# Patient Record
Sex: FEMALE | Race: WHITE | Employment: STUDENT | URBAN - METROPOLITAN AREA
[De-identification: names, ages, dates, MRNs, and addresses within clinical notes are randomized per-mention and may not be internally consistent; named-entity substitution may affect disease eponyms.]

---

## 2020-11-18 ENCOUNTER — TELEPHONE (OUTPATIENT)
Dept: PRIMARY CARE CLINIC | Age: 20
End: 2020-11-18

## 2020-11-18 NOTE — TELEPHONE ENCOUNTER
Patient called and stated she would like a Covid-19 referral sent to [address] [place] [address] her she can go to the flu clinic but she said someone specific wanted her to go to [facility].

## 2020-11-19 NOTE — TELEPHONE ENCOUNTER
Patient called back and stated she really would like to get a test done as she has multiple symptoms and she is still working as she does not have a test yet.     Fax to [phone]

## 2022-03-23 ENCOUNTER — TELEPHONE (OUTPATIENT)
Dept: PRIMARY CARE CLINIC | Age: 22
End: 2022-03-23

## 2022-03-23 NOTE — TELEPHONE ENCOUNTER
Pt. Was accidentally transferred to [facility] and writer contacted pt to communicate w/ pt. Pt. Stated she apologized she was trying to get in touch w/ her previous PCP Dr. Paul Gordon.  Writer located number for pt to reach out to follow up w/ lab order request.

## 2022-03-23 NOTE — TELEPHONE ENCOUNTER
----- Message from Simone Schmid sent at 3/23/2022 12:47 PM EDT -----  Subject: Message to Provider    QUESTIONS  Information for Provider? Was once a patient of the practice and has bot   be seen i awhile but needs titers doesn't for school to see what is has   immunity to and doesn't   ---------------------------------------------------------------------------  --------------  CALL BACK INFO  What is the best way for the office to contact you? OK to leave message on   voicemail  Preferred Call Back Phone Number? [phone]  ---------------------------------------------------------------------------  --------------  SCRIPT ANSWERS  Relationship to Patient?  Self

## 2022-03-23 NOTE — TELEPHONE ENCOUNTER
----- Message from Ross Lopez sent at 3/23/2022  1:31 PM EDT -----  Subject: Message to Provider    QUESTIONS  Information for Provider? 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   ---------------------------------------------------------------------------  --------------  [address]  What is the best way for the office to contact you? OK to leave message on   voicemail  Preferred Call Back Phone Number? [phone]  ---------------------------------------------------------------------------  --------------  SCRIPT ANSWERS  Relationship to Patient?  Self

## 2022-04-07 ENCOUNTER — OFFICE VISIT (OUTPATIENT)
Dept: FAMILY MEDICINE CLINIC | Age: 22
End: 2022-04-07
Payer: COMMERCIAL

## 2022-04-07 ENCOUNTER — HOSPITAL ENCOUNTER (OUTPATIENT)
Age: 22
Setting detail: SPECIMEN
Discharge: HOME | End: 2022-04-07

## 2022-04-07 VITALS
WEIGHT: 171.4 LBS | HEIGHT: 64 IN | DIASTOLIC BLOOD PRESSURE: 76 MMHG | SYSTOLIC BLOOD PRESSURE: 118 MMHG | BODY MASS INDEX: 29.26 KG/M2

## 2022-04-07 DIAGNOSIS — Z76.89 ENCOUNTER TO ESTABLISH CARE: ICD-10-CM

## 2022-04-07 DIAGNOSIS — F39 MOOD DISORDER (HCC): ICD-10-CM

## 2022-04-07 DIAGNOSIS — Z02.0 SCHOOL PHYSICAL EXAM: ICD-10-CM

## 2022-04-07 DIAGNOSIS — L40.0 PLAQUE PSORIASIS: ICD-10-CM

## 2022-04-07 DIAGNOSIS — N92.1 MENORRHAGIA WITH IRREGULAR CYCLE: ICD-10-CM

## 2022-04-07 DIAGNOSIS — F41.9 ANXIETY: ICD-10-CM

## 2022-04-07 DIAGNOSIS — Z02.0 SCHOOL PHYSICAL EXAM: Primary | ICD-10-CM

## 2022-04-07 DIAGNOSIS — F32.A DEPRESSION, UNSPECIFIED DEPRESSION TYPE: ICD-10-CM

## 2022-04-07 LAB — RUBV IGG SER QL: 127 IU/ML

## 2022-04-07 PROCEDURE — G0444 DEPRESSION SCREEN ANNUAL: HCPCS

## 2022-04-07 PROCEDURE — 99385 PREV VISIT NEW AGE 18-39: CPT

## 2022-04-07 PROCEDURE — 99214 OFFICE O/P EST MOD 30 MIN: CPT

## 2022-04-07 RX ORDER — GUSELKUMAB 100 MG/ML
INJECTION SUBCUTANEOUS
COMMUNITY
Start: 2022-01-24

## 2022-04-07 SDOH — HEALTH STABILITY: PHYSICAL HEALTH: ON AVERAGE, HOW MANY DAYS PER WEEK DO YOU ENGAGE IN MODERATE TO STRENUOUS EXERCISE (LIKE A BRISK WALK)?: 1 DAY

## 2022-04-07 SDOH — HEALTH STABILITY: PHYSICAL HEALTH: ON AVERAGE, HOW MANY MINUTES DO YOU ENGAGE IN EXERCISE AT THIS LEVEL?: 60 MIN

## 2022-04-07 ASSESSMENT — PATIENT HEALTH QUESTIONNAIRE - PHQ9
SUM OF ALL RESPONSES TO PHQ QUESTIONS 1-9: 18
7. TROUBLE CONCENTRATING ON THINGS, SUCH AS READING THE NEWSPAPER OR WATCHING TELEVISION: 1
9. THOUGHTS THAT YOU WOULD BE BETTER OFF DEAD, OR OF HURTING YOURSELF: 2
2. FEELING DOWN, DEPRESSED OR HOPELESS: 3
8. MOVING OR SPEAKING SO SLOWLY THAT OTHER PEOPLE COULD HAVE NOTICED. OR THE OPPOSITE, BEING SO FIGETY OR RESTLESS THAT YOU HAVE BEEN MOVING AROUND A LOT MORE THAN USUAL: 2
4. FEELING TIRED OR HAVING LITTLE ENERGY: 3
1. LITTLE INTEREST OR PLEASURE IN DOING THINGS: 2
10. IF YOU CHECKED OFF ANY PROBLEMS, HOW DIFFICULT HAVE THESE PROBLEMS MADE IT FOR YOU TO DO YOUR WORK, TAKE CARE OF THINGS AT HOME, OR GET ALONG WITH OTHER PEOPLE: 2
3. TROUBLE FALLING OR STAYING ASLEEP: 2
5. POOR APPETITE OR OVEREATING: 3
SUM OF ALL RESPONSES TO PHQ QUESTIONS 1-9: 20
SUM OF ALL RESPONSES TO PHQ9 QUESTIONS 1 & 2: 5
SUM OF ALL RESPONSES TO PHQ QUESTIONS 1-9: 20
SUM OF ALL RESPONSES TO PHQ QUESTIONS 1-9: 20
6. FEELING BAD ABOUT YOURSELF - OR THAT YOU ARE A FAILURE OR HAVE LET YOURSELF OR YOUR FAMILY DOWN: 2

## 2022-04-07 ASSESSMENT — ANXIETY QUESTIONNAIRES
2. NOT BEING ABLE TO STOP OR CONTROL WORRYING: 3
6. BECOMING EASILY ANNOYED OR IRRITABLE: 3
IF YOU CHECKED OFF ANY PROBLEMS ON THIS QUESTIONNAIRE, HOW DIFFICULT HAVE THESE PROBLEMS MADE IT FOR YOU TO DO YOUR WORK, TAKE CARE OF THINGS AT HOME, OR GET ALONG WITH OTHER PEOPLE: VERY DIFFICULT
5. BEING SO RESTLESS THAT IT IS HARD TO SIT STILL: 2
3. WORRYING TOO MUCH ABOUT DIFFERENT THINGS: 3
4. TROUBLE RELAXING: 2
1. FEELING NERVOUS, ANXIOUS, OR ON EDGE: 3
GAD7 TOTAL SCORE: 17
7. FEELING AFRAID AS IF SOMETHING AWFUL MIGHT HAPPEN: 1

## 2022-04-07 ASSESSMENT — COLUMBIA-SUICIDE SEVERITY RATING SCALE - C-SSRS
1. WITHIN THE PAST MONTH, HAVE YOU WISHED YOU WERE DEAD OR WISHED YOU COULD GO TO SLEEP AND NOT WAKE UP?: NO
2. HAVE YOU ACTUALLY HAD ANY THOUGHTS OF KILLING YOURSELF?: NO
6. HAVE YOU EVER DONE ANYTHING, STARTED TO DO ANYTHING, OR PREPARED TO DO ANYTHING TO END YOUR LIFE?: NO

## 2022-04-07 NOTE — PROGRESS NOTES
History and Physical      Flavio Baldwin  YOB: 2000    Date of Service:  4/7/2022    Chief Complaint:   Flavio Baldwin is a 25 y.o. Female who presents for complete physical examination. HPI: Patient here to establish care and complete physical and blood work for school. [facility] annually for her plaque psoriasis. She does Tremfya injections every. She did have routine labs drawn today from [facility]. Abnormal bleeding: Has previously been worked up for [employer] concern and had a trans vag US approx 2 years ago and [employer]. Had a Pap at that same time, which was normal.  She does have a long family history of endometreosis and her sister had a ovary removed due to a suspicious cyst.  Patient states she never had results of her US and she continues to bleed approx 75% of the month. Mental health concern: Upon further evaluation of screening tools today, patient states she has had significant mental health difficulties for several years. She has found this very difficult, as her family and parents \"do not believe in mental health issues\". Patient has found this extremely difficult throughout her life. Patient does also state that her parents have sporadically called her \"bipolar\". Richar Oakley feels that she will make poor life decisions based off of her unmanaged, undiagnosedv mental health concerns. She states one of the hardest problems, is going to see a therapist or psychiatrist due to insurance issues. Patient feels that she self medicates with alcohol, marijuana, and other unprescribed medications. When asking what medication she will take from others, she states \"I do not know what they are, because I do not care about the outcome. \"  Currently patient denies any thoughts of suicide or thoughts to harm herself. She does recognize that she needs help and would like to start medication.   I did discuss with her that I am happy to start her on medication, get her enrolled in therapy, and may also play send her to a psychiatrist for further diagnosis if needed. Vitals:    04/07/22 1425   BP: 118/76   Site: Right Upper Arm   Position: Sitting   Cuff Size: Large Adult   Weight: 171 lb 6.4 oz (77.7 kg)   Height: 5' 4\" (1.626 m)      Wt Readings from Last 3 Encounters:   04/07/22 171 lb 6.4 oz (77.7 kg)     BP Readings from Last 3 Encounters:   04/07/22 118/76       There is no problem list on file for this patient. Preventive Care:  Health Maintenance   Topic Date Due    Hepatitis C screen  Never done    Varicella vaccine (1 of 2 - 2-dose childhood series) Never done    COVID-19 Vaccine (1) Never done    HPV vaccine (1 - 2-dose series) Never done    HIV screen  Never done    Chlamydia screen  Never done    DTaP/Tdap/Td vaccine (1 - Tdap) Never done    Pap smear  Never done    Flu vaccine (Season Ended) 09/01/2022    Depression Monitoring  04/07/2023    Meningococcal (ACWY) vaccine  Completed    Hepatitis A vaccine  Aged Out    Hepatitis B vaccine  Aged Out    Hib vaccine  Aged Out    Pneumococcal 0-64 years Vaccine  Aged Out      Hx abnormal PAP: No abnormal pap  Sexual activity: single partner, contraception - Nexplanon   Self-breast exams: yes  Last eye exam: Contacts, abnormal - has another appointment coming up  Exercise:  Works out once or twice a week  Dental Exam: No insurance, told about [facility]  Lipid panel: No results found for: CHOL, TRIG, HDL, LDLCALC, LDLDIRECT       Immunization History   Administered Date(s) Administered    Influenza Virus Vaccine 12/03/2009, 01/09/2010, 01/08/2013, 01/16/2014, 12/22/2014, 09/30/2016    Meningococcal MCV4O (Menveo) 10/11/2016       Allergies   Allergen Reactions    Amoxicillin     Bactrim [Sulfamethoxazole-Trimethoprim]     Otezla [Apremilast]     Penicillins      Outpatient Medications Marked as Taking for the 4/7/22 encounter (Office Visit) with Carline Pelaez APRN - CNP   Medication Sig Dispense Refill    Phenylephrine-guaiFENesin (NEXPHEN PD PO) Take by mouth      TREMFYA 100 MG/ML SOSY injection       sertraline (ZOLOFT) 50 MG tablet Take 1 tablet by mouth daily 30 tablet 1       Past Medical History:   Diagnosis Date    Psoriasis      History reviewed. No pertinent surgical history. History reviewed. No pertinent family history. Review of Systems   Constitutional: Negative for activity change, fatigue, fever and unexpected weight change. HENT: Negative for dental problem and sore throat. Calcium growths on gum line- used to see dentist- no coverage at this time     Eyes: Negative for discharge and visual disturbance. Respiratory: Negative for cough and shortness of breath. Cardiovascular: Negative for chest pain, palpitations and leg swelling. Gastrointestinal: Negative for constipation, diarrhea, nausea and vomiting. Genitourinary: Negative for difficulty urinating and dysuria. Musculoskeletal: Negative for arthralgias and myalgias. Skin: Negative for rash and wound. Allergic/Immunologic: Negative for environmental allergies. Neurological: Negative for headaches. Hematological: Does not bruise/bleed easily. Psychiatric/Behavioral: Positive for agitation, behavioral problems and dysphoric mood. Negative for sleep disturbance. The patient is nervous/anxious. Physical Exam  Vitals reviewed. Constitutional:       General: She is not in acute distress. Appearance: Normal appearance. She is not ill-appearing or toxic-appearing. HENT:      Right Ear: Tympanic membrane, ear canal and external ear normal.      Left Ear: Tympanic membrane, ear canal and external ear normal.      Nose: Nose normal.      Mouth/Throat:      Mouth: Mucous membranes are moist.      Pharynx: Oropharynx is clear. Eyes:      Conjunctiva/sclera: Conjunctivae normal.   Cardiovascular:      Rate and Rhythm: Normal rate and regular rhythm. Pulses:           Radial pulses are 2+ on the right side and 2+ on the left side. Dorsalis pedis pulses are 2+ on the right side and 2+ on the left side. Heart sounds: Normal heart sounds. Pulmonary:      Effort: Pulmonary effort is normal.      Breath sounds: Normal breath sounds. Abdominal:      General: Abdomen is flat. Bowel sounds are normal.      Palpations: Abdomen is soft. Tenderness: There is no abdominal tenderness. Musculoskeletal:         General: No swelling or tenderness. Lymphadenopathy:      Cervical: No cervical adenopathy. Skin:     General: Skin is warm and dry. Capillary Refill: Capillary refill takes less than 2 seconds. Neurological:      Mental Status: She is alert and oriented to person, place, and time. Motor: No weakness. Psychiatric:         Mood and Affect: Mood is anxious. Affect is tearful. Speech: Speech normal.         Behavior: Behavior normal. Behavior is cooperative. Thought Content: Thought content normal. Thought content does not include homicidal or suicidal ideation. Judgment: Judgment is impulsive and inappropriate. No data recorded   SHARONDA 7 SCORE 4/7/2022   SHARONDA-7 Total Score 17     Interpretation of SHARONDA-7 score:   5-9 = mild anxiety  0-14 = moderate anxiety  15+ = severe anxiety. Recommend referral to behavioral health for scores 10 or greater. PHQ Scores 4/7/2022   PHQ2 Score 5   PHQ9 Score 20     Interpretation of Total Score Depression Severity: 1-4 = Minimal depression, 5-9 = Mild depression, 10-14 = Moderate depression, 15-19 = Moderately severe depression, 20-27 = Severe depression      Assessment/Plan:    1. School physical exam  -     Hepatitis B Surface Antibody; Future  -     Mumps Antibody, IgG; Future  -     Rubella antibody, IgG; Future  -     Rubeola Antibody, IgG; Future  -     Varicella Zoster Antibody, IgG; Future  2. Encounter to establish care  3.  Anxiety  -     sertraline (ZOLOFT) 50 MG tablet; Take 1 tablet by mouth daily, Disp-30 tablet, R-1Normal  -     Ambulatory referral to [address]  4. Depression, unspecified depression type  -     sertraline (ZOLOFT) 50 MG tablet; Take 1 tablet by mouth daily, Disp-30 tablet, R-1Normal  -     Ambulatory referral to [address]  5. Menorrhagia with irregular cycle  -     [facility] and Gynecology  6. Mood disorder (HCC)  -     sertraline (ZOLOFT) 50 MG tablet; Take 1 tablet by mouth daily, Disp-30 tablet, R-1Normal  7. Plaque psoriasis    Patient to start therapy. Start Zoloft  Increase physical activity  Complete titers and given immunization record. Patient advised not to use any non prescribed medications not written for her. Aware of risks. Patient denies current thoughts to harmself, and has used rescue phone lines in the past.   Patient to return in 4 weeks or sooner as needed. Continue routine care and management with dermatology.

## 2022-04-08 ENCOUNTER — TELEPHONE (OUTPATIENT)
Dept: FAMILY MEDICINE CLINIC | Age: 22
End: 2022-04-08

## 2022-04-08 DIAGNOSIS — F32.A DEPRESSION, UNSPECIFIED DEPRESSION TYPE: Primary | ICD-10-CM

## 2022-04-08 DIAGNOSIS — F41.9 ANXIETY: ICD-10-CM

## 2022-04-08 LAB
HBV SURFACE AB TITR SER: 10.08 MIU/ML
MEASLES ANTIBODY IGG: 2.26
MUV IGG SER QL: 1.77
VZV IGG SER QL IA: 1.8

## 2022-04-08 RX ORDER — VENLAFAXINE HYDROCHLORIDE 37.5 MG/1
37.5 CAPSULE, EXTENDED RELEASE ORAL DAILY
Qty: 64 CAPSULE | Refills: 3 | Status: SHIPPED
Start: 2022-04-08 | End: 2022-05-05

## 2022-04-08 NOTE — TELEPHONE ENCOUNTER
Uncontrollable vomiting and diarrhea. Please advise.    ----- Message from Tee Gerardo sent at 4/8/2022  2:52 PM EDT -----  Subject: Message to Provider    QUESTIONS  Information for Provider? Side effects to zofolt   ---------------------------------------------------------------------------  --------------  CALL BACK INFO  What is the best way for the office to contact you? OK to leave message on   voicemail  Preferred Call Back Phone Number? [phone]  ---------------------------------------------------------------------------  --------------  SCRIPT ANSWERS  Relationship to Patient?  Self

## 2022-04-08 NOTE — TELEPHONE ENCOUNTER
Please advise patient to stop Zoloft. We will try Effexor instead. I will start on the very lowest dose of this. Like her to start taking 1 By mouth daily for 4 days, then increase to 2 capsules daily thereafter. Please advise that she does take this medication with food.

## 2022-04-17 PROBLEM — N92.1 MENORRHAGIA WITH IRREGULAR CYCLE: Status: ACTIVE | Noted: 2022-04-17

## 2022-04-17 PROBLEM — L40.0 PLAQUE PSORIASIS: Status: ACTIVE | Noted: 2022-04-17

## 2022-04-17 PROBLEM — F32.A DEPRESSION: Status: ACTIVE | Noted: 2022-04-17

## 2022-04-17 PROBLEM — F41.9 ANXIETY: Status: ACTIVE | Noted: 2022-04-17

## 2022-04-17 ASSESSMENT — ENCOUNTER SYMPTOMS
EYE DISCHARGE: 0
SHORTNESS OF BREATH: 0
DIARRHEA: 0
SORE THROAT: 0
CONSTIPATION: 0
NAUSEA: 0
VOMITING: 0
COUGH: 0

## 2022-05-05 ENCOUNTER — OFFICE VISIT (OUTPATIENT)
Dept: FAMILY MEDICINE CLINIC | Age: 22
End: 2022-05-05
Payer: COMMERCIAL

## 2022-05-05 VITALS
SYSTOLIC BLOOD PRESSURE: 118 MMHG | DIASTOLIC BLOOD PRESSURE: 80 MMHG | BODY MASS INDEX: 29.02 KG/M2 | WEIGHT: 170 LBS | HEIGHT: 64 IN

## 2022-05-05 DIAGNOSIS — F32.A DEPRESSION, UNSPECIFIED DEPRESSION TYPE: ICD-10-CM

## 2022-05-05 DIAGNOSIS — F41.9 ANXIETY: ICD-10-CM

## 2022-05-05 DIAGNOSIS — F39 MOOD DISORDER (HCC): ICD-10-CM

## 2022-05-05 DIAGNOSIS — F33.9 CHRONIC MAJOR DEPRESSIVE DISORDER, RECURRENT EPISODE (HCC): Primary | ICD-10-CM

## 2022-05-05 PROCEDURE — 1036F TOBACCO NON-USER: CPT

## 2022-05-05 PROCEDURE — 99215 OFFICE O/P EST HI 40 MIN: CPT

## 2022-05-05 PROCEDURE — G8427 DOCREV CUR MEDS BY ELIG CLIN: HCPCS

## 2022-05-05 PROCEDURE — G8419 CALC BMI OUT NRM PARAM NOF/U: HCPCS

## 2022-05-05 ASSESSMENT — ENCOUNTER SYMPTOMS
EYE DISCHARGE: 0
COUGH: 0
VOMITING: 0
SHORTNESS OF BREATH: 0
SORE THROAT: 0
CONSTIPATION: 0
NAUSEA: 0
DIARRHEA: 0

## 2022-05-05 NOTE — Clinical Note
You are seeing this sweet girl next week. She is so kind, but really battling very dark depression. This was first ever really acknowledged about a month ago. I really think she fits clinical guidelines for bipolar, but would love any input. She also needs clearance to work in healthcare. I dont feel she deems any risk to others, but she has stated before she has thought about hurting other people. She states she knows she puts on a \"good front\".

## 2022-05-05 NOTE — PROGRESS NOTES
Ricyh Mancuso (:  2000) is a 25 y.o. female,Established patient, here for evaluation of the following chief complaint(s):  Depression and Anxiety         ASSESSMENT/PLAN:  1. Chronic major depressive disorder, recurrent episode (HCC)  -     cariprazine hcl (VRAYLAR) 1.5 MG capsule; Take 1 capsule by mouth daily for 14 days, Disp-14 capsule, R-0Sample  2. Mood disorder (HCC)  -     cariprazine hcl (VRAYLAR) 1.5 MG capsule; Take 1 capsule by mouth daily for 14 days, Disp-14 capsule, R-0Sample  3. Anxiety  -     cariprazine hcl (VRAYLAR) 1.5 MG capsule; Take 1 capsule by mouth daily for 14 days, Disp-14 capsule, R-0Sample  4. Depression, unspecified depression type  -     cariprazine hcl (VRAYLAR) 1.5 MG capsule; Take 1 capsule by mouth daily for 14 days, Disp-14 capsule, R-0Sample    At this time I do not feel that patient is a risk to self or others. She is very proactive, and seeks medical treatment. She is also starting therapy next week for further resources. Patient verbalizes that if she did have a plan to commit suicide, she would reach out to the hotline or seek medical treatment. Patient has had 2 medication failures for anxiety and depression, and at this time I do feel she could benefit from more of a mood stabilizer, such as Vraylar. Patient is open to starting this. She is also open to seeing psychiatry if we do not have good improvement. Patient to see therapist next week and 2 week follow up with me. Return in about 4 days (around 2022) for TDap  and COVID vaccine. All start TB 2step . Subjective   SUBJECTIVE/OBJECTIVE:    Patient presents today for routine follow-up after starting Zoloft. After her first dose of Zoloft had significant vomiting, and was quickly changed to Effexor. Patient states she noticed no difference, and actually has been battling more depressive episodes over the last month.   She does continue to acknowledge that she is having difficulty with depression and suicidal thoughts. She also recently did superficially cut herself. Continues to feel like she has significant bouts of depression for weeks at a time, and then will have a couple weeks where she is very elated and happy. She continues to feel very agitated and easily angered. Patient did previously score high on the mood disorder screening tool. 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 Patient does not feel she is a risk to her patient's, as she states she truly enjoys her job. Patient does verbalize that she would seek medical treatment in the event that she felt she would harm herself or harm others. Review of Systems   Constitutional: Negative for activity change, fatigue, fever and unexpected weight change. HENT: Negative for dental problem and sore throat. Eyes: Negative for discharge and visual disturbance. Respiratory: Negative for cough and shortness of breath. Cardiovascular: Negative for chest pain, palpitations and leg swelling. Gastrointestinal: Negative for constipation, diarrhea, nausea and vomiting. Genitourinary: Negative for difficulty urinating and dysuria. Musculoskeletal: Negative for arthralgias and myalgias. Skin: Negative for rash and wound. Allergic/Immunologic: Negative for environmental allergies. Neurological: Negative for headaches. Hematological: Does not bruise/bleed easily. Psychiatric/Behavioral: Positive for agitation, behavioral problems, dysphoric mood, self-injury and suicidal ideas. Negative for sleep disturbance. The patient is nervous/anxious. Objective   Physical Exam  Constitutional:       General: She is not in acute distress. Appearance: Normal appearance. She is not ill-appearing. Cardiovascular:      Rate and Rhythm: Normal rate and regular rhythm. Heart sounds: Normal heart sounds. No murmur heard.       Pulmonary:      Effort: Pulmonary effort is normal.      Breath sounds: Normal breath sounds. Skin:     General: Skin is warm and dry. Comments: Scant superficial cut marks on left wrist, healing well. No redness or drainage. Neurological:      Mental Status: She is alert and oriented to person, place, and time. Motor: No weakness. Gait: Gait normal.   Psychiatric:         Attention and Perception: Attention normal.         Mood and Affect: Mood is depressed. Affect is tearful. Speech: Speech normal.         Behavior: Behavior normal.         Thought Content: Thought content does not include homicidal or suicidal ( thoughts) ideation. Thought content does not include homicidal or suicidal plan. Judgment: Judgment normal.      Comments: Intermittently tearful. Feels that the last 2 weeks have been really hard and she is having deep, dark, thouhts. She denies any plan to hurt self, and states her most recent self inflicted cuts were to feel the pain. She feels more anxious because of how bad her depression currently is. She notes that she has these severe lows and increased highs that seem to wax and wane. She has previously has thoughts to hurt other people, but only when she is arguing with them. She has a difficulty time with anger. On this date 5/5/2022 I have spent 40 minutes reviewing previous notes, test results and face to face with the patient discussing the diagnosis and importance of compliance with the treatment plan as well as documenting on the day of the visit. An electronic signature was used to authenticate this note.     --BRIANA Cowart - CNP

## 2022-05-09 ENCOUNTER — OFFICE VISIT (OUTPATIENT)
Dept: BEHAVIORAL/MENTAL HEALTH CLINIC | Age: 22
End: 2022-05-09
Payer: COMMERCIAL

## 2022-05-09 ENCOUNTER — NURSE ONLY (OUTPATIENT)
Dept: FAMILY MEDICINE CLINIC | Age: 22
End: 2022-05-09
Payer: COMMERCIAL

## 2022-05-09 VITALS — BODY MASS INDEX: 29.02 KG/M2 | HEIGHT: 64 IN | WEIGHT: 170 LBS

## 2022-05-09 DIAGNOSIS — F43.10 PTSD (POST-TRAUMATIC STRESS DISORDER): Primary | ICD-10-CM

## 2022-05-09 DIAGNOSIS — Z23 NEED FOR TUBERCULOSIS VACCINATION: Primary | ICD-10-CM

## 2022-05-09 PROCEDURE — 90715 TDAP VACCINE 7 YRS/> IM: CPT | Performed by: NURSE PRACTITIONER

## 2022-05-09 PROCEDURE — 90471 IMMUNIZATION ADMIN: CPT | Performed by: NURSE PRACTITIONER

## 2022-05-09 PROCEDURE — 1036F TOBACCO NON-USER: CPT | Performed by: SOCIAL WORKER

## 2022-05-09 PROCEDURE — 90791 PSYCH DIAGNOSTIC EVALUATION: CPT | Performed by: SOCIAL WORKER

## 2022-05-09 NOTE — PROGRESS NOTES
Patient tolerated TB injection well in the right forearm palm up. Also tolerated the tdap injection well in the left deltoid. No concerns or irritation at site. Patient was advised to wait 10 min after immunization incase of a reaction. Office does no have COVID vaccine right now, it is . I told patient that it was not clear if office is going to continue the vaccine. She stated she will go to a local pharmacy, [facility] today. Writer advised patient to make it known she got these vaccines today.
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
## 2022-05-11 ENCOUNTER — NURSE ONLY (OUTPATIENT)
Dept: FAMILY MEDICINE CLINIC | Age: 22
End: 2022-05-11

## 2022-05-11 VITALS — BODY MASS INDEX: 29.02 KG/M2 | WEIGHT: 170 LBS | HEIGHT: 64 IN | TEMPERATURE: 97.7 F

## 2022-05-11 DIAGNOSIS — Z11.1 ENCOUNTER FOR PPD SKIN TEST READING: Primary | ICD-10-CM

## 2022-05-11 NOTE — PROGRESS NOTES
PPD Reading Note  PPD read and results entered in [facility]. Result: 0.0 mm induration.   Interpretation: Negative  If test not read within 48-72 hours of initial placement, patient advised to repeat in other arm 1-3 weeks after this test.  Allergic reaction: no

## 2022-05-18 ENCOUNTER — NURSE ONLY (OUTPATIENT)
Dept: FAMILY MEDICINE CLINIC | Age: 22
End: 2022-05-18

## 2022-05-18 DIAGNOSIS — Z23 NEED FOR TUBERCULOSIS VACCINATION: Primary | ICD-10-CM

## 2022-05-18 NOTE — PROGRESS NOTES
Patient tolerated TB injection very well in the left arm palm up. No concerns or irritation at site. Patient scheduled for her next and final read.

## 2022-05-19 ENCOUNTER — OFFICE VISIT (OUTPATIENT)
Dept: FAMILY MEDICINE CLINIC | Age: 22
End: 2022-05-19
Payer: COMMERCIAL

## 2022-05-19 VITALS — DIASTOLIC BLOOD PRESSURE: 80 MMHG | HEIGHT: 64 IN | BODY MASS INDEX: 29.18 KG/M2 | SYSTOLIC BLOOD PRESSURE: 126 MMHG

## 2022-05-19 DIAGNOSIS — F39 MOOD DISORDER (HCC): ICD-10-CM

## 2022-05-19 DIAGNOSIS — F41.9 ANXIETY: ICD-10-CM

## 2022-05-19 DIAGNOSIS — F33.9 CHRONIC MAJOR DEPRESSIVE DISORDER, RECURRENT EPISODE (HCC): Primary | ICD-10-CM

## 2022-05-19 DIAGNOSIS — F32.A DEPRESSION, UNSPECIFIED DEPRESSION TYPE: ICD-10-CM

## 2022-05-19 PROCEDURE — 99214 OFFICE O/P EST MOD 30 MIN: CPT

## 2022-05-19 PROCEDURE — G8419 CALC BMI OUT NRM PARAM NOF/U: HCPCS

## 2022-05-19 PROCEDURE — 1036F TOBACCO NON-USER: CPT

## 2022-05-19 PROCEDURE — G8427 DOCREV CUR MEDS BY ELIG CLIN: HCPCS

## 2022-05-19 SDOH — ECONOMIC STABILITY: FOOD INSECURITY: WITHIN THE PAST 12 MONTHS, YOU WORRIED THAT YOUR FOOD WOULD RUN OUT BEFORE YOU GOT MONEY TO BUY MORE.: NEVER TRUE

## 2022-05-19 SDOH — ECONOMIC STABILITY: FOOD INSECURITY: WITHIN THE PAST 12 MONTHS, THE FOOD YOU BOUGHT JUST DIDN'T LAST AND YOU DIDN'T HAVE MONEY TO GET MORE.: NEVER TRUE

## 2022-05-19 ASSESSMENT — SOCIAL DETERMINANTS OF HEALTH (SDOH): HOW HARD IS IT FOR YOU TO PAY FOR THE VERY BASICS LIKE FOOD, HOUSING, MEDICAL CARE, AND HEATING?: NOT HARD AT ALL

## 2022-05-19 NOTE — PROGRESS NOTES
Stef Agosto (:  2000) is a 25 y.o. female,Established patient, here for evaluation of the following chief complaint(s): Anxiety and Depression         ASSESSMENT/PLAN:  1. Chronic major depressive disorder, recurrent episode (HCC)  -     cariprazine hcl (VRAYLAR) 1.5 MG capsule; Take 1 capsule by mouth daily, Disp-30 capsule, R-5Normal  2. Mood disorder (HCC)  -     cariprazine hcl (VRAYLAR) 1.5 MG capsule; Take 1 capsule by mouth daily, Disp-30 capsule, R-5Normal  3. Anxiety  -     cariprazine hcl (VRAYLAR) 1.5 MG capsule; Take 1 capsule by mouth daily, Disp-30 capsule, R-5Normal  4. Depression, unspecified depression type  -     cariprazine hcl (VRAYLAR) 1.5 MG capsule; Take 1 capsule by mouth daily, Disp-30 capsule, R-5Normal    And continues therapy with Carmen in office. Continue to encourage cardiovascular activity and exercise. Continue Vraylar at 1.5 mg. Coupon card given in office. Return to office in 3 months or sooner as needed. Return in about 3 months (around 2022). Vraylar Samples- 2,7 packs given   LOT [id number]  Ex:     Subjective   SUBJECTIVE/OBJECTIVE:  Patient presents today after starting Vraylar samples 1.5 mg. She has also established with therapy, in regards to her anxiety, depression, and PTSD. Patient was having significant difficulty with severe highs and lows, and also previous thoughts of suicide. Patient mentions today, that this is the best she has felt in a really long time, and almost makes her feel uncomfortable, because she is not used to having such mental clarity, rationalize her thoughts, and not be so easily angered. Patient also states she has not had excessive highs or excessive lows since starting the Vraylar samples. Patient is hopeful that she can stay on this long-term, as she notices significant differences in her behavior and her dark thoughts. Today patient denies any thoughts to harm herself or thoughts to harm others.   She is eager to continue her therapy. 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 She just recently moved, and has no other concerns at today's visit. Review of Systems   Constitutional: Negative for activity change, fatigue and fever. HENT: Negative for dental problem and sore throat. Eyes: Negative for discharge and visual disturbance. Respiratory: Negative for cough and shortness of breath. Cardiovascular: Negative for chest pain, palpitations and leg swelling. Gastrointestinal: Negative for constipation, diarrhea, nausea and vomiting. Genitourinary: Negative for difficulty urinating and dysuria. Musculoskeletal: Negative for arthralgias and myalgias. Skin: Negative for rash and wound. Allergic/Immunologic: Negative for environmental allergies. Neurological: Negative for headaches. Hematological: Does not bruise/bleed easily. Psychiatric/Behavioral: Negative for agitation and sleep disturbance. Significant improvement in overall mood, depression and anxiety. Objective   Physical Exam  Constitutional:       General: She is not in acute distress. Appearance: Normal appearance. She is normal weight. She is not ill-appearing, toxic-appearing or diaphoretic. Cardiovascular:      Rate and Rhythm: Normal rate and regular rhythm. Heart sounds: Normal heart sounds. No murmur heard. Pulmonary:      Effort: Pulmonary effort is normal.      Breath sounds: Normal breath sounds. Skin:     General: Skin is warm and dry. Neurological:      Mental Status: She is alert and oriented to person, place, and time. Psychiatric:         Attention and Perception: Attention normal.         Mood and Affect: Mood and affect normal.         Speech: Speech normal. She is communicative. Speech is not rapid and pressured. Behavior: Behavior normal. Behavior is cooperative. Thought Content:  Thought content normal. Thought content does not include homicidal or suicidal ideation. Thought content does not include homicidal or suicidal plan. Judgment: Judgment normal.            On this date 5/19/2022 I have spent 35 minutes reviewing previous notes, test results and face to face with the patient discussing the diagnosis and importance of compliance with the treatment plan as well as documenting on the day of the visit. An electronic signature was used to authenticate this note.     --BRIANA Ortiz - CNP

## 2022-05-20 ENCOUNTER — NURSE ONLY (OUTPATIENT)
Dept: FAMILY MEDICINE CLINIC | Age: 22
End: 2022-05-20

## 2022-05-20 VITALS — HEART RATE: 76 BPM | BODY MASS INDEX: 29.18 KG/M2 | WEIGHT: 170 LBS | TEMPERATURE: 97.9 F

## 2022-05-20 DIAGNOSIS — Z11.1 ENCOUNTER FOR PPD SKIN TEST READING: Primary | ICD-10-CM

## 2022-05-20 NOTE — PROGRESS NOTES
PPD Reading Note  PPD read and results entered in [facility]. Result: 0 mm induration.   Interpretation: Negative  If test not read within 48-72 hours of initial placement, patient advised to repeat in other arm 1-3 weeks after this test.  Allergic reaction: no

## 2022-05-23 ENCOUNTER — TELEMEDICINE (OUTPATIENT)
Dept: BEHAVIORAL/MENTAL HEALTH CLINIC | Age: 22
End: 2022-05-23
Payer: COMMERCIAL

## 2022-05-23 DIAGNOSIS — F43.10 PTSD (POST-TRAUMATIC STRESS DISORDER): Primary | ICD-10-CM

## 2022-05-23 PROCEDURE — 90832 PSYTX W PT 30 MINUTES: CPT | Performed by: SOCIAL WORKER

## 2022-05-23 PROCEDURE — 1036F TOBACCO NON-USER: CPT | Performed by: SOCIAL WORKER

## 2022-05-23 NOTE — PROGRESS NOTES
ADULT BEHAVIORAL HEALTH FOLLOW UP  PRO Adams  Licensed Independent        Visit Date: 5/23/2022   Time of appointment:  228-258p   Time spent with Patient: 30 minutes. This is patient's second appointment. Reason for Consult:  Anxiety and Agitation     Referring Provider/PCP:    No ref. provider found  BRIANA Velasquez - CNP      Pt provided informed consent for the behavioral health program. Discussed with patient model of service to include the limits of confidentiality (i.e. abuse reporting, suicide intervention, etc.) and short-term intervention focused approach. Pt indicated understanding. Telehealth session conducted via [employer]. me, pt in privacy of home, in [place], clinician located in [place], [place]. Kwabena Vazquez is a 25 y.o. female who presents for follow up of anxiety and agitation. We discussed difficulties with anger, feels like she gets angry a lot and over minor things. We processed this further, finding she tends to stifle her emotions and invalidate her perspective, leading to more outbursts occurring. We processed where anger comes from, ways to listen and question it, in order to understand her triggers and emotions. She reports trying to walk away more in the moment, we reviewed need to add ways to release her anger in order to avoid a build up, as well as coming back to the problem that occurred in order to resolve instead of ignoring it. Previous Recommendations: Emilie Lyle is agreeable to engage in therapy to work on trauma responses. 1. She will track mood twice daily. 2. She will take medications as prescribed. 3. She will follow up with Heaven Almendarez in 2 weeks. MENTAL STATUS EXAM  Mood was anxious with anxious affect. Suicidal ideation was denied. Homicidal ideation was denied. Hygiene was good . Dress was appropriate. Behavior was Within Normal Limits with No observation or self-report of difficulties ambulating.    Attitude was Cooperative. Eye-contact was good. Speech: rate - WNL, rhythm - WNL, volume - WNL. Verbalizations were coherent. Thought processes were intact and goal-oriented without evidence of delusions, hallucinations, obsessions, or segundo; with moderate cognitive distortions. Associations were characterized by intact cognitive processes. Pt was oriented to person, place, time, and general circumstances;  recent:  good and remote:  good. Insight and judgment were estimated to be fair, AEB, a fair  understanding of cyclical maladaptive patterns, and the ability to use insight to inform behavior change. ASSESSMENT  Nanette Harley presented to the appointment today for evaluation and treatment of symptoms of anxiety and agitation. She is currently deemed no risk to herself or others and meets criteria for PTSD. She was in agreement with recommendations. PHQ Scores 4/7/2022   PHQ2 Score 5   PHQ9 Score 20     Interpretation of Total Score Depression Severity: 1-4 = Minimal depression, 5-9 = Mild depression, 10-14 = Moderate depression, 15-19 = Moderately severe depression, 20-27 = Severe depression    How often pt has had thoughts of death or hurting self (if PHQ positive for depression):       SHARONDA 7 SCORE 4/7/2022   SHARONDA-7 Total Score 17     Interpretation of SHARONDA-7 score: 5-9 = mild anxiety, 10-14 = moderate anxiety, 15+ = severe anxiety. Recommend referral to behavioral health for scores 10 or greater. DIAGNOSIS  Lilliam Leroy was seen today for anxiety and agitation.     Diagnoses and all orders for this visit:    PTSD (post-traumatic stress disorder)          INTERVENTION  Trained in strategies for increasing balanced thinking, Discussed self-care (sleep, nutrition, rewarding activities, social support, exercise), Discussed potential barriers to change, Established rapport, Supportive techniques and Provided Psychoeducation re: anger coping and release      Keeley Phillips will find ways to safely release her anger in order to avoid a build up. She will follow up with Asa Elizabeth in 2 weeks. INTERACTIVE COMPLEXITY  Is interactive complexity present?   No  Reason:  N/A  Additional Supporting Information:  N/A       Electronically signed by PRO José on 5/23/22 at 2:29 PM EDT

## 2022-05-25 PROBLEM — F33.9 CHRONIC MAJOR DEPRESSIVE DISORDER, RECURRENT EPISODE (HCC): Status: ACTIVE | Noted: 2022-05-25

## 2022-05-25 PROBLEM — F39 MOOD DISORDER (HCC): Status: ACTIVE | Noted: 2022-05-25

## 2022-05-25 ASSESSMENT — ENCOUNTER SYMPTOMS
COUGH: 0
EYE DISCHARGE: 0
NAUSEA: 0
SHORTNESS OF BREATH: 0
DIARRHEA: 0
CONSTIPATION: 0
SORE THROAT: 0
VOMITING: 0

## 2022-06-01 DIAGNOSIS — F33.9 CHRONIC MAJOR DEPRESSIVE DISORDER, RECURRENT EPISODE (HCC): ICD-10-CM

## 2022-06-01 DIAGNOSIS — F41.9 ANXIETY: ICD-10-CM

## 2022-06-01 DIAGNOSIS — F39 MOOD DISORDER (HCC): ICD-10-CM

## 2022-06-01 DIAGNOSIS — F32.A DEPRESSION, UNSPECIFIED DEPRESSION TYPE: ICD-10-CM

## 2022-06-09 ENCOUNTER — PATIENT MESSAGE (OUTPATIENT)
Dept: FAMILY MEDICINE CLINIC | Age: 22
End: 2022-06-09

## 2022-06-09 ENCOUNTER — TELEMEDICINE (OUTPATIENT)
Dept: BEHAVIORAL/MENTAL HEALTH CLINIC | Age: 22
End: 2022-06-09
Payer: COMMERCIAL

## 2022-06-09 DIAGNOSIS — F43.10 PTSD (POST-TRAUMATIC STRESS DISORDER): Primary | ICD-10-CM

## 2022-06-09 PROCEDURE — 1036F TOBACCO NON-USER: CPT | Performed by: SOCIAL WORKER

## 2022-06-09 PROCEDURE — 90834 PSYTX W PT 45 MINUTES: CPT | Performed by: SOCIAL WORKER

## 2022-06-09 NOTE — PROGRESS NOTES
ADULT BEHAVIORAL HEALTH FOLLOW UP  PRO Stewart  Licensed Independent        Visit Date: 6/9/2022   Time of appointment:  1030-1110a   Time spent with Patient: 40 minutes. This is patient's third appointment. Reason for Consult:  Agitation and Stress     Referring Provider/PCP:    No ref. provider found  BRIANA Goyal CNP      Pt provided informed consent for the behavioral health program. Discussed with patient model of service to include the limits of confidentiality (i.e. abuse reporting, suicide intervention, etc.) and short-term intervention focused approach. Pt indicated understanding. Telehealth session conducted via [employer]. me, pt in privacy of home, in [place], clinician located in [facility]. Anastasio Burkitt is a 25 y.o. female who presents for follow up of stress. We processed current life stressors, with focus on relational concerns. We reviewed communication options and boundaries, ways to reinforce behaviors she wants and not enforce those she does not. She was encouraged to consider what she needs to change and what she is willing to live with moving forward. Previous Recommendations: Bela Navarro will find ways to safely release her anger in order to avoid a build up. She will follow up with Andra Vazquez in 2 weeks. MENTAL STATUS EXAM  Mood was within normal limits with calm affect. Suicidal ideation was denied. Homicidal ideation was denied. Hygiene was good . Dress was appropriate. Behavior was Within Normal Limits with No observation or self-report of difficulties ambulating. Attitude was Cooperative. Eye-contact was good. Speech: rate - WNL, rhythm - WNL, volume - WNL. Verbalizations were coherent. Thought processes were intact and goal-oriented without evidence of delusions, hallucinations, obsessions, or segundo; with moderate cognitive distortions. Associations were characterized by intact cognitive processes.   Pt was oriented to person, place, time, and general circumstances;  recent:  good and remote:  good. Insight and judgment were estimated to be fair, AEB, a fair  understanding of cyclical maladaptive patterns, and the ability to use insight to inform behavior change. ASSESSMENT  Yenny Wetzel presented to the appointment today for evaluation and treatment of symptoms of stress. She is currently deemed no risk to herself or others and meets criteria for PTSD. She was in agreement with recommendations. PHQ Scores 4/7/2022   PHQ2 Score 5   PHQ9 Score 20     Interpretation of Total Score Depression Severity: 1-4 = Minimal depression, 5-9 = Mild depression, 10-14 = Moderate depression, 15-19 = Moderately severe depression, 20-27 = Severe depression    How often pt has had thoughts of death or hurting self (if PHQ positive for depression):       SHARONDA 7 SCORE 4/7/2022   SHARONDA-7 Total Score 17     Interpretation of SHARONDA-7 score: 5-9 = mild anxiety, 10-14 = moderate anxiety, 15+ = severe anxiety. Recommend referral to behavioral health for scores 10 or greater. DIAGNOSIS  Saul Howard was seen today for agitation and stress. Diagnoses and all orders for this visit:    PTSD (post-traumatic stress disorder)          INTERVENTION  Trained in strategies for increasing balanced thinking, Trained in improving communication skills, Discussed self-care (sleep, nutrition, rewarding activities, social support, exercise), Discussed potential barriers to change, Established rapport, Supportive techniques and Provided Psychoeducation re: boundaries      PLAN  Saul Howard will increase healthy communication and boundaries. She will follow up with [facility] in 2 weeks. INTERACTIVE COMPLEXITY  Is interactive complexity present?   No  Reason:  N/A  Additional Supporting Information:  N/A       Electronically signed by PRO Hewitt on 6/9/22 at 10:33 AM EDT

## 2022-06-10 NOTE — TELEPHONE ENCOUNTER
From: Maurilio Ambriz  To: Laila Victoria  Sent: 6/9/2022 1:17 PM EDT  Subject: Physical paperwork    Mahogany Montes! Am I able to bring in my physical sheet for completion? I dont think we finished filling it out just yet but my school needs it by the 12th.

## 2022-06-10 NOTE — TELEPHONE ENCOUNTER
OK to complete?  Physical done April 2022 with [facility]

## 2022-06-23 ENCOUNTER — TELEMEDICINE (OUTPATIENT)
Dept: BEHAVIORAL/MENTAL HEALTH CLINIC | Age: 22
End: 2022-06-23
Payer: COMMERCIAL

## 2022-06-23 DIAGNOSIS — F43.10 PTSD (POST-TRAUMATIC STRESS DISORDER): Primary | ICD-10-CM

## 2022-06-23 PROCEDURE — 90832 PSYTX W PT 30 MINUTES: CPT | Performed by: SOCIAL WORKER

## 2022-06-23 PROCEDURE — 1036F TOBACCO NON-USER: CPT | Performed by: SOCIAL WORKER

## 2022-06-23 NOTE — PROGRESS NOTES
ADULT BEHAVIORAL HEALTH FOLLOW UP  PRO Hernandez  Licensed Independent        Visit Date: 6/23/2022   Time of appointment:  930-1000a   Time spent with Patient: 30 minutes. This is patient's fourth appointment. Reason for Consult:  Stress     Referring Provider/PCP:    No ref. provider found  BRIANA Blanc - CNP      Pt provided informed consent for the behavioral health program. Discussed with patient model of service to include the limits of confidentiality (i.e. abuse reporting, suicide intervention, etc.) and short-term intervention focused approach. Pt indicated understanding. Telehealth session conducted via [employer]. me, pt in privacy of home, in [place], clinician located in [place], [place]. Rahul Mckeon is a 25 y.o. female who presents for follow up of stress. She reviewed recent stress with work and relationship, feeling times of sadness. She states she is trying to sit with this and allow it, we processed wave skill to continue this and also see this as a sign of need for more self care. We processed her recent changes in communication with boyfriend, feels this is helpful but she is frustrated with his lack of change. We processed behavior change skills, ways to reinforce behaviors we want in others and give no reinforcement for those we dont in order to hopefully see a decrease in the future. She was encouraged to continue to not engage in unnecessary arguments for her own well being and to not reinforce this. Previous Recommendations: Enmapaolamanish Anu will increase healthy communication and boundaries. She will follow up with Aracelis Francisco in 2 weeks. MENTAL STATUS EXAM  Mood was within normal limits with calm affect. Suicidal ideation was denied. Homicidal ideation was denied. Hygiene was good . Dress was appropriate. Behavior was Within Normal Limits with No observation or self-report of difficulties ambulating. Attitude was Cooperative.   Eye-contact was good.  Speech: rate - WNL, rhythm - WNL, volume - WNL. Verbalizations were coherent. Thought processes were intact and goal-oriented without evidence of delusions, hallucinations, obsessions, or segundo; with moderate cognitive distortions. Associations were characterized by intact cognitive processes. Pt was oriented to person, place, time, and general circumstances;  recent:  good and remote:  good. Insight and judgment were estimated to be fair, AEB, a fair  understanding of cyclical maladaptive patterns, and the ability to use insight to inform behavior change. ASSESSMENT  Santos Talavera presented to the appointment today for evaluation and treatment of symptoms of stress. She is currently deemed no risk to herself or others and meets criteria for PTSD. She was in agreement with recommendations. PHQ Scores 4/7/2022   PHQ2 Score 5   PHQ9 Score 20     Interpretation of Total Score Depression Severity: 1-4 = Minimal depression, 5-9 = Mild depression, 10-14 = Moderate depression, 15-19 = Moderately severe depression, 20-27 = Severe depression    How often pt has had thoughts of death or hurting self (if PHQ positive for depression):       SHARONDA 7 SCORE 4/7/2022   SHARONDA-7 Total Score 17     Interpretation of SHARONDA-7 score: 5-9 = mild anxiety, 10-14 = moderate anxiety, 15+ = severe anxiety. Recommend referral to behavioral health for scores 10 or greater. DIAGNOSIS  Emilie Lyle was seen today for stress. Diagnoses and all orders for this visit:    PTSD (post-traumatic stress disorder)          INTERVENTION  Trained in strategies for increasing balanced thinking, Trained in improving communication skills, Discussed self-care (sleep, nutrition, rewarding activities, social support, exercise), Discussed potential barriers to change, Supportive techniques and Provided Psychoeducation re: daniel Díaz will allow and listen to her emotions.  She will follow up with Heaven Almendarez in 3 weeks.      INTERACTIVE COMPLEXITY  Is interactive complexity present?   No  Reason:  N/A  Additional Supporting Information:  N/A       Electronically signed by PRO Steinberg on 6/23/22 at 9:32 AM EDT

## 2022-07-07 ENCOUNTER — TELEPHONE (OUTPATIENT)
Dept: BEHAVIORAL/MENTAL HEALTH CLINIC | Age: 22
End: 2022-07-07

## 2022-07-15 ENCOUNTER — TELEMEDICINE (OUTPATIENT)
Dept: BEHAVIORAL/MENTAL HEALTH CLINIC | Age: 22
End: 2022-07-15
Payer: COMMERCIAL

## 2022-07-15 DIAGNOSIS — F43.10 PTSD (POST-TRAUMATIC STRESS DISORDER): Primary | ICD-10-CM

## 2022-07-15 PROCEDURE — 90832 PSYTX W PT 30 MINUTES: CPT | Performed by: SOCIAL WORKER

## 2022-07-15 NOTE — PROGRESS NOTES
ADULT BEHAVIORAL HEALTH FOLLOW UP  PRO Fleming  Licensed Independent        Visit Date: 7/15/2022   Time of appointment:  [id number]   Time spent with Patient: 30 minutes. This is patient's fifth appointment. Reason for Consult:  Depression and Stress     Referring Provider/PCP:    No ref. provider found  BRIANA Cyr - CNP      Pt provided informed consent for the behavioral health program. Discussed with patient model of service to include the limits of confidentiality (i.e. abuse reporting, suicide intervention, etc.) and short-term intervention focused approach. Pt indicated understanding. Telehealth session conducted via [employer]. me, pt in privacy of car, in [place], clinician located in [place], [place]. Brown Hernandez is a 25 y.o. female who presents for follow up of depression. She reports recent breakup with boyfriend, we processed what happened and her emotions associated. She reports decreased appetite, increased crying, normalized this and encouraged her to allow the emotions as they arise, with reminder they will improve with time. We reviewed habits of codependancy and ways to slowly decrease this, with encouragement to take time for herself and improve the relationship with herself. We also reviewed skill of opposite action of love to assist her thru this. Previous Recommendations: Elmer Galarza will allow and listen to her emotions. She will follow up with Bhavya Wright in 3 weeks. MENTAL STATUS EXAM  Mood was depressed with sad  and tearful affect. Suicidal ideation was denied. Homicidal ideation was denied. Hygiene was good . Dress was appropriate. Behavior was Within Normal Limits with No self-report of difficulties ambulating. Attitude was Cooperative. Eye-contact was good. Speech: rate - WNL, rhythm - WNL, volume - WNL. Verbalizations were coherent.   Thought processes were intact and goal-oriented without evidence of delusions, hallucinations, obsessions, or segundo; with moderate cognitive distortions. Associations were characterized by intact cognitive processes. Pt was oriented to person, place, time, and general circumstances;  recent:  good and remote:  good. Insight and judgment were estimated to be fair, AEB, a fair  understanding of cyclical maladaptive patterns, and the ability to use insight to inform behavior change. ASSESSMENT  Keeley Marshall presented to the appointment today for evaluation and treatment of symptoms of depression. She is currently deemed no risk to herself or others and meets criteria for PTSD. She was in agreement with recommendations. PHQ Scores 4/7/2022   PHQ2 Score 5   PHQ9 Score 20     Interpretation of Total Score Depression Severity: 1-4 = Minimal depression, 5-9 = Mild depression, 10-14 = Moderate depression, 15-19 = Moderately severe depression, 20-27 = Severe depression    How often pt has had thoughts of death or hurting self (if PHQ positive for depression):       SHARONDA 7 SCORE 4/7/2022   SHARONDA-7 Total Score 17     Interpretation of SHARONDA-7 score: 5-9 = mild anxiety, 10-14 = moderate anxiety, 15+ = severe anxiety. Recommend referral to behavioral health for scores 10 or greater. DIAGNOSIS  Laura Richmond was seen today for depression and stress. Diagnoses and all orders for this visit:    PTSD (post-traumatic stress disorder)        INTERVENTION  Trained in strategies for increasing balanced thinking, Discussed self-care (sleep, nutrition, rewarding activities, social support, exercise), Discussed potential barriers to change, Supportive techniques, and Provided Psychoeducation re: opposite action      Anita Messina will utilize healthy coping for emotions and stress. She will follow up with Carmen Miller in 2 weeks. INTERACTIVE COMPLEXITY  Is interactive complexity present?   No  Reason:  N/A  Additional Supporting Information:  N/A       Electronically signed by PRO Whitehead on 7/15/22 at 12:03 PM EDT

## 2022-07-28 ENCOUNTER — TELEPHONE (OUTPATIENT)
Dept: BEHAVIORAL/MENTAL HEALTH CLINIC | Age: 22
End: 2022-07-28

## 2022-07-28 NOTE — TELEPHONE ENCOUNTER
.Contacted patient today in regards to missed appointment. Left voicemail. Was call completed? If not, reason: Call was completed    Does patient want to continue services? If no, and the patient is a new patient, please close the referral. Also, if no, please document reason and route message to provider. yes    Anything the provider should be aware of? If yes, please route call.  No    Reason for Missed Appointment: Unknown

## 2022-08-16 ENCOUNTER — TELEMEDICINE (OUTPATIENT)
Dept: BEHAVIORAL/MENTAL HEALTH CLINIC | Age: 22
End: 2022-08-16
Payer: COMMERCIAL

## 2022-08-16 DIAGNOSIS — F90.0 ADHD (ATTENTION DEFICIT HYPERACTIVITY DISORDER), INATTENTIVE TYPE: Primary | ICD-10-CM

## 2022-08-16 PROCEDURE — 90834 PSYTX W PT 45 MINUTES: CPT | Performed by: SOCIAL WORKER

## 2022-08-16 PROCEDURE — 1036F TOBACCO NON-USER: CPT | Performed by: SOCIAL WORKER

## 2022-08-16 NOTE — PROGRESS NOTES
follow up with PCP for medication options more related to ADHD and depression/anxiety treatment due to current and history of symptoms. Previous Recommendations: Artie Finnegan will utilize healthy coping for emotions and stress. She will follow up with Eve Medley in 2 weeks. MENTAL STATUS EXAM  Mood was sad with sad  affect. Suicidal ideation was denied. Homicidal ideation was denied. Hygiene was good . Dress was appropriate. Behavior was Within Normal Limits with No observation or self-report of difficulties ambulating. Attitude was Cooperative. Eye-contact was good. Speech: rate - WNL, rhythm - WNL, volume - WNL. Verbalizations were coherent. Thought processes were intact and goal-oriented without evidence of delusions, hallucinations, obsessions, or segundo; with moderate cognitive distortions. Associations were characterized by intact cognitive processes. Pt was oriented to person, place, time, and general circumstances;  recent:  good and remote:  good. Insight and judgment were estimated to be fair, AEB, a fair  understanding of cyclical maladaptive patterns, and the ability to use insight to inform behavior change. ASSESSMENT  Flavio Mancera presented to the appointment today for evaluation and treatment of symptoms of stress. She is currently deemed no risk to herself or others and meets criteria for ADHD, inattentive type and PTSD. Artie Finnegan was in agreement with recommendations. PHQ Scores 4/7/2022   PHQ2 Score 5   PHQ9 Score 20     Interpretation of Total Score Depression Severity: 1-4 = Minimal depression, 5-9 = Mild depression, 10-14 = Moderate depression, 15-19 = Moderately severe depression, 20-27 = Severe depression    How often pt has had thoughts of death or hurting self (if PHQ positive for depression):       SHARONDA 7 SCORE 4/7/2022   SHARONDA-7 Total Score 17     Interpretation of SHARONDA-7 score: 5-9 = mild anxiety, 10-14 = moderate anxiety, 15+ = severe anxiety.  Recommend

## 2022-08-17 ENCOUNTER — OFFICE VISIT (OUTPATIENT)
Dept: FAMILY MEDICINE CLINIC | Age: 22
End: 2022-08-17
Payer: COMMERCIAL

## 2022-08-17 VITALS
BODY MASS INDEX: 28.17 KG/M2 | HEIGHT: 64 IN | SYSTOLIC BLOOD PRESSURE: 122 MMHG | HEART RATE: 97 BPM | OXYGEN SATURATION: 93 % | WEIGHT: 165 LBS | DIASTOLIC BLOOD PRESSURE: 78 MMHG

## 2022-08-17 DIAGNOSIS — F32.A DEPRESSION, UNSPECIFIED DEPRESSION TYPE: ICD-10-CM

## 2022-08-17 DIAGNOSIS — F41.9 ANXIETY: ICD-10-CM

## 2022-08-17 DIAGNOSIS — F90.2 ATTENTION DEFICIT HYPERACTIVITY DISORDER (ADHD), COMBINED TYPE: Primary | ICD-10-CM

## 2022-08-17 PROCEDURE — 1036F TOBACCO NON-USER: CPT

## 2022-08-17 PROCEDURE — G8427 DOCREV CUR MEDS BY ELIG CLIN: HCPCS

## 2022-08-17 PROCEDURE — G8419 CALC BMI OUT NRM PARAM NOF/U: HCPCS

## 2022-08-17 PROCEDURE — 99214 OFFICE O/P EST MOD 30 MIN: CPT

## 2022-08-17 RX ORDER — BUPROPION HYDROCHLORIDE 150 MG/1
150 TABLET ORAL EVERY MORNING
Qty: 90 TABLET | Refills: 0 | Status: SHIPPED | OUTPATIENT
Start: 2022-08-17 | End: 2022-11-15

## 2022-08-17 NOTE — PROGRESS NOTES
denies any current thoughts to harm herself or thoughts of suicide. She has recently gone through a break-up, and is finding this difficult, but feels that she has good support system. Review of Systems   Constitutional:  Negative for activity change, fatigue and fever. HENT:  Negative for dental problem and sore throat. Eyes:  Negative for discharge and visual disturbance. Respiratory:  Negative for cough and shortness of breath. Cardiovascular:  Negative for chest pain, palpitations and leg swelling. Gastrointestinal:  Negative for constipation, diarrhea, nausea and vomiting. Genitourinary:  Negative for difficulty urinating and dysuria. Musculoskeletal:  Negative for arthralgias and myalgias. Skin:  Negative for rash and wound. Allergic/Immunologic: Negative for environmental allergies. Neurological:  Negative for headaches. Hematological:  Does not bruise/bleed easily. Psychiatric/Behavioral:  Positive for decreased concentration and dysphoric mood. Negative for agitation, self-injury, sleep disturbance and suicidal ideas. The patient is nervous/anxious and is hyperactive (at times). Significant improvement in overall mood. But lack to take medication daily. Finding therapy helpful, but also distracted easily. Objective   Physical Exam  Vitals reviewed. Constitutional:       Appearance: Normal appearance. Cardiovascular:      Rate and Rhythm: Normal rate and regular rhythm. Heart sounds: Normal heart sounds. No murmur heard. Musculoskeletal:      Cervical back: No rigidity or tenderness. Skin:     General: Skin is warm and dry. Neurological:      Mental Status: She is alert and oriented to person, place, and time. Motor: No weakness. Psychiatric:         Attention and Perception: Attention normal.         Mood and Affect: Mood is anxious. Speech: Speech normal.         Behavior: Behavior normal. Behavior is cooperative. (9/9):   Total points:15

## 2022-08-29 PROBLEM — F32.A DEPRESSION: Status: ACTIVE | Noted: 2022-08-29

## 2022-08-29 PROBLEM — F90.2 ATTENTION DEFICIT HYPERACTIVITY DISORDER (ADHD), COMBINED TYPE: Status: ACTIVE | Noted: 2022-08-29

## 2022-08-29 ASSESSMENT — ENCOUNTER SYMPTOMS
EYE DISCHARGE: 0
SORE THROAT: 0
COUGH: 0
NAUSEA: 0
SHORTNESS OF BREATH: 0
VOMITING: 0
CONSTIPATION: 0
DIARRHEA: 0

## 2022-09-08 ENCOUNTER — TELEMEDICINE (OUTPATIENT)
Dept: BEHAVIORAL/MENTAL HEALTH CLINIC | Age: 22
End: 2022-09-08
Payer: COMMERCIAL

## 2022-09-08 DIAGNOSIS — F43.10 PTSD (POST-TRAUMATIC STRESS DISORDER): Primary | ICD-10-CM

## 2022-09-08 PROCEDURE — 1036F TOBACCO NON-USER: CPT | Performed by: SOCIAL WORKER

## 2022-09-08 PROCEDURE — 90832 PSYTX W PT 30 MINUTES: CPT | Performed by: SOCIAL WORKER

## 2022-09-08 NOTE — PROGRESS NOTES
ADULT BEHAVIORAL HEALTH FOLLOW UP  Andee Sandhoff, LISW-S  Licensed Independent        Visit Date: 9/8/2022   Time of appointment:  [id number]   Time spent with Patient: 30 minutes. This is patient's seventh appointment. Reason for Consult:  Anxiety and Depression     Referring Provider/PCP:    No ref. provider found  BRIANA Fernandez - CNP      Pt provided informed consent for the behavioral health program. Discussed with patient model of service to include the limits of confidentiality (i.e. abuse reporting, suicide intervention, etc.) and short-term intervention focused approach. Pt indicated understanding. Telehealth session conducted via [employer]. me, pt in privacy of home, in [address] [place] [address] jeanna Johnson located in [place], [place]. Sofi Jones is a 25 y.o. female who presents for follow up of anxiety and depression. She reports increased anxiety with school and work, feeling behind due to vacation and trying to balance both. She also reports some depressed mood, processed ways to cope with this, with encouragement to find time for her own self care among all the tasks and stressors. She acknowledges lack of priority with this, reviewed how this can affect mood slowly over time and ways to make adjustments, including ADLs, water intake, and sleep, in order to decrease vulnerability to more intense emotions. Previous Recommendations: Krystle Foy will work to find ways to [address] herself when upset and notice warning signs and triggers for depressed mood. She will follow up with Daisy Silva in 3 weeks. MENTAL STATUS EXAM  Mood was sad with sad  affect. Suicidal ideation was denied. Homicidal ideation was denied. Hygiene was good . Dress was appropriate. Behavior was Within Normal Limits with No observation or self-report of difficulties ambulating. Attitude was Cooperative. Eye-contact was good. Speech: rate - WNL, rhythm - WNL, volume - WNL.   Verbalizations were coherent. Thought processes were intact and goal-oriented without evidence of delusions, hallucinations, obsessions, or segundo; with moderate cognitive distortions. Associations were characterized by intact cognitive processes. Pt was oriented to person, place, time, and general circumstances;  recent:  good and remote:  good. Insight and judgment were estimated to be fair, AEB, a fair  understanding of cyclical maladaptive patterns, and the ability to use insight to inform behavior change. ASSESSMENT  Michoacano Henderson presented to the appointment today for evaluation and treatment of symptoms of depression and anxiety. She is currently deemed no risk to herself or others and meets criteria for PTSD. Jaky Titus was in agreement with recommendations. PHQ Scores 4/7/2022   PHQ2 Score 5   PHQ9 Score 20     Interpretation of Total Score Depression Severity: 1-4 = Minimal depression, 5-9 = Mild depression, 10-14 = Moderate depression, 15-19 = Moderately severe depression, 20-27 = Severe depression    How often pt has had thoughts of death or hurting self (if PHQ positive for depression):       SHARONDA 7 SCORE 4/7/2022   SHARONDA-7 Total Score 17     Interpretation of SHARONDA-7 score: 5-9 = mild anxiety, 10-14 = moderate anxiety, 15+ = severe anxiety. Recommend referral to behavioral health for scores 10 or greater. DIAGNOSIS  Jaky Titus was seen today for anxiety and depression. Diagnoses and all orders for this visit:    PTSD (post-traumatic stress disorder)        INTERVENTION  Trained in strategies for increasing balanced thinking, Discussed self-care (sleep, nutrition, rewarding activities, social support, exercise), Motivational Interviewing to determine importance and readiness for change, Discussed potential barriers to change, and Supportive techniques      PLAN  Jaky Titus will increase daily self care activities. She will follow up with Temitope Cuevas in 2 weeks.       INTERACTIVE COMPLEXITY  Is interactive complexity present?   No  Reason:  N/A  Additional Supporting Information:  N/A       Electronically signed by PRO Chandler on 9/8/22 at 9:30 AM EDT

## 2022-09-23 ENCOUNTER — TELEPHONE (OUTPATIENT)
Dept: BEHAVIORAL/MENTAL HEALTH CLINIC | Age: 22
End: 2022-09-23

## 2022-10-27 ENCOUNTER — TELEMEDICINE (OUTPATIENT)
Dept: BEHAVIORAL/MENTAL HEALTH CLINIC | Age: 22
End: 2022-10-27
Payer: COMMERCIAL

## 2022-10-27 DIAGNOSIS — F43.10 PTSD (POST-TRAUMATIC STRESS DISORDER): Primary | ICD-10-CM

## 2022-10-27 PROCEDURE — 1036F TOBACCO NON-USER: CPT | Performed by: SOCIAL WORKER

## 2022-10-27 PROCEDURE — 90832 PSYTX W PT 30 MINUTES: CPT | Performed by: SOCIAL WORKER

## 2022-10-27 NOTE — PROGRESS NOTES
ADULT BEHAVIORAL HEALTH FOLLOW UP  MICK Wilcox-S  Licensed Independent        Visit Date: 10/27/2022   Time of appointment:  [id number]   Time spent with Patient: 30 minutes. This is patient's eighth appointment. Reason for Consult:  Depression     Referring Provider/PCP:    No ref. provider found  Lasha Murdock, APRN - CNP      Pt provided informed consent for the behavioral health program. Discussed with patient model of service to include the limits of confidentiality (i.e. abuse reporting, suicide intervention, etc.) and short-term intervention focused approach. Pt indicated understanding. Telehealth session conducted via [employer]. me, pt in privacy of home, in [place], clinician located in [place], [place]. Roberto Jo is a 25 y.o. female who presents for follow up of depression. She reports increased stress due to increased work hours and school, resulting in increased isolation and depression mood. She also reports difficulty moving forward from breakup, processed barriers to this and ways to be more gentle with herself. We reviewed ways to increase self care given her busy schedule, including increasing sunlight exposure, light therapy, and utilizing support people. Previous Recommendations: Nicolas Hadley will increase daily self care activities. She will follow up with Fred Neal in 2 weeks. MENTAL STATUS EXAM  Mood was sad with sad  affect. Suicidal ideation was denied. Homicidal ideation was denied. Hygiene was fair . Dress was appropriate. Behavior was Within Normal Limits with No observation or self-report of difficulties ambulating. Attitude was Cooperative. Eye-contact was good. Speech: rate - WNL, rhythm - WNL, volume - WNL. Verbalizations were coherent. Thought processes were intact and goal-oriented without evidence of delusions, hallucinations, obsessions, or segundo; with moderate cognitive distortions.    Associations were characterized by intact cognitive processes. Pt was oriented to person, place, time, and general circumstances;  recent:  good and remote:  good. Insight and judgment were estimated to be fair, AEB, a fair  understanding of cyclical maladaptive patterns, and the ability to use insight to inform behavior change. ASSESSMENT  Ruben Armstrong presented to the appointment today for evaluation and treatment of symptoms of depression. She is currently deemed no risk to herself or others and meets criteria for PTSD. Ella Mike was in agreement with recommendations. PHQ Scores 4/7/2022   PHQ2 Score 5   PHQ9 Score 20     Interpretation of Total Score Depression Severity: 1-4 = Minimal depression, 5-9 = Mild depression, 10-14 = Moderate depression, 15-19 = Moderately severe depression, 20-27 = Severe depression    How often pt has had thoughts of death or hurting self (if PHQ positive for depression):       SHARONDA 7 SCORE 4/7/2022   SHARONDA-7 Total Score 17     Interpretation of SHARONDA-7 score: 5-9 = mild anxiety, 10-14 = moderate anxiety, 15+ = severe anxiety. Recommend referral to behavioral health for scores 10 or greater. DIAGNOSIS  Ella Mike was seen today for depression. Diagnoses and all orders for this visit:    PTSD (post-traumatic stress disorder)        INTERVENTION  Trained in strategies for increasing balanced thinking, Trained in improving communication skills, Discussed self-care (sleep, nutrition, rewarding activities, social support, exercise), Discussed potential barriers to change, Supportive techniques, and Provided Psychoeducation re: emotion expression      Darshan Denis will try to increase healthy self care and emotion expression. She will follow up with Matt Forte in 2 weeks. INTERACTIVE COMPLEXITY  Is interactive complexity present?   No  Reason:  N/A  Additional Supporting Information:  N/A       Electronically signed by PRO Rick on 10/27/22 at 9:31 AM EDT

## 2022-11-11 ENCOUNTER — TELEMEDICINE (OUTPATIENT)
Dept: BEHAVIORAL/MENTAL HEALTH CLINIC | Age: 22
End: 2022-11-11
Payer: COMMERCIAL

## 2022-11-11 DIAGNOSIS — F43.10 PTSD (POST-TRAUMATIC STRESS DISORDER): Primary | ICD-10-CM

## 2022-11-11 PROCEDURE — 90832 PSYTX W PT 30 MINUTES: CPT | Performed by: SOCIAL WORKER

## 2022-11-11 PROCEDURE — 1036F TOBACCO NON-USER: CPT | Performed by: SOCIAL WORKER

## 2022-11-11 NOTE — PROGRESS NOTES
ADULT BEHAVIORAL HEALTH FOLLOW UP  Cla[place] RPO Lugo  Licensed Independent        Visit Date: 11/11/2022   Time of appointment:  [id number]   Time spent with Patient: 36 minutes. This is patient's ninth appointment. Reason for Consult:  Depression and Anxiety     Referring Provider/PCP:    No ref. provider found  BRIANA Bernabe CNP      Pt provided informed consent for the behavioral health program. Discussed with patient model of service to include the limits of confidentiality (i.e. abuse reporting, suicide intervention, etc.) and short-term intervention focused approach. Pt indicated understanding. Telehealth session conducted via [employer]. me, pt in privacy of home, in [place], clinician located in [facility]. Linh Mueller is a 25 y.o. female who presents for follow up of depression and anxiety. She reports questions about Wellbutrin, stopped after 1 month, feels it was not helpful. We reviewed other options, encouraged her to ask PCP for genesight testing to get a better idea of how to proceed. She reports a recent intrusive thought to take a medication she knows she has bad reactions to in order to get a break and be hospitalized. She denies suicidal thoughts, we talked about how to listen to this thought as a warning something is wrong, she needs a break. As we spoke her boss texted asking her to come in on her day off, she became very anxious at this. We processed her options, including high difficulty saying no, ways to start doing so and listen to her body of what she needs. We will continue to work on people pleasing and saying no. Previous Recommendations: Emma Rizo will try to increase healthy self care and emotion expression. She will follow up with Orlando Kenney in 2 weeks. MENTAL STATUS EXAM  Mood was anxious with anxious affect. Suicidal ideation was denied. Homicidal ideation was denied. Hygiene was good . Dress was appropriate.    Behavior was Within Normal Limits with No observation or self-report of difficulties ambulating. Attitude was Cooperative. Eye-contact was good. Speech: rate - WNL, rhythm - WNL, volume - WNL. Verbalizations were coherent. Thought processes were intact and goal-oriented without evidence of delusions, hallucinations, obsessions, or segundo; with moderate cognitive distortions. Associations were characterized by intact cognitive processes. Pt was oriented to person, place, time, and general circumstances;  recent:  good and remote:  good. Insight and judgment were estimated to be fair, AEB, a fair  understanding of cyclical maladaptive patterns, and the ability to use insight to inform behavior change. ASSESSMENT  Brad Dixon presented to the appointment today for evaluation and treatment of symptoms of anxiety and depression. She is currently deemed no risk to herself or others and meets criteria for PTSD. Eulalio Maxwell was in agreement with recommendations. PHQ Scores 4/7/2022   PHQ2 Score 5   PHQ9 Score 20     Interpretation of Total Score Depression Severity: 1-4 = Minimal depression, 5-9 = Mild depression, 10-14 = Moderate depression, 15-19 = Moderately severe depression, 20-27 = Severe depression    How often pt has had thoughts of death or hurting self (if PHQ positive for depression):       SHARONDA 7 SCORE 4/7/2022   SHARONDA-7 Total Score 17     Interpretation of SHARONDA-7 score: 5-9 = mild anxiety, 10-14 = moderate anxiety, 15+ = severe anxiety. Recommend referral to behavioral health for scores 10 or greater. DIAGNOSIS  Eulalio Maxwell was seen today for depression and anxiety.     Diagnoses and all orders for this visit:    PTSD (post-traumatic stress disorder)        INTERVENTION  Trained in strategies for increasing balanced thinking, Trained in improving communication skills, Discussed self-care (sleep, nutrition, rewarding activities, social support, exercise), Discussed potential barriers to change, and Supportive techniques      PLAN    will follow up with PCP for medication options, will follow up with Mino Melendez in 3 weeks. INTERACTIVE COMPLEXITY  Is interactive complexity present?   No  Reason:  N/A  Additional Supporting Information:  N/A       Electronically signed by PRO Tello on 11/11/22 at 10:12 AM EST

## 2022-11-17 ENCOUNTER — TELEMEDICINE (OUTPATIENT)
Dept: FAMILY MEDICINE CLINIC | Age: 22
End: 2022-11-17
Payer: COMMERCIAL

## 2022-11-17 DIAGNOSIS — F41.9 ANXIETY: ICD-10-CM

## 2022-11-17 DIAGNOSIS — F33.9 CHRONIC MAJOR DEPRESSIVE DISORDER, RECURRENT EPISODE (HCC): Primary | ICD-10-CM

## 2022-11-17 DIAGNOSIS — F43.10 PTSD (POST-TRAUMATIC STRESS DISORDER): ICD-10-CM

## 2022-11-17 PROCEDURE — 99214 OFFICE O/P EST MOD 30 MIN: CPT

## 2022-11-17 PROCEDURE — G8419 CALC BMI OUT NRM PARAM NOF/U: HCPCS

## 2022-11-17 PROCEDURE — 1036F TOBACCO NON-USER: CPT

## 2022-11-17 PROCEDURE — G8484 FLU IMMUNIZE NO ADMIN: HCPCS

## 2022-11-17 PROCEDURE — G8427 DOCREV CUR MEDS BY ELIG CLIN: HCPCS

## 2022-11-17 RX ORDER — FLUOXETINE 10 MG/1
10 CAPSULE ORAL DAILY
Qty: 60 CAPSULE | Refills: 0 | Status: SHIPPED | OUTPATIENT
Start: 2022-11-17 | End: 2022-12-17

## 2022-11-17 ASSESSMENT — ENCOUNTER SYMPTOMS
NAUSEA: 0
DIARRHEA: 0
SHORTNESS OF BREATH: 0
SORE THROAT: 0
VOMITING: 0
COUGH: 0
EYE DISCHARGE: 0
CONSTIPATION: 0

## 2022-11-17 NOTE — PROGRESS NOTES
Davidson Albarado (:  2000) is a Established patient, here for evaluation of the following:    Assessment & Plan   Below is the assessment and plan developed based on review of pertinent history, physical exam, labs, studies, and medications. 1. Chronic major depressive disorder, recurrent episode ([facility] [address]  -     External Referral To Psychiatry  -     FLUoxetine (PROZAC) 10 MG capsule; Take 1 capsule by mouth daily For 7 days, then increase to 2 capsules there after., Disp-60 capsule, R-0Normal  2. PTSD (post-traumatic stress disorder)  -     External Referral To Psychiatry  -     FLUoxetine (PROZAC) 10 MG capsule; Take 1 capsule by mouth daily For 7 days, then increase to 2 capsules there after., Disp-60 capsule, R-0Normal  3. Anxiety  -     External Referral To Psychiatry  -     FLUoxetine (PROZAC) 10 MG capsule; Take 1 capsule by mouth daily For 7 days, then increase to 2 capsules there after., Disp-60 capsule, R-0Normal    Continue therapy   Call to establish with psych  May come in for nurse visit to complete genesight test  Possible introduction of Olanzapine or Rexulti  if slight improvement noted with Prozac. Return in about 4 weeks (around 12/15/2022) for VV. Subjective   Virtual visit for her ongoing anxiety, depression, PTSD, and ADHD. Patient was started on Wellbutrin 150 mg, and completed for 90 days. She states she noted absolutely no improvement during that time. She does continue therapy with Carmen. Patient has had failed medications that include Zoloft   (Significant vomiting), venlafaxine, without any noted improvement, and now the Wellbutrin. Patient does continue to have significant depression, some panic, and difficulty focusing. At this time I would like to focus more on her depression, as she states she will have dark thoughts and thoughts of suicide, however denies any plan.   She states that if she did feel she was going to harm herself in any way she would seek emergency treatment. We did discuss potentially doing a GeneSight test, and she will reach out to her insurance to see what her available coverage is. Patient is not a candidate for stimulant therapy for ADHD, as she does have a drug abuse history. We also considered starting Strattera, however at this time I feel that trying to find some improvement in her depression and anxiety is priority. Patient is agreeable. We also discussed referral to psychiatry for further work-up and evaluation for diagnostic purposes. We will place this referral today. Patient is okay with trying Prozac, we will start this very low dose and increase to 20 mg. We will also consider potentially introducing olanzapine or Rexulti. Review of Systems   Constitutional:  Negative for activity change, fatigue and fever. HENT:  Negative for dental problem and sore throat. Eyes:  Negative for discharge and visual disturbance. Respiratory:  Negative for cough and shortness of breath. Cardiovascular:  Negative for chest pain, palpitations and leg swelling. Gastrointestinal:  Negative for constipation, diarrhea, nausea and vomiting. Genitourinary:  Negative for difficulty urinating and dysuria. Musculoskeletal:  Negative for arthralgias and myalgias. Skin:  Negative for rash and wound. Allergic/Immunologic: Negative for environmental allergies. Neurological:  Negative for headaches. Hematological:  Does not bruise/bleed easily. Psychiatric/Behavioral:  Positive for decreased concentration, dysphoric mood and suicidal ideas (no plan- dark thoughts- states she would seek medical emergency treatment if she felt she was going to execute anything). Negative for agitation and sleep disturbance. The patient is nervous/anxious.          Objective   Patient-Reported Vitals  Patient-Reported Weight: 160 lbs  Patient-Reported Height: 5'4\"       Physical Exam  [INSTRUCTIONS:  \"[x]\" Indicates a positive item  \"[]\" Indicates a negative item  -- DELETE ALL ITEMS NOT EXAMINED]    Constitutional: [x] Appears well-developed and well-nourished [x] No apparent distress      [] Abnormal -     Mental status: [x] Alert and awake  [x] Oriented to person/place/time [x] Able to follow commands    [] Abnormal -     Eyes:   EOM    [x]  Normal    [] Abnormal -   Sclera  [x]  Normal    [] Abnormal -          Discharge [x]  None visible   [] Abnormal -     HENT: [x] Normocephalic, atraumatic  [] Abnormal -   [x] Mouth/Throat: Mucous membranes are moist    External Ears [x] Normal  [] Abnormal -    Neck: [x] No visualized mass [] Abnormal -     Pulmonary/Chest: [x] Respiratory effort normal   [x] No visualized signs of difficulty breathing or respiratory distress        [] Abnormal -      Musculoskeletal:   [x] Normal gait with no signs of ataxia         [x] Normal range of motion of neck        [] Abnormal -     Neurological:        [x] No Facial Asymmetry (Cranial nerve 7 motor function) (limited exam due to video visit)          [x] No gaze palsy        [] Abnormal -          Skin:        [x] No significant exanthematous lesions or discoloration noted on facial skin         [] Abnormal -            Psychiatric:       [x] Normal Affect [] Abnormal -        [x] No Hallucinations    Other pertinent observable physical exam findings:-  Patient appropriate and interactive throughout VV. She is at clinical sight in [place]. On this date 11/17/2022 I have spent 31 minutes reviewing previous notes, test results and face to face (virtual) with the patient discussing the diagnosis and importance of compliance with the treatment plan as well as documenting on the day of the visit. Javon Hensley, was evaluated through a synchronous (real-time) audio-video encounter. The patient (or guardian if applicable) is aware that this is a billable service, which includes applicable co-pays.  This Virtual Visit was conducted with patient's (and/or legal guardian's) consent. The visit was conducted pursuant to the emergency declaration under the [address], [facility] waiver authority and the [employer] Act. Patient identification was verified, and a caregiver was present when appropriate. The patient was located at Other: Hospital in [facility] as she is at her clinicals . Provider was located at [facility] ([address] [place] [address]): [address] [facility],  [address] [facility].         --Cindy Whitfield, BRIANA - CNP

## 2023-01-19 ENCOUNTER — OFFICE VISIT (OUTPATIENT)
Dept: FAMILY MEDICINE CLINIC | Age: 23
End: 2023-01-19
Payer: COMMERCIAL

## 2023-01-19 VITALS
SYSTOLIC BLOOD PRESSURE: 120 MMHG | HEART RATE: 102 BPM | OXYGEN SATURATION: 98 % | WEIGHT: 166.8 LBS | HEIGHT: 64 IN | BODY MASS INDEX: 28.48 KG/M2 | DIASTOLIC BLOOD PRESSURE: 88 MMHG

## 2023-01-19 DIAGNOSIS — B96.89 BACTERIAL CONJUNCTIVITIS OF BOTH EYES: Primary | ICD-10-CM

## 2023-01-19 DIAGNOSIS — H10.9 BACTERIAL CONJUNCTIVITIS OF BOTH EYES: Primary | ICD-10-CM

## 2023-01-19 PROCEDURE — G8419 CALC BMI OUT NRM PARAM NOF/U: HCPCS

## 2023-01-19 PROCEDURE — 99213 OFFICE O/P EST LOW 20 MIN: CPT

## 2023-01-19 PROCEDURE — G8427 DOCREV CUR MEDS BY ELIG CLIN: HCPCS

## 2023-01-19 PROCEDURE — G8482 FLU IMMUNIZE ORDER/ADMIN: HCPCS

## 2023-01-19 PROCEDURE — 1036F TOBACCO NON-USER: CPT

## 2023-01-19 RX ORDER — OFLOXACIN 3 MG/ML
2 SOLUTION/ DROPS OPHTHALMIC 4 TIMES DAILY
Qty: 10 ML | Refills: 0 | Status: SHIPPED | OUTPATIENT
Start: 2023-01-19 | End: 2023-01-29

## 2023-01-19 NOTE — PROGRESS NOTES
Anthony Dougherty (:  2000) is a 25 y.o. female,Established patient, here for evaluation of the following chief complaint(s):  Eye Pain (Started in the left eye, left eye has improved pain wise -  but is now red. /Right eye is in pain. This all started about 2 weeks ago. Both eyes feel like they have sand in them. )         ASSESSMENT/PLAN:  1. Bacterial conjunctivitis of both eyes  -     ofloxacin (OCUFLOX) 0.3 % solution; Place 2 drops into both eyes 4 times daily for 10 days, Disp-10 mL, R-0Normal    Return if symptoms worsen or fail to improve. Subjective   SUBJECTIVE/OBJECTIVE:  Denies any chemical contacts. Has been wearing glasses. Worsening symptoms with increasing pain and redness in both eyes. Conjunctivitis   The current episode started more than 1 week ago. The onset was gradual. The problem occurs continuously. The problem has been gradually worsening. The problem is severe. Nothing relieves the symptoms. Nothing aggravates the symptoms. Associated symptoms include eye itching (feels like \"sand paper\"), eye discharge and eye redness. Pertinent negatives include no fever, no decreased vision, no abdominal pain, no constipation, no diarrhea, no nausea, no ear discharge, no ear pain, no mouth sores, no cough and no wheezing. The eye pain is moderate. Both eyes are affected. The eyelid exhibits redness. She has been Behaving normally. She has been Eating and drinking normally. There were no sick contacts. Review of Systems   Constitutional:  Negative for fever. HENT:  Negative for ear discharge, ear pain and mouth sores. Eyes:  Positive for discharge, redness and itching (feels like \"sand paper\"). Respiratory:  Negative for cough and wheezing. Gastrointestinal:  Negative for abdominal pain, constipation, diarrhea and nausea. All other systems reviewed and are negative. Objective   Physical Exam  Vitals reviewed.    Constitutional:       General: She is not in acute distress. Appearance: She is not toxic-appearing. Eyes:      General:         Right eye: No hordeolum. Left eye: No hordeolum. Conjunctiva/sclera:      Right eye: Right conjunctiva is injected. Left eye: Left conjunctiva is injected. Comments: Erythematous Sclerae and Conjunctiva Bilaterally    Cardiovascular:      Rate and Rhythm: Normal rate and regular rhythm. Heart sounds: Normal heart sounds. No murmur heard. Pulmonary:      Effort: Pulmonary effort is normal. No respiratory distress. Breath sounds: Normal breath sounds. No wheezing. Skin:     General: Skin is warm and dry. Neurological:      Mental Status: She is alert and oriented to person, place, and time. Mental status is at baseline. Psychiatric:         Mood and Affect: Mood normal.         Behavior: Behavior normal.         Thought Content: Thought content normal.          On this date 1/19/2023 I have spent 10 minutes reviewing previous notes, test results and face to face with the patient discussing the diagnosis and importance of compliance with the treatment plan as well as documenting on the day of the visit. An electronic signature was used to authenticate this note.     --BRIANA Radford - CNP

## 2023-02-13 PROBLEM — H10.9 BACTERIAL CONJUNCTIVITIS OF BOTH EYES: Status: ACTIVE | Noted: 2023-02-13

## 2023-02-13 PROBLEM — B96.89 BACTERIAL CONJUNCTIVITIS OF BOTH EYES: Status: ACTIVE | Noted: 2023-02-13

## 2023-02-13 ASSESSMENT — ENCOUNTER SYMPTOMS
NAUSEA: 0
EYE ITCHING: 1
EYE REDNESS: 1
ABDOMINAL PAIN: 0
CONSTIPATION: 0
COUGH: 0
DIARRHEA: 0
EYE DISCHARGE: 1
WHEEZING: 0

## 2023-02-22 ENCOUNTER — OFFICE VISIT (OUTPATIENT)
Dept: PRIMARY CARE CLINIC | Age: 23
End: 2023-02-22

## 2023-02-22 ENCOUNTER — HOSPITAL ENCOUNTER (OUTPATIENT)
Age: 23
Setting detail: SPECIMEN
Discharge: HOME | End: 2023-02-22

## 2023-02-22 VITALS
OXYGEN SATURATION: 98 % | SYSTOLIC BLOOD PRESSURE: 128 MMHG | HEIGHT: 64 IN | WEIGHT: 168.2 LBS | BODY MASS INDEX: 28.71 KG/M2 | TEMPERATURE: 98.2 F | HEART RATE: 91 BPM | DIASTOLIC BLOOD PRESSURE: 82 MMHG

## 2023-02-22 DIAGNOSIS — R31.9 URINARY TRACT INFECTION WITH HEMATURIA, SITE UNSPECIFIED: ICD-10-CM

## 2023-02-22 DIAGNOSIS — R31.9 URINARY TRACT INFECTION WITH HEMATURIA, SITE UNSPECIFIED: Primary | ICD-10-CM

## 2023-02-22 DIAGNOSIS — N39.0 URINARY TRACT INFECTION WITH HEMATURIA, SITE UNSPECIFIED: Primary | ICD-10-CM

## 2023-02-22 DIAGNOSIS — N39.0 URINARY TRACT INFECTION WITH HEMATURIA, SITE UNSPECIFIED: ICD-10-CM

## 2023-02-22 LAB
BILIRUBIN, POC: NORMAL
BLOOD URINE, POC: 10
CLARITY, POC: NORMAL
COLOR, POC: NORMAL
GLUCOSE URINE, POC: NORMAL
KETONES, POC: NORMAL
LEUKOCYTE EST, POC: 70
NITRITE, POC: NORMAL
PH, POC: 6.5
PROTEIN, POC: NORMAL
SPECIFIC GRAVITY, POC: 1.02
UROBILINOGEN, POC: 0.2

## 2023-02-22 RX ORDER — NITROFURANTOIN 25; 75 MG/1; MG/1
100 CAPSULE ORAL 2 TIMES DAILY
Qty: 10 CAPSULE | Refills: 0 | Status: SHIPPED | OUTPATIENT
Start: 2023-02-22 | End: 2023-02-27

## 2023-02-22 ASSESSMENT — ENCOUNTER SYMPTOMS
NAUSEA: 0
DIARRHEA: 1
VOMITING: 0
ABDOMINAL PAIN: 0

## 2023-02-22 NOTE — PROGRESS NOTES
[address] WALK-IN  [address] [facility],  [address]  [address] [place] [address]  Dept: [phone]    Monalisa Javier is a 25 y.o. female Established patient, who presents to the walk-in clinic today with conditions/complaints as noted below:    Chief Complaint   Patient presents with    Urinary Pain     Burning when urinating, urinary frequency, pain in lower back. HPI:     Patient presents to walk in clinic with concerns about UTI. Has a history of UTIs. Reports frequency, urgency, and burning. Took old prescription of nitrofurantoin 1 tab today only because she was feeling desperate. Thinks medicine . Also has been using OTC Azo since . Urinary Tract Infection  This is a new problem. The current episode started in the past 7 days. Pertinent negatives include no hematuria. Risk factors include UTIs as a child. Past Medical History:   Diagnosis Date    Psoriasis        Current Outpatient Medications   Medication Sig Dispense Refill    nitrofurantoin, macrocrystal-monohydrate, (MACROBID) 100 MG capsule Take 1 capsule by mouth 2 times daily for 5 days 10 capsule 0    FLUoxetine (PROZAC) 10 MG capsule Take 1 capsule by mouth daily For 7 days, then increase to 2 capsules there after. (Patient not taking: No sig reported) 60 capsule 0    TREMFYA 100 MG/ML SOSY injection        No current facility-administered medications for this visit. Allergies   Allergen Reactions    Amoxicillin     Bactrim [Sulfamethoxazole-Trimethoprim]     Otezla [Apremilast]     Penicillins        :     Review of Systems   Constitutional:  Negative for activity change, chills, diaphoresis and fever. Gastrointestinal:  Positive for diarrhea. Negative for abdominal pain, nausea and vomiting. Genitourinary:  Positive for decreased urine volume, difficulty urinating, dysuria, flank pain, frequency and urgency.  Negative for hematuria, vaginal discharge and vaginal pain.   Skin:  Negative for rash.     :     /82   Pulse 91   Temp 98.2 °F (36.8 °C)   Ht 5' 4\" (1.626 m)   Wt 168 lb 3.2 oz (76.3 kg)   SpO2 98%   BMI 28.87 kg/m²     Physical Exam  Vitals and nursing note reviewed.   Constitutional:       Appearance: Normal appearance. She is not ill-appearing.   Cardiovascular:      Rate and Rhythm: Normal rate and regular rhythm.   Pulmonary:      Effort: Pulmonary effort is normal. No respiratory distress.      Breath sounds: Normal breath sounds. No wheezing, rhonchi or rales.   Abdominal:      General: There is no distension.      Palpations: Abdomen is soft.      Tenderness: There is abdominal tenderness in the suprapubic area. There is right CVA tenderness. There is no left CVA tenderness.   Skin:     General: Skin is warm and dry.      Findings: No rash.   Neurological:      Mental Status: She is alert and oriented to person, place, and time.   Psychiatric:         Mood and Affect: Mood normal.         Behavior: Behavior normal.         :          1. Urinary tract infection with hematuria, site unspecified  -     POCT Urinalysis No Micro (Auto)  -     nitrofurantoin, macrocrystal-monohydrate, (MACROBID) 100 MG capsule; Take 1 capsule by mouth 2 times daily for 5 days, Disp-10 capsule, R-0Normal  -     Culture, Urine; Future       Results for orders placed or performed in visit on 02/22/23   POCT Urinalysis No Micro (Auto)   Result Value Ref Range    Color, UA      Clarity, UA      Glucose, UA POC -     Bilirubin, UA -     Ketones, UA -     Spec Grav, UA 1.020     Blood, UA POC 10     pH, UA 6.5     Protein, UA POC -     Urobilinogen, UA 0.2     Leukocytes, UA 70     Nitrite, UA +        UA POCT completed and results discussed with patient.   Will send for urine culture.   Antibiotic as prescribed.   Call office with concerns.   If symptoms persist, follow up with PCP.   :      Return if symptoms worsen or fail to improve.    Orders Placed  This Encounter   Medications    nitrofurantoin, macrocrystal-monohydrate, (MACROBID) 100 MG capsule     Sig: Take 1 capsule by mouth 2 times daily for 5 days     Dispense:  10 capsule     Refill:  0             Patient and/or parent given educational materials - see patient instructions. Discussed use, benefit, and side effects of prescribed medications. All patient questions answered. Patient and/or parent voiced understanding.       Electronically signed by BRIANA Lee 2/22/2023 at 2:59 PM

## 2023-02-23 LAB
MICROORGANISM SPEC CULT: NORMAL
SPECIMEN DESCRIPTION: NORMAL

## 2023-07-19 ENCOUNTER — HOSPITAL ENCOUNTER (OUTPATIENT)
Dept: MRI IMAGING | Age: 23
Discharge: HOME | End: 2023-07-21
Payer: COMMERCIAL

## 2023-07-19 DIAGNOSIS — R52 PAIN: ICD-10-CM

## 2023-07-19 PROCEDURE — 74181 MRI ABDOMEN W/O CONTRAST: CPT

## 2023-08-07 ENCOUNTER — HOSPITAL ENCOUNTER (OUTPATIENT)
Dept: MRI IMAGING | Age: 23
Discharge: HOME | End: 2023-08-09
Payer: COMMERCIAL

## 2023-08-07 DIAGNOSIS — R52 PAIN: ICD-10-CM

## 2023-08-07 PROCEDURE — 72141 MRI NECK SPINE W/O DYE: CPT

## 2023-08-07 PROCEDURE — 70551 MRI BRAIN STEM W/O DYE: CPT

## 2023-08-09 ENCOUNTER — HOSPITAL ENCOUNTER (OUTPATIENT)
Dept: MRI IMAGING | Age: 23
Discharge: HOME | End: 2023-08-11
Payer: COMMERCIAL

## 2023-08-09 DIAGNOSIS — R52 PAIN: ICD-10-CM

## 2023-08-09 PROCEDURE — 73721 MRI JNT OF LWR EXTRE W/O DYE: CPT

## 2023-10-14 ENCOUNTER — APPOINTMENT (OUTPATIENT)
Dept: GENERAL RADIOLOGY | Age: 23
End: 2023-10-14
Payer: COMMERCIAL

## 2023-10-14 ENCOUNTER — HOSPITAL ENCOUNTER (EMERGENCY)
Age: 23
Discharge: HOME | End: 2023-10-14
Attending: EMERGENCY MEDICINE
Payer: COMMERCIAL

## 2023-10-14 ENCOUNTER — APPOINTMENT (OUTPATIENT)
Dept: CT IMAGING | Age: 23
End: 2023-10-14
Payer: COMMERCIAL

## 2023-10-14 VITALS
TEMPERATURE: 97.8 F | OXYGEN SATURATION: 99 % | HEIGHT: 64 IN | HEART RATE: 90 BPM | WEIGHT: 164 LBS | DIASTOLIC BLOOD PRESSURE: 85 MMHG | SYSTOLIC BLOOD PRESSURE: 150 MMHG | RESPIRATION RATE: 18 BRPM | BODY MASS INDEX: 28 KG/M2

## 2023-10-14 DIAGNOSIS — S00.83XA CONTUSION OF FACE, INITIAL ENCOUNTER: ICD-10-CM

## 2023-10-14 DIAGNOSIS — S90.121A CONTUSION OF RIGHT FOOT INCLUDING TOES, INITIAL ENCOUNTER: ICD-10-CM

## 2023-10-14 DIAGNOSIS — M62.838 CERVICAL PARASPINAL MUSCLE SPASM: ICD-10-CM

## 2023-10-14 DIAGNOSIS — S01.81XA FACIAL LACERATION, INITIAL ENCOUNTER: ICD-10-CM

## 2023-10-14 DIAGNOSIS — S80.212A ABRASION OF LEFT KNEE, INITIAL ENCOUNTER: ICD-10-CM

## 2023-10-14 DIAGNOSIS — S80.01XA CONTUSION OF RIGHT KNEE, INITIAL ENCOUNTER: ICD-10-CM

## 2023-10-14 DIAGNOSIS — S09.90XA INJURY OF HEAD, INITIAL ENCOUNTER: Primary | ICD-10-CM

## 2023-10-14 DIAGNOSIS — S90.31XA CONTUSION OF RIGHT FOOT INCLUDING TOES, INITIAL ENCOUNTER: ICD-10-CM
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
## 2023-10-14 PROCEDURE — 70450 CT HEAD/BRAIN W/O DYE: CPT

## 2023-10-14 PROCEDURE — 12011 RPR F/E/E/N/L/M 2.5 CM/<: CPT

## 2023-10-14 PROCEDURE — 72125 CT NECK SPINE W/O DYE: CPT

## 2023-10-14 PROCEDURE — 73630 X-RAY EXAM OF FOOT: CPT

## 2023-10-14 PROCEDURE — 99284 EMERGENCY DEPT VISIT MOD MDM: CPT

## 2023-10-14 PROCEDURE — 73562 X-RAY EXAM OF KNEE 3: CPT

## 2023-10-14 PROCEDURE — 70486 CT MAXILLOFACIAL W/O DYE: CPT

## 2023-10-14 PROCEDURE — 6370000000 HC RX 637 (ALT 250 FOR IP): Performed by: EMERGENCY MEDICINE

## 2023-10-14 RX ORDER — ACETAMINOPHEN 500 MG
1000 TABLET ORAL ONCE
Status: COMPLETED | OUTPATIENT
Start: 2023-10-14 | End: 2023-10-14

## 2023-10-14 RX ADMIN — ACETAMINOPHEN 1000 MG: 500 TABLET ORAL at 01:51

## 2023-10-14 NOTE — ED PROVIDER NOTES
[facility]  Phone: [phone]      Pt Name: Shayy Tejada  YYV:[id number]  Birthdate 2000  Date of evaluation: 10/14/2023      CHIEF COMPLAINT       Chief Complaint   Patient presents with    Laceration     Left temple laceration after states fell down wooden stairs. HISTORY OF PRESENT ILLNESS   Shayy Tejada is a 21 y.o. female who presents for evaluation of a fall down the stairs. The patient reports that around 1 AM tonight she accidentally tripped and fell down approximately 7-8 wooden steps. She does admit to having a few alcoholic beverages tonight. She struck the left side of her face, her right knee, and her right foot, mostly on the right great toe. The patient is unsure if she lost consciousness. She was able to get up on her own. She developed a laceration to the left side of her face during the incident. Her tetanus shot is up-to-date. She does not take any anticoagulants. Since the incident the patient has had a constant, dull, achy, progressive, left-sided headache, neck pain, right knee pain, and pain to her right great toe. She denies any history of concussion or any previous injury or surgery to the right knee or right foot. She also is a small abrasion to her left knee. The patient denies any chronic medical problems or surgeries. She denies fever, chills, vision changes, chest pain, shortness of breath, abdominal pain, nausea, vomiting, photophobia, urinary/bowel symptoms, focal weakness, numbness, tingling, or recent illness. REVIEW OF SYSTEMS     Positive: Headache, left facial laceration, neck pain, right knee pain, right toe pain  Ten point review of systems was reviewed and is negative unless otherwise noted in the HPI    [address] [facility] [place]    has a past medical history of Psoriasis. SURGICAL HISTORY      has no past surgical history on file.   Patient denies    CURRENT MEDICATIONS XR KNEE RIGHT (3 VIEWS)    Result Date: 10/14/2023  EXAMINATION: THREE XRAY VIEWS OF THE RIGHT KNEE 10/14/2023 2:37 am COMPARISON: None. HISTORY: ORDERING SYSTEM PROVIDED HISTORY: fall, right knee pain TECHNOLOGIST PROVIDED HISTORY: fall, right knee pain Reason for Exam: Fall right knee pain FINDINGS: There is no acute fracture or suspect osseous lesion. Joint spaces and alignment are maintained. No soft tissue swelling or joint effusion is evident. No acute osseous abnormality of the right knee. XR FOOT RIGHT (MIN 3 VIEWS)    Result Date: 10/14/2023  EXAMINATION: THREE XRAY VIEWS OF THE RIGHT FOOT 10/14/2023 2:37 am COMPARISON: None. HISTORY: ORDERING SYSTEM PROVIDED HISTORY: injured right great toe when fell on stairs TECHNOLOGIST PROVIDED HISTORY: injured right great toe when fell on stairs Reason for Exam: injured right great toe when fell on stairs FINDINGS: There is no acute fracture or suspect osseous lesion. Joint spaces and alignment are maintained. No soft tissue swelling is evident. No acute osseous abnormality of the right foot. CT Head W/O Contrast    Result Date: 10/14/2023  EXAMINATION: CT OF THE HEAD WITHOUT CONTRAST  10/14/2023 2:11 am TECHNIQUE: CT of the head was performed without the administration of intravenous contrast. Automated exposure control, iterative reconstruction, and/or weight based adjustment of the mA/kV was utilized to reduce the radiation dose to as low as reasonably achievable. COMPARISON: None. HISTORY: ORDERING SYSTEM PROVIDED HISTORY: fall down wooden stairs, struck head, ? LOC TECHNOLOGIST PROVIDED HISTORY: fall down wooden stairs, struck head, ? LOC Decision Support Exception - unselect if not a suspected or confirmed emergency medical condition->Emergency Medical Condition (MA) Reason for Exam: fall down wooden stairs, struck head, ? LOC FINDINGS: BRAIN/VENTRICLES: There is no acute intracranial hemorrhage, mass effect or midline shift.   No abnormal

## 2023-10-14 NOTE — DISCHARGE INSTRUCTIONS
Call today or tomorrow to follow up with BRIANA Fox CNP  in 2-3 days. Use ibuprofen or Tylenol (unless prescribed medications that have Tylenol in it) for pain. You can take over the counter Ibuprofen (advil) tablets (4 tablets every 8 hours or 3 tablets every 6 hours or 2 tablets every 4 hours). You can shower with the laceration, would avoid baths or swimming in lakes / rivers. DO NOT apply bacitracin / triple antibiotic ointment / Neosporin / Vaseline to the wound until the glue falls off. The glue will fall off in 5 - 7 days. After that you can apply sunscreen to the healing wound when outside to help with scarring. Return to the emergency department for worsening of pain, fever > 101.5, redness around the wound or redness streaking up the body part, white drainage from wound. PLEASE RETURN TO THE EMERGENCY DEPARTMENT IMMEDIATELY for worsening symptoms, persistent headache, change in vision / hearing / taste, ringing in your ears, sleeping more than normal, or if you develop any concerning symptoms such as: high fever not relieved by acetaminophen (Tylenol) and/or ibuprofen (Motrin / Advil), chills, shortness of breath, chest pain, feeling of your heart fluttering or racing, persistent nausea and/or vomiting, vomiting up blood, blood in your stool, loss of consciousness, numbness, weakness or tingling in the arms or legs or change in color of the extremities, changes in mental status, blurry vision, loss of bladder / bowel control, unable to follow up with your physician, or other any other care or concern.

## 2023-11-21 ENCOUNTER — HOSPITAL ENCOUNTER (OUTPATIENT)
Age: 23
Discharge: HOME | End: 2023-11-21
Payer: COMMERCIAL

## 2023-11-21 LAB
ALBUMIN SERPL-MCNC: 4.4 G/DL (ref 3.5–5.2)
ALBUMIN/GLOB SERPL: 1.4 {RATIO} (ref 1–2.5)
ALP SERPL-CCNC: 80 U/L (ref 35–104)
ALT SERPL-CCNC: 10 U/L (ref 5–33)
ANION GAP SERPL CALCULATED.3IONS-SCNC: 13 MMOL/L (ref 9–17)
AST SERPL-CCNC: 12 U/L
BASOPHILS # BLD: 0.05 K/UL (ref 0–0.2)
BASOPHILS NFR BLD: 0 % (ref 0–2)
BILIRUB SERPL-MCNC: 0.5 MG/DL (ref 0.3–1.2)
BUN SERPL-MCNC: 5 MG/DL (ref 6–20)
CALCIUM SERPL-MCNC: 9.4 MG/DL (ref 8.6–10.4)
CHLORIDE SERPL-SCNC: 100 MMOL/L (ref 98–107)
CO2 SERPL-SCNC: 25 MMOL/L (ref 20–31)
CREAT SERPL-MCNC: 0.6 MG/DL (ref 0.5–0.9)
EOSINOPHIL # BLD: 0.09 K/UL (ref 0–0.44)
EOSINOPHILS RELATIVE PERCENT: 1 % (ref 1–4)
ERYTHROCYTE [DISTWIDTH] IN BLOOD BY AUTOMATED COUNT: 12.4 % (ref 11.8–14.4)
GFR SERPL CREATININE-BSD FRML MDRD: >60 ML/MIN/1.73M2
GLUCOSE SERPL-MCNC: 85 MG/DL (ref 70–99)
HCT VFR BLD AUTO: 44.6 % (ref 36.3–47.1)
HGB BLD-MCNC: 14.5 G/DL (ref 11.9–15.1)
IMM GRANULOCYTES # BLD AUTO: 0.06 K/UL (ref 0–0.3)
IMM GRANULOCYTES NFR BLD: 1 %
LYMPHOCYTES NFR BLD: 2.01 K/UL (ref 1.1–3.7)
LYMPHOCYTES RELATIVE PERCENT: 18 % (ref 24–43)
MCH RBC QN AUTO: 27.4 PG (ref 25.2–33.5)
MCHC RBC AUTO-ENTMCNC: 32.5 G/DL (ref 28.4–34.8)
MCV RBC AUTO: 84.2 FL (ref 82.6–102.9)
MONOCYTES NFR BLD: 0.62 K/UL (ref 0.1–1.2)
MONOCYTES NFR BLD: 6 % (ref 3–12)
NEUTROPHILS NFR BLD: 74 % (ref 36–65)
NEUTS SEG NFR BLD: 8.47 K/UL (ref 1.5–8.1)
NRBC BLD-RTO: 0 PER 100 WBC
PLATELET # BLD AUTO: 317 K/UL (ref 138–453)
PMV BLD AUTO: 9.6 FL (ref 8.1–13.5)
POTASSIUM SERPL-SCNC: 4 MMOL/L (ref 3.7–5.3)
PROT SERPL-MCNC: 7.6 G/DL (ref 6.4–8.3)
RBC # BLD AUTO: 5.3 M/UL (ref 3.95–5.11)
SODIUM SERPL-SCNC: 138 MMOL/L (ref 135–144)
WBC OTHER # BLD: 11.3 K/UL (ref 3.5–11.3)

## 2023-11-21 PROCEDURE — 36415 COLL VENOUS BLD VENIPUNCTURE: CPT

## 2023-11-21 PROCEDURE — 85025 COMPLETE CBC W/AUTO DIFF WBC: CPT

## 2023-11-21 PROCEDURE — 86480 TB TEST CELL IMMUN MEASURE: CPT

## 2023-11-21 PROCEDURE — 80053 COMPREHEN METABOLIC PANEL: CPT

## 2023-11-24 LAB
QUANTI TB GOLD PLUS: NEGATIVE
QUANTI TB1 MINUS NIL: 0 IU/ML (ref 0–0.34)
QUANTI TB2 MINUS NIL: 0.01 IU/ML (ref 0–0.34)
QUANTIFERON MITOGEN: >10 IU/ML
QUANTIFERON NIL: 0.02 IU/ML

## 2023-12-25 ENCOUNTER — HOSPITAL ENCOUNTER (EMERGENCY)
Age: 23
Discharge: HOME | End: 2023-12-25
Attending: STUDENT IN AN ORGANIZED HEALTH CARE EDUCATION/TRAINING PROGRAM
Payer: COMMERCIAL

## 2023-12-25 ENCOUNTER — APPOINTMENT (OUTPATIENT)
Dept: ULTRASOUND IMAGING | Age: 23
End: 2023-12-25
Payer: COMMERCIAL

## 2023-12-25 VITALS
RESPIRATION RATE: 17 BRPM | SYSTOLIC BLOOD PRESSURE: 143 MMHG | HEART RATE: 104 BPM | DIASTOLIC BLOOD PRESSURE: 101 MMHG | TEMPERATURE: 98.1 F | OXYGEN SATURATION: 99 %

## 2023-12-25 DIAGNOSIS — O46.90 VAGINAL BLEEDING DURING PREGNANCY: Primary | ICD-10-CM

## 2023-12-25 LAB
ABO + RH BLD: NORMAL
AMORPH SED URNS QL MICRO: ABNORMAL
ANION GAP SERPL CALCULATED.3IONS-SCNC: 12 MMOL/L (ref 9–17)
B-HCG SERPL EIA 3RD IS-ACNC: ABNORMAL MIU/ML
BASOPHILS # BLD: 0.1 K/UL (ref 0–0.2)
BASOPHILS NFR BLD: 1 % (ref 0–2)
BILIRUB UR QL STRIP: NEGATIVE
BUN SERPL-MCNC: 9 MG/DL (ref 6–20)
BUN/CREAT SERPL: 15 (ref 9–20)
CALCIUM SERPL-MCNC: 9.4 MG/DL (ref 8.6–10.4)
CHLORIDE SERPL-SCNC: 100 MMOL/L (ref 98–107)
CLARITY UR: CLEAR
CO2 SERPL-SCNC: 24 MMOL/L (ref 20–31)
COLOR UR: YELLOW
CREAT SERPL-MCNC: 0.6 MG/DL (ref 0.5–0.9)
EOSINOPHIL # BLD: 0.18 K/UL (ref 0–0.44)
EOSINOPHILS RELATIVE PERCENT: 1 % (ref 1–4)
EPI CELLS #/AREA URNS HPF: ABNORMAL /HPF (ref 0–5)
ERYTHROCYTE [DISTWIDTH] IN BLOOD BY AUTOMATED COUNT: 13.1 % (ref 11.8–14.4)
GFR SERPL CREATININE-BSD FRML MDRD: >60 ML/MIN/1.73M2
GLUCOSE SERPL-MCNC: 96 MG/DL (ref 70–99)
GLUCOSE UR STRIP-MCNC: NEGATIVE MG/DL
HCG UR QL: POSITIVE
HCT VFR BLD AUTO: 43.4 % (ref 36.3–47.1)
HGB BLD-MCNC: 13.9 G/DL (ref 11.9–15.1)
HGB UR QL STRIP.AUTO: NEGATIVE
IMM GRANULOCYTES # BLD AUTO: 0.13 K/UL (ref 0–0.3)
IMM GRANULOCYTES NFR BLD: 1 %
KETONES UR STRIP-MCNC: NEGATIVE MG/DL
LEUKOCYTE ESTERASE UR QL STRIP: NEGATIVE
LYMPHOCYTES NFR BLD: 3.16 K/UL (ref 1.1–3.7)
LYMPHOCYTES RELATIVE PERCENT: 22 % (ref 24–43)
MCH RBC QN AUTO: 27.3 PG (ref 25.2–33.5)
MCHC RBC AUTO-ENTMCNC: 32 G/DL (ref 28.4–34.8)
MCV RBC AUTO: 85.3 FL (ref 82.6–102.9)
MONOCYTES NFR BLD: 0.75 K/UL (ref 0.1–1.2)
MONOCYTES NFR BLD: 5 % (ref 3–12)
MUCOUS THREADS URNS QL MICRO: ABNORMAL
NEUTROPHILS NFR BLD: 70 % (ref 36–65)
NEUTS SEG NFR BLD: 9.83 K/UL (ref 1.5–8.1)
NITRITE UR QL STRIP: NEGATIVE
NRBC BLD-RTO: 0 PER 100 WBC
PH UR STRIP: 6 [PH] (ref 5–8)
PLATELET # BLD AUTO: 364 K/UL (ref 138–453)
PMV BLD AUTO: 9.5 FL (ref 8.1–13.5)
POTASSIUM SERPL-SCNC: 3.8 MMOL/L (ref 3.7–5.3)
PROT UR STRIP-MCNC: NEGATIVE MG/DL
RBC # BLD AUTO: 5.09 M/UL (ref 3.95–5.11)
RBC #/AREA URNS HPF: ABNORMAL /HPF (ref 0–2)
SODIUM SERPL-SCNC: 136 MMOL/L (ref 135–144)
SP GR UR STRIP: 1.02 (ref 1–1.03)
UROBILINOGEN UR STRIP-ACNC: NORMAL EU/DL (ref 0–1)
WBC #/AREA URNS HPF: ABNORMAL /HPF (ref 0–5)
WBC OTHER # BLD: 14.2 K/UL (ref 3.5–11.3)

## 2023-12-25 PROCEDURE — 86403 PARTICLE AGGLUT ANTBDY SCRN: CPT

## 2023-12-25 PROCEDURE — 84702 CHORIONIC GONADOTROPIN TEST: CPT

## 2023-12-25 PROCEDURE — 99284 EMERGENCY DEPT VISIT MOD MDM: CPT

## 2023-12-25 PROCEDURE — 87086 URINE CULTURE/COLONY COUNT: CPT

## 2023-12-25 PROCEDURE — 81001 URINALYSIS AUTO W/SCOPE: CPT

## 2023-12-25 PROCEDURE — 76801 OB US < 14 WKS SINGLE FETUS: CPT

## 2023-12-25 PROCEDURE — 81025 URINE PREGNANCY TEST: CPT

## 2023-12-25 PROCEDURE — 86900 BLOOD TYPING SEROLOGIC ABO: CPT

## 2023-12-25 PROCEDURE — 80048 BASIC METABOLIC PNL TOTAL CA: CPT

## 2023-12-25 PROCEDURE — 86901 BLOOD TYPING SEROLOGIC RH(D): CPT

## 2023-12-25 PROCEDURE — 76817 TRANSVAGINAL US OBSTETRIC: CPT

## 2023-12-25 PROCEDURE — 85025 COMPLETE CBC W/AUTO DIFF WBC: CPT

## 2023-12-25 NOTE — DISCHARGE INSTRUCTIONS
PLEASE RETURN TO THE EMERGENCY DEPARTMENT IMMEDIATELY for worsening symptoms, severe vaginal bleeding, using more than (4) pads per hour, feeling of weakness, light-headed / dizzy, or if you develop any concerning symptoms such as: high fever not relieved by acetaminophen (Tylenol) chills, shortness of breath, chest pain, feeling of your heart fluttering or racing, persistent nausea and/or vomiting, vomiting up blood, blood in your stool, loss of consciousness, numbness, weakness or tingling in the arms or legs or change in color of the extremities, changes in mental status, persistent headache, blurry vision loss of bladder / bowel control. Return within 8 - 12 hours if you have any of the following: worsening of pain in your abdomen, you continue to vomit, pain goes to your back, have pain in the abdomen when going over a bump in the car or when you jump up and down, unable to follow up with your physician, or other any other care or concern.

## 2023-12-27 LAB
MICROORGANISM SPEC CULT: ABNORMAL
MICROORGANISM SPEC CULT: ABNORMAL
SPECIMEN DESCRIPTION: ABNORMAL

## 2023-12-28 NOTE — PROGRESS NOTES
"Chief Complaint   Patient presents with    Back Pain     Mid back started 2/16    Chest Pain     In the middle of the sternum, started 2/17       Pt ambulated to triage. Pt A&Ox4, came in for above complaint.     Pt to lobby . Pt educated on alerting staff in changes to condition. Pt verbalized understanding. Protocol ordered. EKG completed prior to triage process.    BP (!) 167/96   Pulse 88   Temp 36.1 °C (97 °F) (Temporal)   Resp 18   Ht 1.727 m (5' 8\")   Wt 123 kg (270 lb 8.1 oz)   SpO2 97%   BMI 41.13 kg/m²     " Culture Result Review (12/28/23): Patient has positive urine culture for Group B strep (<10,000 cfu/ml) with positive pregnancy test.   Discharged from ER on no antibiotic therapy. Recommendation:  No antibiotic therapy needed at this time .    -Called patient's Ob/gyn office [facility]- Phone # [phone]) and spoke with MEIR Varghese. Confirmed office Fax # ([phone]) and faxed results to their office.     -Discussed with Dr. Benigno Jade.  No additional follow-up for urine culture result needed at this time.     -Sara Guzman, PharmD, BCPS

## 2024-01-01 NOTE — ED PROVIDER NOTES
[facility] EMERGENCY DEPARTMENT ENCOUNTER      Pt Name: Celia Dinero  MRN: [id number]  Birthdate 2000  Date of evaluation: 1/1/24    CHIEF COMPLAINT       Chief Complaint   Patient presents with    Abdominal Pain     Reports 'hard lump' on LLQ; reports + pregnancy test today       HISTORY OF PRESENT ILLNESS   Celia Dinero is a 23 y.o. female who presents with left lower quadrant cramping spotting.  Recently positive pregnancy test.  Has not had any OB visit yet.  G1, P0.    PASTMEDICAL HISTORY     Past Medical History:   Diagnosis Date    Psoriasis      Past Problem List  Patient Active Problem List   Diagnosis Code    Anxiety F41.9    Menorrhagia with irregular cycle N92.1    Plaque psoriasis L40.0    PTSD (post-traumatic stress disorder) F43.10    Chronic major depressive disorder, recurrent episode (HCC) F33.9    Attention deficit hyperactivity disorder (ADHD), combined type F90.2    Bacterial conjunctivitis of both eyes H10.9, B96.89       SURGICAL HISTORY     No past surgical history on file.    CURRENT MEDICATIONS       Discharge Medication List as of 12/25/2023  7:21 AM        CONTINUE these medications which have NOT CHANGED    Details   FLUoxetine (PROZAC) 10 MG capsule Take 1 capsule by mouth daily For 7 days, then increase to 2 capsules there after., Disp-60 capsule, R-0Normal      TREMFYA 100 MG/ML SOSY injection DAWHistorical Med             ALLERGIES     is allergic to amoxicillin, bactrim [sulfamethoxazole-trimethoprim], otezla [apremilast], and penicillins.    FAMILY HISTORY     has no family status information on file.        SOCIAL HISTORY       Social History     Tobacco Use    Smoking status: Former     Current packs/day: 0.00     Types: Cigarettes     Quit date: 5/1/2020     Years since quitting: 3.6    Smokeless tobacco: Never   Vaping Use    Vaping Use: Every day    Substances: Nicotine   Substance Use Topics    Drug use: Never       PHYSICAL EXAM     INITIAL VITALS: BP (!) 143/101

## 2024-05-29 ENCOUNTER — TELEPHONE (OUTPATIENT)
Dept: OBGYN CLINIC | Age: 24
End: 2024-05-29

## 2024-05-29 NOTE — TELEPHONE ENCOUNTER
Left Message per Jovita for patient to call back and schedule appt in office ASAP so we can order a US. Per [facility] they scanned this patient and she states it looks like the start of a Molar Pregnancy.Patient has NP appt 6/3/24 but Nithya would like her to come in ASAP.

## 2024-05-30 ENCOUNTER — HOSPITAL ENCOUNTER (OUTPATIENT)
Age: 24
Setting detail: SPECIMEN
Discharge: HOME | End: 2024-05-30

## 2024-05-30 ENCOUNTER — OFFICE VISIT (OUTPATIENT)
Dept: OBGYN CLINIC | Age: 24
End: 2024-05-30
Payer: COMMERCIAL

## 2024-05-30 ENCOUNTER — TELEPHONE (OUTPATIENT)
Dept: OBGYN CLINIC | Age: 24
End: 2024-05-30

## 2024-05-30 VITALS
WEIGHT: 131 LBS | DIASTOLIC BLOOD PRESSURE: 80 MMHG | HEIGHT: 64 IN | SYSTOLIC BLOOD PRESSURE: 128 MMHG | BODY MASS INDEX: 22.36 KG/M2

## 2024-05-30 DIAGNOSIS — N92.6 MISSED MENSES: ICD-10-CM

## 2024-05-30 DIAGNOSIS — N92.6 MISSED MENSES: Primary | ICD-10-CM

## 2024-05-30 DIAGNOSIS — Z34.90 EARLY STAGE OF PREGNANCY: Primary | ICD-10-CM

## 2024-05-30 LAB — B-HCG SERPL EIA 3RD IS-ACNC: 1105 MIU/ML (ref 0–7)

## 2024-05-30 PROCEDURE — G8427 DOCREV CUR MEDS BY ELIG CLIN: HCPCS | Performed by: NURSE PRACTITIONER

## 2024-05-30 PROCEDURE — 36415 COLL VENOUS BLD VENIPUNCTURE: CPT | Performed by: NURSE PRACTITIONER

## 2024-05-30 PROCEDURE — 1036F TOBACCO NON-USER: CPT | Performed by: NURSE PRACTITIONER

## 2024-05-30 PROCEDURE — G8420 CALC BMI NORM PARAMETERS: HCPCS | Performed by: NURSE PRACTITIONER

## 2024-05-30 PROCEDURE — 99203 OFFICE O/P NEW LOW 30 MIN: CPT | Performed by: NURSE PRACTITIONER

## 2024-05-30 RX ORDER — ASCORBIC ACID, CHOLECALCIFEROL, .ALPHA.-TOCOPHEROL ACETATE, DL-, PYRIDOXINE, FOLIC ACID, CYANOCOBALAMIN, CALCIUM, FERROUS FUMARATE, MAGNESIUM, DOCONEXENT 85; 200; 10; 25; 1; 12; 140; 27; 45; 300 [IU]/1; [IU]/1; [IU]/1; [IU]/1; MG/1; UG/1; MG/1; MG/1; MG/1; MG/1
1 CAPSULE, GELATIN COATED ORAL DAILY
Qty: 30 CAPSULE | Refills: 11 | Status: SHIPPED | OUTPATIENT
Start: 2024-05-30 | End: 2025-05-30

## 2024-05-30 ASSESSMENT — PATIENT HEALTH QUESTIONNAIRE - PHQ9
SUM OF ALL RESPONSES TO PHQ QUESTIONS 1-9: 2
SUM OF ALL RESPONSES TO PHQ9 QUESTIONS 1 & 2: 2
1. LITTLE INTEREST OR PLEASURE IN DOING THINGS: SEVERAL DAYS
2. FEELING DOWN, DEPRESSED OR HOPELESS: SEVERAL DAYS
SUM OF ALL RESPONSES TO PHQ QUESTIONS 1-9: 2

## 2024-05-30 NOTE — TELEPHONE ENCOUNTER
----- Message from BRIANA Mccartney - NP sent at 5/30/2024  4:30 PM EDT -----  + quant  Keep appt for u/a tomorrow  [facility] was concerned for molar pregnancy when seen in office yesterday   Repeat quant in 48 hours   PNVs sent

## 2024-05-30 NOTE — TELEPHONE ENCOUNTER
Per Jovita pt notified of results and recommendations. Order in EPIC for quant.     + quant  Keep appt for u/a tomorrow  [facility] me was concerned for molar pregnancy when seen in office yesterday   Repeat quant in 48 hours   PNVs sent

## 2024-05-31 ENCOUNTER — HOSPITAL ENCOUNTER (OUTPATIENT)
Dept: ULTRASOUND IMAGING | Age: 24
Discharge: HOME | End: 2024-05-31
Payer: COMMERCIAL

## 2024-05-31 ENCOUNTER — APPOINTMENT (OUTPATIENT)
Dept: ULTRASOUND IMAGING | Age: 24
End: 2024-05-31
Payer: COMMERCIAL

## 2024-05-31 DIAGNOSIS — N92.6 MISSED MENSES: ICD-10-CM

## 2024-05-31 PROCEDURE — 76801 OB US < 14 WKS SINGLE FETUS: CPT

## 2024-05-31 PROCEDURE — 76817 TRANSVAGINAL US OBSTETRIC: CPT

## 2024-06-01 ENCOUNTER — HOSPITAL ENCOUNTER (OUTPATIENT)
Age: 24
Discharge: HOME | End: 2024-06-01
Payer: COMMERCIAL

## 2024-06-01 DIAGNOSIS — Z34.90 EARLY STAGE OF PREGNANCY: ICD-10-CM

## 2024-06-01 LAB — B-HCG SERPL EIA 3RD IS-ACNC: 2043 MIU/ML (ref 0–7)

## 2024-06-01 PROCEDURE — 36415 COLL VENOUS BLD VENIPUNCTURE: CPT

## 2024-06-01 PROCEDURE — 84702 CHORIONIC GONADOTROPIN TEST: CPT

## 2024-06-03 ENCOUNTER — HOSPITAL ENCOUNTER (OUTPATIENT)
Age: 24
Discharge: HOME | End: 2024-06-03
Payer: COMMERCIAL

## 2024-06-03 ENCOUNTER — TELEPHONE (OUTPATIENT)
Dept: OBGYN CLINIC | Age: 24
End: 2024-06-03

## 2024-06-03 DIAGNOSIS — Z34.90 EARLY STAGE OF PREGNANCY: ICD-10-CM

## 2024-06-03 DIAGNOSIS — Z34.90 EARLY STAGE OF PREGNANCY: Primary | ICD-10-CM

## 2024-06-03 LAB — B-HCG SERPL EIA 3RD IS-ACNC: 3914 MIU/ML (ref 0–7)

## 2024-06-03 PROCEDURE — 84702 CHORIONIC GONADOTROPIN TEST: CPT

## 2024-06-03 SDOH — HEALTH STABILITY: PHYSICAL HEALTH: ON AVERAGE, HOW MANY MINUTES DO YOU ENGAGE IN EXERCISE AT THIS LEVEL?: 30 MIN

## 2024-06-03 SDOH — HEALTH STABILITY: PHYSICAL HEALTH: ON AVERAGE, HOW MANY DAYS PER WEEK DO YOU ENGAGE IN MODERATE TO STRENUOUS EXERCISE (LIKE A BRISK WALK)?: 1 DAY

## 2024-06-03 NOTE — TELEPHONE ENCOUNTER
----- Message from BRIANA Self CNP sent at 6/3/2024  8:11 AM EDT -----  Increasing quant  Please schedule for new OB intake/ ultrasound  Prenatal vitamin 1 PO QD with 12 refills.

## 2024-06-03 NOTE — TELEPHONE ENCOUNTER
Per Jovita pt notified of pelvic US results/quant results and pt to repeat quant in 48hr (to be completed today)and repeat pelvic US around 6/14/24.  Pt voiced understanding to all results and recommendations given. Orders in epic.

## 2024-06-06 ENCOUNTER — OFFICE VISIT (OUTPATIENT)
Dept: FAMILY MEDICINE CLINIC | Age: 24
End: 2024-06-06
Payer: COMMERCIAL

## 2024-06-06 VITALS
SYSTOLIC BLOOD PRESSURE: 118 MMHG | WEIGHT: 132 LBS | OXYGEN SATURATION: 98 % | HEART RATE: 71 BPM | HEIGHT: 64 IN | DIASTOLIC BLOOD PRESSURE: 70 MMHG | BODY MASS INDEX: 22.53 KG/M2

## 2024-06-06 DIAGNOSIS — F33.9 CHRONIC MAJOR DEPRESSIVE DISORDER, RECURRENT EPISODE (HCC): ICD-10-CM

## 2024-06-06 DIAGNOSIS — R63.4 WEIGHT LOSS: Primary | ICD-10-CM

## 2024-06-06 DIAGNOSIS — G47.00 INSOMNIA, UNSPECIFIED TYPE: ICD-10-CM

## 2024-06-06 DIAGNOSIS — Z11.8 SCREENING FOR CHLAMYDIAL DISEASE: ICD-10-CM

## 2024-06-06 DIAGNOSIS — F43.10 PTSD (POST-TRAUMATIC STRESS DISORDER): ICD-10-CM

## 2024-06-06 DIAGNOSIS — Z11.59 ENCOUNTER FOR SCREENING FOR OTHER VIRAL DISEASES: ICD-10-CM

## 2024-06-06 DIAGNOSIS — G57.61 MORTON'S NEUROMA OF RIGHT FOOT: ICD-10-CM

## 2024-06-06 DIAGNOSIS — F90.2 ATTENTION DEFICIT HYPERACTIVITY DISORDER (ADHD), COMBINED TYPE: ICD-10-CM

## 2024-06-06 DIAGNOSIS — L81.9 DISCOLORATION OF SKIN OF FOOT: ICD-10-CM

## 2024-06-06 PROBLEM — H10.9 BACTERIAL CONJUNCTIVITIS OF BOTH EYES: Status: RESOLVED | Noted: 2023-02-13 | Resolved: 2024-06-06

## 2024-06-06 PROBLEM — B96.89 BACTERIAL CONJUNCTIVITIS OF BOTH EYES: Status: RESOLVED | Noted: 2023-02-13 | Resolved: 2024-06-06

## 2024-06-06 PROCEDURE — G8427 DOCREV CUR MEDS BY ELIG CLIN: HCPCS | Performed by: FAMILY MEDICINE

## 2024-06-06 PROCEDURE — 1036F TOBACCO NON-USER: CPT | Performed by: FAMILY MEDICINE

## 2024-06-06 PROCEDURE — 99204 OFFICE O/P NEW MOD 45 MIN: CPT | Performed by: FAMILY MEDICINE

## 2024-06-06 PROCEDURE — G8420 CALC BMI NORM PARAMETERS: HCPCS | Performed by: FAMILY MEDICINE

## 2024-06-06 SDOH — ECONOMIC STABILITY: INCOME INSECURITY: HOW HARD IS IT FOR YOU TO PAY FOR THE VERY BASICS LIKE FOOD, HOUSING, MEDICAL CARE, AND HEATING?: NOT HARD AT ALL

## 2024-06-06 SDOH — ECONOMIC STABILITY: HOUSING INSECURITY
IN THE LAST 12 MONTHS, WAS THERE A TIME WHEN YOU DID NOT HAVE A STEADY PLACE TO SLEEP OR SLEPT IN A SHELTER (INCLUDING NOW)?: NO

## 2024-06-06 SDOH — ECONOMIC STABILITY: FOOD INSECURITY: WITHIN THE PAST 12 MONTHS, YOU WORRIED THAT YOUR FOOD WOULD RUN OUT BEFORE YOU GOT MONEY TO BUY MORE.: NEVER TRUE

## 2024-06-06 SDOH — ECONOMIC STABILITY: FOOD INSECURITY: WITHIN THE PAST 12 MONTHS, THE FOOD YOU BOUGHT JUST DIDN'T LAST AND YOU DIDN'T HAVE MONEY TO GET MORE.: NEVER TRUE

## 2024-06-06 ASSESSMENT — ENCOUNTER SYMPTOMS
TROUBLE SWALLOWING: 0
STRIDOR: 0
BACK PAIN: 0
RHINORRHEA: 0
CONSTIPATION: 0
COUGH: 0
ABDOMINAL DISTENTION: 0
RECTAL PAIN: 0
ABDOMINAL PAIN: 0
CHEST TIGHTNESS: 0
SINUS PRESSURE: 0
EYE REDNESS: 0
SORE THROAT: 0
VOMITING: 0
COLOR CHANGE: 1
NAUSEA: 0
BLOOD IN STOOL: 0
SHORTNESS OF BREATH: 0
DIARRHEA: 0
WHEEZING: 0

## 2024-06-06 ASSESSMENT — PATIENT HEALTH QUESTIONNAIRE - PHQ9
9. THOUGHTS THAT YOU WOULD BE BETTER OFF DEAD, OR OF HURTING YOURSELF: NOT AT ALL
4. FEELING TIRED OR HAVING LITTLE ENERGY: MORE THAN HALF THE DAYS
5. POOR APPETITE OR OVEREATING: MORE THAN HALF THE DAYS
SUM OF ALL RESPONSES TO PHQ9 QUESTIONS 1 & 2: 2
SUM OF ALL RESPONSES TO PHQ QUESTIONS 1-9: 16
6. FEELING BAD ABOUT YOURSELF - OR THAT YOU ARE A FAILURE OR HAVE LET YOURSELF OR YOUR FAMILY DOWN: SEVERAL DAYS
3. TROUBLE FALLING OR STAYING ASLEEP: NEARLY EVERY DAY
10. IF YOU CHECKED OFF ANY PROBLEMS, HOW DIFFICULT HAVE THESE PROBLEMS MADE IT FOR YOU TO DO YOUR WORK, TAKE CARE OF THINGS AT HOME, OR GET ALONG WITH OTHER PEOPLE: NOT DIFFICULT AT ALL
8. MOVING OR SPEAKING SO SLOWLY THAT OTHER PEOPLE COULD HAVE NOTICED. OR THE OPPOSITE, BEING SO FIGETY OR RESTLESS THAT YOU HAVE BEEN MOVING AROUND A LOT MORE THAN USUAL: NEARLY EVERY DAY
7. TROUBLE CONCENTRATING ON THINGS, SUCH AS READING THE NEWSPAPER OR WATCHING TELEVISION: NEARLY EVERY DAY
1. LITTLE INTEREST OR PLEASURE IN DOING THINGS: SEVERAL DAYS
SUM OF ALL RESPONSES TO PHQ QUESTIONS 1-9: 16
2. FEELING DOWN, DEPRESSED OR HOPELESS: SEVERAL DAYS
SUM OF ALL RESPONSES TO PHQ QUESTIONS 1-9: 16
SUM OF ALL RESPONSES TO PHQ QUESTIONS 1-9: 16

## 2024-06-06 NOTE — PROGRESS NOTES
Visit Information    Have you changed or started any medications since your last visit including any over-the-counter medicines, vitamins, or herbal medicines? no   Are you having any side effects from any of your medications? -  no  Have you stopped taking any of your medications? Is so, why? -  no    Have you seen any other physician or provider since your last visit? No  Have you had any other diagnostic tests since your last visit? No  Have you been seen in the emergency room and/or had an admission to a hospital since we last saw you? No  Have you had your routine dental cleaning in the past 6 months? no    Have you activated your [employer] account? If not, what are your barriers? Yes     Patient Care Team:  Agnes Willams MD as PCP - General (Family Medicine)  Mahogany Jones APRN - CNP as PCP - Empaneled Provider    Medical History Review  Past Medical, Family, and Social History reviewed and contribute to the patient presenting condition    Health Maintenance   Topic Date Due    Hepatitis B vaccine (1 of 3 - 3-dose series) Never done    Varicella vaccine (1 of 2 - 2-dose childhood series) Never done    HPV vaccine (1 - 2-dose series) Never done    HIV screen  Never done    Chlamydia/GC screen  Never done    Hepatitis C screen  Never done    Pap smear  Never done    COVID-19 Vaccine (3 - 2023-24 season) 09/01/2023    Depression Monitoring  05/30/2025    DTaP/Tdap/Td vaccine (2 - Td or Tdap) 05/09/2032    Respiratory Syncytial Virus (RSV) Pregnant or age 60 yrs+ (1 - 1-dose 60+ series) 04/11/2060    Meningococcal (ACWY) vaccine  Completed    Flu vaccine  Completed    Hepatitis A vaccine  Aged Out    Hib vaccine  Aged Out    Polio vaccine  Aged Out    Pneumococcal 0-64 years Vaccine  Aged Out    Depression Screen  Discontinued     
benefit, and side effects of prescribed medications. Barriers to medication compliance addressed.   Patient given educational materials - see patient instructions  Was a self-tracking handout given in paper form or via [employer]? Yes    Requested Prescriptions      No prescriptions requested or ordered in this encounter       All patient questions answered.  Patient voiced understanding.    Quality Measures    Body mass index is 22.65 kg/m². Normal. Weight control planned discussed Healthy diet and regular exercise.    BP: 118/70 Blood pressure is normal. Treatment plan consists of No treatment change needed.    No results found for: \"LDLDIRECT\" (goal LDL reduction with dx if diabetes is 50% LDL reduction)          6/6/2024    11:07 AM 5/30/2024    10:06 AM 4/7/2022     4:12 PM   PHQ Scores   PHQ2 Score 2 2 5   PHQ9 Score 16 2 20     Interpretation of Total Score Depression Severity: 1-4 = Minimal depression, 5-9 = Mild depression, 10-14 = Moderate depression, 15-19 = Moderately severe depression, 20-27 = Severe depression    The patient'spast medical, surgical, social, and family history as well as her   current medications and allergies were reviewed as documented in today's encounter.      Medications, labs, diagnostic studies, consultations andfollow-up as documented in this encounter.    Return in about 3 months (around 9/6/2024) for lab work.    Patient wasseen with total face to face time of 45 minutes. More than 50% of this visit was counseling and education.       Future Appointments   Date Time Provider Department Center   6/17/2024  8:00 AM NENA STEVENS OB/GYN [facility]   9/6/2024  9:30 [facility]     This note was completed by using the assistance of a speech-recognition program. However, inadvertent computerized transcription errors may be present. Althoughevery effort was made to ensure accuracy, no guarantees can be provided that every mistake has been identified

## 2024-06-06 NOTE — PATIENT INSTRUCTIONS
Dear valued patient,    We hope this message finds you in good health. At [ ], we are committed to providing you with the best possible healthcare experience. To further enhance your convenience and streamline your access to our services, we would like to introduce you to the benefits of utilizing direct scheduling through the [employer] Patient Portal.    Direct scheduling is a feature within the [employer] Patient Portal that allows you to schedule appointments with your healthcare provider directly, without the need to make a phone call or wait for a callback. We understand that your time is becky, and we want to ensure that you have quick and easy access to our services whenever you need them.  Here are some reasons why you should consider utilizing direct scheduling through the [employer] Patient Portal:    1. Convenience: With direct scheduling, you can book appointments at any time that suits you best, 24 hours a day, 7 days a week. No more waiting on hold or playing phone tag with our office staff. It puts you in control of managing your healthcare appointments effortlessly.    2. Accessibility: The [employer] Patient Portal is accessible through your computer, smartphone, or tablet, allowing you to schedule appointments from the comfort of your home, office, or on the go. You can access your medical records, review test results, and communicate with your healthcare provider all in one secure and user-friendly platform.    3. Timesaving: By utilizing direct scheduling, you can avoid unnecessary delays and save becky time. You can easily browse the available appointment slots and select the one that works best for you, eliminating the back-and-forth communication typically required when scheduling via phone.    4. Reminders and notifications: [employer] Patient Portal sends automatic reminders for upcoming appointments, ensuring that you never miss an important visit. You can also receive notifications about test

## 2024-06-17 ENCOUNTER — HOSPITAL ENCOUNTER (OUTPATIENT)
Age: 24
Discharge: HOME | End: 2024-06-17
Payer: COMMERCIAL

## 2024-06-17 DIAGNOSIS — Z11.59 ENCOUNTER FOR SCREENING FOR OTHER VIRAL DISEASES: ICD-10-CM

## 2024-06-17 DIAGNOSIS — Z11.8 SCREENING FOR CHLAMYDIAL DISEASE: ICD-10-CM

## 2024-06-17 DIAGNOSIS — R63.4 WEIGHT LOSS: ICD-10-CM

## 2024-06-17 LAB
25(OH)D3 SERPL-MCNC: 33.2 NG/ML (ref 30–100)
ALBUMIN SERPL-MCNC: 4.9 G/DL (ref 3.5–5.2)
ALBUMIN/GLOB SERPL: 2 {RATIO} (ref 1–2.5)
ALP SERPL-CCNC: 64 U/L (ref 35–104)
ALT SERPL-CCNC: 10 U/L (ref 10–35)
ANION GAP SERPL CALCULATED.3IONS-SCNC: 13 MMOL/L (ref 9–16)
AST SERPL-CCNC: 17 U/L (ref 10–35)
BASOPHILS # BLD: 0.04 K/UL (ref 0–0.2)
BASOPHILS NFR BLD: 0 % (ref 0–2)
BILIRUB SERPL-MCNC: 0.3 MG/DL (ref 0–1.2)
BUN SERPL-MCNC: 8 MG/DL (ref 6–20)
CALCIUM SERPL-MCNC: 9.6 MG/DL (ref 8.6–10.4)
CHLORIDE SERPL-SCNC: 103 MMOL/L (ref 98–107)
CO2 SERPL-SCNC: 23 MMOL/L (ref 20–31)
CREAT SERPL-MCNC: 0.6 MG/DL (ref 0.5–0.9)
EOSINOPHIL # BLD: 0.06 K/UL (ref 0–0.44)
EOSINOPHILS RELATIVE PERCENT: 1 % (ref 1–4)
ERYTHROCYTE [DISTWIDTH] IN BLOOD BY AUTOMATED COUNT: 13.2 % (ref 11.8–14.4)
FOLATE SERPL-MCNC: 10.8 NG/ML (ref 4.8–24.2)
GFR, ESTIMATED: >90 ML/MIN/1.73M2
GLUCOSE SERPL-MCNC: 92 MG/DL (ref 74–99)
HBV SURFACE AB SERPL IA-ACNC: 8.36 MIU/ML
HCT VFR BLD AUTO: 42.6 % (ref 36.3–47.1)
HCV AB SERPL QL IA: NONREACTIVE
HGB BLD-MCNC: 14.2 G/DL (ref 11.9–15.1)
HIV 1+2 AB+HIV1 P24 AG SERPL QL IA: NONREACTIVE
IMM GRANULOCYTES # BLD AUTO: 0.07 K/UL (ref 0–0.3)
IMM GRANULOCYTES NFR BLD: 1 %
LYMPHOCYTES NFR BLD: 1.69 K/UL (ref 1.1–3.7)
LYMPHOCYTES RELATIVE PERCENT: 17 % (ref 24–43)
MAGNESIUM SERPL-MCNC: 2 MG/DL (ref 1.6–2.6)
MCH RBC QN AUTO: 28 PG (ref 25.2–33.5)
MCHC RBC AUTO-ENTMCNC: 33.3 G/DL (ref 28.4–34.8)
MCV RBC AUTO: 83.9 FL (ref 82.6–102.9)
MONOCYTES NFR BLD: 0.41 K/UL (ref 0.1–1.2)
MONOCYTES NFR BLD: 4 % (ref 3–12)
NEUTROPHILS NFR BLD: 77 % (ref 36–65)
NEUTS SEG NFR BLD: 7.44 K/UL (ref 1.5–8.1)
NRBC BLD-RTO: 0 PER 100 WBC
PLATELET # BLD AUTO: 259 K/UL (ref 138–453)
PMV BLD AUTO: 9.9 FL (ref 8.1–13.5)
POTASSIUM SERPL-SCNC: 3.6 MMOL/L (ref 3.7–5.3)
PROT SERPL-MCNC: 7.4 G/DL (ref 6.6–8.7)
RBC # BLD AUTO: 5.08 M/UL (ref 3.95–5.11)
SODIUM SERPL-SCNC: 139 MMOL/L (ref 136–145)
TSH SERPL DL<=0.05 MIU/L-ACNC: 0.97 UIU/ML (ref 0.27–4.2)
URATE SERPL-MCNC: 3.2 MG/DL (ref 2.4–5.7)
VIT B12 SERPL-MCNC: 533 PG/ML (ref 232–1245)
WBC OTHER # BLD: 9.7 K/UL (ref 3.5–11.3)

## 2024-06-17 PROCEDURE — 85025 COMPLETE CBC W/AUTO DIFF WBC: CPT

## 2024-06-17 PROCEDURE — 87389 HIV-1 AG W/HIV-1&-2 AB AG IA: CPT

## 2024-06-17 PROCEDURE — 86803 HEPATITIS C AB TEST: CPT

## 2024-06-17 PROCEDURE — 82746 ASSAY OF FOLIC ACID SERUM: CPT

## 2024-06-17 PROCEDURE — 36415 COLL VENOUS BLD VENIPUNCTURE: CPT

## 2024-06-17 PROCEDURE — 86787 VARICELLA-ZOSTER ANTIBODY: CPT

## 2024-06-17 PROCEDURE — 82607 VITAMIN B-12: CPT

## 2024-06-17 PROCEDURE — 87491 CHLMYD TRACH DNA AMP PROBE: CPT

## 2024-06-17 PROCEDURE — 80053 COMPREHEN METABOLIC PANEL: CPT

## 2024-06-17 PROCEDURE — 84550 ASSAY OF BLOOD/URIC ACID: CPT

## 2024-06-17 PROCEDURE — 83735 ASSAY OF MAGNESIUM: CPT

## 2024-06-17 PROCEDURE — 87591 N.GONORRHOEAE DNA AMP PROB: CPT

## 2024-06-17 PROCEDURE — 86317 IMMUNOASSAY INFECTIOUS AGENT: CPT

## 2024-06-17 PROCEDURE — 82306 VITAMIN D 25 HYDROXY: CPT

## 2024-06-17 PROCEDURE — 84443 ASSAY THYROID STIM HORMONE: CPT

## 2024-06-18 LAB
CHLAMYDIA DNA UR QL NAA+PROBE: NEGATIVE
N GONORRHOEA DNA UR QL NAA+PROBE: NEGATIVE
SPECIMEN DESCRIPTION: NORMAL

## 2024-06-19 LAB — VZV IGG SER QL IA: 1.81

## 2024-06-19 NOTE — RESULT ENCOUNTER NOTE
Please notify patient that potassium was just a little low, recommend increasing potassium rich foods.    Other labs seem to be within normal limits.      Immune to varicella.    No immunity to hepatitis B, would benefit from immunization.

## 2024-07-01 ENCOUNTER — HOSPITAL ENCOUNTER (OUTPATIENT)
Age: 24
Setting detail: SPECIMEN
Discharge: HOME | End: 2024-07-01

## 2024-07-01 ENCOUNTER — INITIAL PRENATAL (OUTPATIENT)
Dept: OBGYN CLINIC | Age: 24
End: 2024-07-01
Payer: COMMERCIAL

## 2024-07-01 VITALS
SYSTOLIC BLOOD PRESSURE: 124 MMHG | WEIGHT: 136 LBS | BODY MASS INDEX: 23.33 KG/M2 | DIASTOLIC BLOOD PRESSURE: 80 MMHG | HEART RATE: 84 BPM

## 2024-07-01 DIAGNOSIS — Z34.90 EARLY STAGE OF PREGNANCY: Primary | ICD-10-CM

## 2024-07-01 DIAGNOSIS — O09.91 SUPERVISION OF HIGH RISK PREGNANCY IN FIRST TRIMESTER: Primary | ICD-10-CM

## 2024-07-01 DIAGNOSIS — Z34.90 EARLY STAGE OF PREGNANCY: ICD-10-CM

## 2024-07-01 LAB
ABO + RH BLD: NORMAL
BASOPHILS # BLD: 0.05 K/UL (ref 0–0.2)
BASOPHILS NFR BLD: 1 % (ref 0–2)
BLOOD GROUP ANTIBODIES SERPL: NEGATIVE
EOSINOPHIL # BLD: 0.08 K/UL (ref 0–0.44)
EOSINOPHILS RELATIVE PERCENT: 1 % (ref 1–4)
ERYTHROCYTE [DISTWIDTH] IN BLOOD BY AUTOMATED COUNT: 13.4 % (ref 11.8–14.4)
GLUCOSE SERPL-MCNC: 86 MG/DL (ref 74–99)
HBV SURFACE AG SERPL QL IA: NONREACTIVE
HCT VFR BLD AUTO: 40.6 % (ref 36.3–47.1)
HCV AB SERPL QL IA: NONREACTIVE
HGB BLD-MCNC: 13.5 G/DL (ref 11.9–15.1)
HIV 1+2 AB+HIV1 P24 AG SERPL QL IA: NONREACTIVE
IMM GRANULOCYTES # BLD AUTO: 0.06 K/UL (ref 0–0.3)
IMM GRANULOCYTES NFR BLD: 1 %
LYMPHOCYTES NFR BLD: 1.97 K/UL (ref 1.1–3.7)
LYMPHOCYTES RELATIVE PERCENT: 22 % (ref 24–43)
MCH RBC QN AUTO: 28.1 PG (ref 25.2–33.5)
MCHC RBC AUTO-ENTMCNC: 33.3 G/DL (ref 28.4–34.8)
MCV RBC AUTO: 84.6 FL (ref 82.6–102.9)
MONOCYTES NFR BLD: 0.48 K/UL (ref 0.1–1.2)
MONOCYTES NFR BLD: 5 % (ref 3–12)
NEUTROPHILS NFR BLD: 70 % (ref 36–65)
NEUTS SEG NFR BLD: 6.42 K/UL (ref 1.5–8.1)
NRBC BLD-RTO: 0 PER 100 WBC
PLATELET # BLD AUTO: 249 K/UL (ref 138–453)
PMV BLD AUTO: 10.2 FL (ref 8.1–13.5)
RBC # BLD AUTO: 4.8 M/UL (ref 3.95–5.11)
RUBV IGG SERPL QL IA: 167 IU/ML
T PALLIDUM AB SER QL IA: NONREACTIVE
TSH SERPL DL<=0.05 MIU/L-ACNC: 1.78 UIU/ML (ref 0.27–4.2)
WBC OTHER # BLD: 9.1 K/UL (ref 3.5–11.3)

## 2024-07-01 PROCEDURE — 36415 COLL VENOUS BLD VENIPUNCTURE: CPT | Performed by: NURSE PRACTITIONER

## 2024-07-01 PROCEDURE — NBSRV NON-BILLABLE SERVICE: Performed by: NURSE PRACTITIONER

## 2024-07-01 NOTE — PROGRESS NOTES
Patient presents to office for OB intake, nurse visit only. Intake completed following ultrasound in office to confirm pregnancy dating/viability. Based on ultrasound, patient is currently 8w6d  gestation, Estimated Date of Delivery: 2/4/25. Patient presents to intake FOB.  Patient currently taking has a current medication list which includes the following prescription(s): pnv-dha and tremfya.. Patient's medical, surgical, obstetrical and family history reviewed. See HER for details. Patient prenatal lab work given to patient at this visit as well as OB education material. New OB consent forms signed. Patient accepted first trimester screening. Cystic Fibrosis screening declined . Referral placed to [facility] for first trimester screen. Patient scheduled for follow up OB appointment with Reina Sheriff on 7/22. Patient instructed to complete lab work prior to follow up OB appointment.

## 2024-07-01 NOTE — PROGRESS NOTES
Patient was in the office today for a prenatal labs.    Draw per physician order using sterile technique.  Drawn from the right AC.

## 2024-07-22 ENCOUNTER — HOSPITAL ENCOUNTER (OUTPATIENT)
Age: 24
Setting detail: SPECIMEN
Discharge: HOME | End: 2024-07-22

## 2024-07-22 ENCOUNTER — ROUTINE PRENATAL (OUTPATIENT)
Dept: OBGYN CLINIC | Age: 24
End: 2024-07-22

## 2024-07-22 VITALS
BODY MASS INDEX: 23.5 KG/M2 | HEART RATE: 94 BPM | SYSTOLIC BLOOD PRESSURE: 118 MMHG | DIASTOLIC BLOOD PRESSURE: 74 MMHG | WEIGHT: 137 LBS

## 2024-07-22 DIAGNOSIS — L40.0 PLAQUE PSORIASIS: ICD-10-CM

## 2024-07-22 DIAGNOSIS — Z3A.11 11 WEEKS GESTATION OF PREGNANCY: ICD-10-CM

## 2024-07-22 DIAGNOSIS — Z01.419 WELL WOMAN EXAM WITH ROUTINE GYNECOLOGICAL EXAM: ICD-10-CM

## 2024-07-22 DIAGNOSIS — Z34.90 EARLY STAGE OF PREGNANCY: Primary | ICD-10-CM

## 2024-07-22 DIAGNOSIS — Z34.90 EARLY STAGE OF PREGNANCY: ICD-10-CM

## 2024-07-22 LAB
BACTERIA URNS QL MICRO: NORMAL
BILIRUB UR QL STRIP: NEGATIVE
CASTS #/AREA URNS LPF: NORMAL /LPF (ref 0–8)
CLARITY UR: ABNORMAL
COLOR UR: YELLOW
EPI CELLS #/AREA URNS HPF: NORMAL /HPF (ref 0–5)
GLUCOSE UR STRIP-MCNC: NEGATIVE MG/DL
HGB UR QL STRIP.AUTO: NEGATIVE
KETONES UR STRIP-MCNC: NEGATIVE MG/DL
LEUKOCYTE ESTERASE UR QL STRIP: NEGATIVE
NITRITE UR QL STRIP: NEGATIVE
PH UR STRIP: 7.5 [PH] (ref 5–8)
PROT UR STRIP-MCNC: NEGATIVE MG/DL
RBC #/AREA URNS HPF: NORMAL /HPF (ref 0–4)
SP GR UR STRIP: 1.01 (ref 1–1.03)
UROBILINOGEN UR STRIP-ACNC: NORMAL EU/DL (ref 0–1)
WBC #/AREA URNS HPF: NORMAL /HPF (ref 0–5)

## 2024-07-22 PROCEDURE — G8427 DOCREV CUR MEDS BY ELIG CLIN: HCPCS | Performed by: NURSE PRACTITIONER

## 2024-07-22 PROCEDURE — 1036F TOBACCO NON-USER: CPT | Performed by: NURSE PRACTITIONER

## 2024-07-22 PROCEDURE — G8420 CALC BMI NORM PARAMETERS: HCPCS | Performed by: NURSE PRACTITIONER

## 2024-07-22 PROCEDURE — 0501F PRENATAL FLOW SHEET: CPT | Performed by: NURSE PRACTITIONER

## 2024-07-22 NOTE — PROGRESS NOTES
Celia Dinero  2024    YOB: 2000   Patient's last menstrual period was 03/15/2024.  11w6d        Primary Care Physician: Agnes Willams MD        CC: Initial Prenatal Visit    Subjective:     Celia Dinero is a 24 y.o. female     is being seen today for her first obstetrical visit.  This is a planned pregnancy. She is at 11w6d  Her obstetrical history is significant for hx of miscarriage, hx of psoriasis .   Relationship with FOB: significant other, living together. Patient does intend to breast feed. Pregnancy history fully reviewed.  Mother's ethnicity:        Objective:   Blood pressure 118/74, pulse 94, weight 62.1 kg (137 lb), last menstrual period 03/15/2024.    OB History    Para Term  AB Living   2 0 0 0 1 0   SAB IAB Ectopic Molar Multiple Live Births   0 0 0 0 0 0      # Outcome Date GA Lbr Fabio/2nd Weight Sex Delivery Anes PTL Lv   2 Current            1 AB                Past Medical History:   Diagnosis Date    ADHD (attention deficit hyperactivity disorder)     Diagnosed as child. Didn’t pursue medication.    Anxiety 2022    Depression 2022    Headache     Psoriasis     Psoriasis     Psoriatic arthritis (HCC)      Past Surgical History:   Procedure Laterality Date    WISDOM TOOTH EXTRACTION        Social History     Socioeconomic History    Marital status: Single     Spouse name: Not on file    Number of children: Not on file    Years of education: Not on file    Highest education level: Not on file   Occupational History    Not on file   Tobacco Use    Smoking status: Former     Current packs/day: 0.00     Average packs/day: 0.1 packs/day for 3.3 years (0.2 ttl pk-yrs)     Types: Cigarettes     Start date: 2019     Quit date: 3/16/2021     Years since quitting: 3.3    Smokeless tobacco: Never    Tobacco comments:     I do not smoke or vape at all anymore.   Vaping Use    Vaping Use: Former    Start date:

## 2024-07-23 ENCOUNTER — TELEPHONE (OUTPATIENT)
Dept: OBGYN CLINIC | Age: 24
End: 2024-07-23

## 2024-07-23 LAB
C TRACH DNA SPEC QL PROBE+SIG AMP: NEGATIVE
CANDIDA SPECIES: POSITIVE
GARDNERELLA VAGINALIS: POSITIVE
N GONORRHOEA DNA SPEC QL PROBE+SIG AMP: NEGATIVE
SOURCE: ABNORMAL
SPECIMEN DESCRIPTION: NORMAL
TRICHOMONAS: NEGATIVE

## 2024-07-23 NOTE — TELEPHONE ENCOUNTER
----- Message from BRIANA Self - CNP sent at 7/23/2024  3:23 PM EDT -----  +BV and candida  Flagyl 500mg PO BID x7 days after 15 weeks gestation  OTC monistat if having symptoms.  Genital culture pending

## 2024-07-23 NOTE — TELEPHONE ENCOUNTER
Tried calling patient regarding culture results mailbox full.  I will try later.     +BV and candida  Flagyl 500mg PO BID x7 days after 15 weeks gestation  OTC monistat if having symptoms.  Genital culture pending

## 2024-07-24 NOTE — RESULT ENCOUNTER NOTE
Pt viewed results and recommendations via [employer].  Pt will start Flagyl 500mg after 15 wk gest and use OTC monistat if symptomatic.

## 2024-07-25 ENCOUNTER — HOSPITAL ENCOUNTER (OUTPATIENT)
Age: 24
Discharge: HOME | End: 2024-07-25
Payer: COMMERCIAL

## 2024-07-25 ENCOUNTER — ROUTINE PRENATAL (OUTPATIENT)
Dept: PERINATAL CARE | Age: 24
End: 2024-07-25
Payer: COMMERCIAL

## 2024-07-25 VITALS
HEIGHT: 64 IN | WEIGHT: 146.16 LBS | SYSTOLIC BLOOD PRESSURE: 125 MMHG | TEMPERATURE: 98.6 F | DIASTOLIC BLOOD PRESSURE: 76 MMHG | HEART RATE: 86 BPM | RESPIRATION RATE: 16 BRPM | BODY MASS INDEX: 24.95 KG/M2

## 2024-07-25 DIAGNOSIS — Z3A.12 12 WEEKS GESTATION OF PREGNANCY: ICD-10-CM

## 2024-07-25 DIAGNOSIS — Z36.9 FIRST TRIMESTER SCREENING: Primary | ICD-10-CM

## 2024-07-25 DIAGNOSIS — O36.80X0 ENCOUNTER TO DETERMINE FETAL VIABILITY OF PREGNANCY, SINGLE OR UNSPECIFIED FETUS: ICD-10-CM

## 2024-07-25 LAB
MICROORGANISM SPEC CULT: ABNORMAL
SERVICE CMNT-IMP: ABNORMAL
SPECIMEN DESCRIPTION: ABNORMAL

## 2024-07-25 PROCEDURE — 36415 COLL VENOUS BLD VENIPUNCTURE: CPT

## 2024-07-25 PROCEDURE — 81508 FTL CGEN ABNOR TWO PROTEINS: CPT

## 2024-07-25 PROCEDURE — 76813 OB US NUCHAL MEAS 1 GEST: CPT | Performed by: OBSTETRICS & GYNECOLOGY

## 2024-07-28 LAB
AFP INTERPRETATION: NORMAL
AFP SPECIMEN: NORMAL
CRL: 65.1 MM
CROWN RUMP LENGTH TWIN B: NORMAL MM
CROWN RUMP LENGTH: 65.1
DATE OF BIRTH: NORMAL
DONOR EGG?: NEGATIVE
GESTATIONAL AGE: NORMAL
HX OF HEREDITARY DISORDERS: NO
IN VITRO FERTILIZATION: NEGATIVE
MATERNAL AGE AT EDD: 24.8 YR
MATERNAL SCREEN, EER: NORMAL
MATERNAL WEIGHT: 146
MOM FOR NT, TWIN B: NORMAL
MOM FOR NT: 0.87
MOM FOR PAPP-A: 0.56
MONOCHORIONIC TWINS: NEGATIVE
MSHCG-MOM: 0.82
MSHCG: NORMAL IU/L
NUCHAL TRANSLUC (NT): 1.4
NUCHAL TRANSLUC (NT): 1.4 MM
NUCHAL TRANSLUCENCY TWIN B: NORMAL MM
NUMBER OF FETUSES: 1
NUMBER OF FETUSES: NORMAL
PAPP-A MOM: 567.7 NG/ML
PATIENT WEIGHT UNITS: NORMAL
PATIENT WEIGHT: NORMAL
PREV TRISOMY PREG: NEGATIVE
RACE (MATERNAL): NORMAL
RACE: NORMAL
READING MD CERT NUM: NORMAL
READING MD NAME: NORMAL
REPEAT SPECIMEN?: NEGATIVE
SMOKING: NEGATIVE
SMOKING: NO
SONOGRAPHER CERT NO: NORMAL
SONOGRAPHER CERT NO: NORMAL
SONOGRAPHER NAME: NORMAL
SONOGRAPHER NAME: NORMAL
ULTRASOUND DATE: NORMAL
ULTRASOUND DATE: NORMAL

## 2024-07-29 ENCOUNTER — TELEPHONE (OUTPATIENT)
Dept: OBGYN CLINIC | Age: 24
End: 2024-07-29

## 2024-07-29 NOTE — TELEPHONE ENCOUNTER
Per Reina pt notified of +BV and candida on culture.  Pt to get Flagyl 500mg PO BID x7 days after 15 weeks gestation/pt OB chart documented.  Pt instructed to use OTC monistat if having symptoms of yeast.  Pt voiced understanding to all results and recommendations given.

## 2024-07-29 NOTE — TELEPHONE ENCOUNTER
----- Message from BRIANA Self - CNP sent at 7/29/2024  7:53 AM EDT -----  +BV and candida  Flagyl 500mg PO BID x7 days after 15 weeks gestation  OTC monistat if having symptoms.

## 2024-07-31 LAB — CYTOLOGY REPORT: NORMAL

## 2024-08-21 ENCOUNTER — HOSPITAL ENCOUNTER (OUTPATIENT)
Age: 24
Setting detail: SPECIMEN
Discharge: HOME | End: 2024-08-21

## 2024-08-21 ENCOUNTER — ROUTINE PRENATAL (OUTPATIENT)
Dept: OBGYN CLINIC | Age: 24
End: 2024-08-21
Payer: COMMERCIAL

## 2024-08-21 VITALS
HEART RATE: 87 BPM | DIASTOLIC BLOOD PRESSURE: 82 MMHG | SYSTOLIC BLOOD PRESSURE: 112 MMHG | WEIGHT: 143 LBS | BODY MASS INDEX: 24.55 KG/M2

## 2024-08-21 DIAGNOSIS — O09.92 SUPERVISION OF HIGH RISK PREGNANCY IN SECOND TRIMESTER: Primary | ICD-10-CM

## 2024-08-21 DIAGNOSIS — Z3A.16 16 WEEKS GESTATION OF PREGNANCY: ICD-10-CM

## 2024-08-21 DIAGNOSIS — O09.90 HIGH RISK PREGNANCY, ANTEPARTUM: Primary | ICD-10-CM

## 2024-08-21 PROBLEM — N92.1 MENORRHAGIA WITH IRREGULAR CYCLE: Status: RESOLVED | Noted: 2022-04-17 | Resolved: 2024-08-21

## 2024-08-21 PROCEDURE — G8420 CALC BMI NORM PARAMETERS: HCPCS | Performed by: NURSE PRACTITIONER

## 2024-08-21 PROCEDURE — 1036F TOBACCO NON-USER: CPT | Performed by: NURSE PRACTITIONER

## 2024-08-21 PROCEDURE — 0502F SUBSEQUENT PRENATAL CARE: CPT | Performed by: NURSE PRACTITIONER

## 2024-08-21 PROCEDURE — G8427 DOCREV CUR MEDS BY ELIG CLIN: HCPCS | Performed by: NURSE PRACTITIONER

## 2024-08-21 PROCEDURE — 36415 COLL VENOUS BLD VENIPUNCTURE: CPT | Performed by: NURSE PRACTITIONER

## 2024-08-21 RX ORDER — METRONIDAZOLE 500 MG/1
500 TABLET ORAL 2 TIMES DAILY
Qty: 14 TABLET | Refills: 0 | Status: SHIPPED | OUTPATIENT
Start: 2024-08-21 | End: 2024-08-28

## 2024-08-21 NOTE — PROGRESS NOTES
Rx flagyl for BV on cultures with NOB.     Celia Dinero is a 24 y.o. female 16w1d        OB History    Para Term  AB Living   2       1     SAB IAB Ectopic Molar Multiple Live Births                    # Outcome Date GA Lbr Fabio/2nd Weight Sex Type Anes PTL Lv   2 Current            1 AB                      Vitals  BP: 112/82  Weight - Scale: 64.9 kg (143 lb)  Pulse: 87  Albumin: Negative  Glucose: Negative      The patient was seen and evaluated. There was Positive fetal movements. No contractions or leakage of fluid. Signs and symptoms of  labor as well as labor were reviewed.The Nuchal Translucency testing was reviewed and found to be normal. A single marker MSAFP was ordered for a 15-20 week gestational age window. TOP ST OH Reviewed. Dates were reviewed with the patient.  An 18-22 week anatomy ultrasound has been ordered.  The patient will return to the office for her next visit in 4 weeks. See antepartum flow sheet.     The following was discussed:  A. Counseling on the incidents of birth defects in the general population being 2-4% and that ultrasound does not diagnose every form of congenital abnormality and has limitations .   B. The only way to evaluate the chromosomal makeup to near 100% certainty is with invasive testing such as chorionic villous sampling or amniocentesis.   C. The options for testing listed in (B) with detail and all risks/benefits and alternatives will be discussed in the high risk setting.   D. NIPT testing options will be discussed with the patient, taking a maternal blood sample and screening it for fetal cells then performing a genetic karyotype.  If this were to be positive for                    a trisomy then a confirmatory amniocentesis or [facility] for confirmation would be needed.    E. TOPSTOH guidelines will be reviewed with the patient.      24 Pt will need Flagyl 500mg after 15 wk gest    MAGALY by TVUS: 25 by sono 24    High Risk

## 2024-08-21 NOTE — PROGRESS NOTES
Patient was in the office today for a AFP.    Draw per physician order using sterile technique.  Drawn from the right AC.

## 2024-08-24 LAB
AFP INTERPRETATION: NORMAL
AFP MOM: 0.68
AFP SPECIMEN: NORMAL
AFP: 26 NG/ML
DATE OF BIRTH: NORMAL
DATING METHOD: NORMAL
DETERMINED BY: NORMAL
DIABETIC: NORMAL
DONOR EGG?: NORMAL
DUE DATE: NORMAL
ESTIMATED DUE DATE: NORMAL
FAMILY HISTORY NTD: NEGATIVE
GESTATIONAL AGE: NORMAL
IN VITRO FERTILIZATION: NORMAL
INSULIN REQ DIABETES: NORMAL
LAST MENSTRUAL PERIOD: NORMAL
MATERNAL AGE AT EDD: 24.8 YR
MATERNAL WEIGHT: 143
MONOCHORIONIC TWINS: NEGATIVE
NUMBER OF FETUSES: NORMAL
PATIENT WEIGHT UNITS: NORMAL
PATIENT WEIGHT: NORMAL
RACE (MATERNAL): NORMAL
RACE: NORMAL
REPEAT SPECIMEN?: NORMAL
SMOKING: NO
SMOKING: NO
VALPROIC/CARBAMAZEP: NORMAL
ZZ NTE CLEAN UP: HISTORY: NO

## 2024-08-25 ENCOUNTER — HOSPITAL ENCOUNTER (EMERGENCY)
Age: 24
Discharge: HOME | End: 2024-08-25
Attending: EMERGENCY MEDICINE
Payer: COMMERCIAL

## 2024-08-25 VITALS
HEIGHT: 64 IN | WEIGHT: 144 LBS | TEMPERATURE: 98.2 F | OXYGEN SATURATION: 100 % | BODY MASS INDEX: 24.59 KG/M2 | DIASTOLIC BLOOD PRESSURE: 69 MMHG | RESPIRATION RATE: 18 BRPM | SYSTOLIC BLOOD PRESSURE: 127 MMHG | HEART RATE: 95 BPM

## 2024-08-25 DIAGNOSIS — N30.00 ACUTE CYSTITIS WITHOUT HEMATURIA: Primary | ICD-10-CM

## 2024-08-25 LAB
BACTERIA URNS QL MICRO: ABNORMAL
BILIRUB UR QL STRIP: NEGATIVE
CHARACTER UR: ABNORMAL
CLARITY UR: CLEAR
COLOR UR: YELLOW
CRYSTALS URNS MICRO: ABNORMAL /HPF
EPI CELLS #/AREA URNS HPF: ABNORMAL /HPF (ref 0–5)
GLUCOSE UR STRIP-MCNC: NEGATIVE MG/DL
HGB UR QL STRIP.AUTO: ABNORMAL
KETONES UR STRIP-MCNC: NEGATIVE MG/DL
LEUKOCYTE ESTERASE UR QL STRIP: NEGATIVE
NITRITE UR QL STRIP: NEGATIVE
PH UR STRIP: 6 [PH] (ref 5–6)
PROT UR STRIP-MCNC: NEGATIVE MG/DL
RBC #/AREA URNS HPF: ABNORMAL /HPF (ref 0–4)
SP GR UR STRIP: 1.03 (ref 1.01–1.02)
UROBILINOGEN UR STRIP-ACNC: NORMAL EU/DL (ref 0–1)
WBC #/AREA URNS HPF: ABNORMAL /HPF (ref 0–4)

## 2024-08-25 PROCEDURE — 99283 EMERGENCY DEPT VISIT LOW MDM: CPT

## 2024-08-25 PROCEDURE — 6370000000 HC RX 637 (ALT 250 FOR IP): Performed by: EMERGENCY MEDICINE

## 2024-08-25 PROCEDURE — 81001 URINALYSIS AUTO W/SCOPE: CPT

## 2024-08-25 RX ORDER — NITROFURANTOIN 25; 75 MG/1; MG/1
100 CAPSULE ORAL ONCE
Status: COMPLETED | OUTPATIENT
Start: 2024-08-25 | End: 2024-08-25

## 2024-08-25 RX ORDER — NITROFURANTOIN 25; 75 MG/1; MG/1
100 CAPSULE ORAL 2 TIMES DAILY
Qty: 10 CAPSULE | Refills: 0 | Status: SHIPPED | OUTPATIENT
Start: 2024-08-25 | End: 2024-08-30

## 2024-08-25 RX ADMIN — NITROFURANTOIN MONOHYDRATE/MACROCRYSTALS 100 MG: 75; 25 CAPSULE ORAL at 23:25

## 2024-08-25 ASSESSMENT — PAIN DESCRIPTION - DESCRIPTORS: DESCRIPTORS: PRESSURE

## 2024-08-25 ASSESSMENT — PAIN - FUNCTIONAL ASSESSMENT
PAIN_FUNCTIONAL_ASSESSMENT: 0-10
PAIN_FUNCTIONAL_ASSESSMENT: NONE - DENIES PAIN

## 2024-08-25 ASSESSMENT — PAIN DESCRIPTION - LOCATION: LOCATION: ABDOMEN

## 2024-08-25 ASSESSMENT — PAIN DESCRIPTION - ORIENTATION: ORIENTATION: LOWER

## 2024-08-25 ASSESSMENT — PAIN SCALES - GENERAL: PAINLEVEL_OUTOF10: 2

## 2024-08-26 ENCOUNTER — TELEPHONE (OUTPATIENT)
Dept: OBGYN CLINIC | Age: 24
End: 2024-08-26

## 2024-08-26 RX ORDER — MAGNESIUM OXIDE 400 MG/1
400 TABLET ORAL DAILY
Qty: 30 TABLET | Refills: 1 | Status: SHIPPED | OUTPATIENT
Start: 2024-08-26

## 2024-08-26 NOTE — TELEPHONE ENCOUNTER
Per Jovita NP, returned pt call re: headaches.  Pt denies s/sx of preeclampsia and will notify office of any.  RX for magnesium to be sent to pt pharmacy.  Pt to take 400mg Magox, 1000mg tylenol, and benadryl 50mg daily for HA.  Pt verbalized understanding.

## 2024-08-26 NOTE — TELEPHONE ENCOUNTER
Pt is 16 w / seen at [facility] ED last night due to some vag bleeding/ pt states cutures & UA performed and was + for BV & UTI ( pt given script for antibiotics )     Pt wanted to make provider aware.      In addition - pt states has had persistent headaches, and would like to discuss w/ nurse.     Pt states OK to wait for call back today - [phone]

## 2024-08-26 NOTE — ED PROVIDER NOTES
[facility]  EMERGENCY DEPARTMENT ENCOUNTER      Pt Name: Celia Dinero  MRN: [id number]  Birthdate 2000  Date of evaluation: 2024  Provider: Nick Billings MD    CHIEF COMPLAINT     Chief Complaint   Patient presents with    Vaginal Bleeding     Pt arrives c/o going to bathroom then bright red blood in toilet and on tissue after wiping, reports no pad in place does not currently feel like bleeding, reports pressure as quality of pain \"like constipation\" rated 2/10, denies headache/vision changes, NAD noted         HISTORY OF PRESENT ILLNESS   (Location/Symptom, Timing/Onset, Context/Setting,Quality, Duration, Modifying Factors, Severity)  Note limiting factors.   Celia Dinero is a 24 y.o. female who presents to the emergency department stating she has been experiencing some lower pelvic cramping and tonight when she got off the toilet bowl after voiding she showed blood in the toilet bowl.  She is  2 para 0 currently at nearly 17 weeks of gestation with an otherwise uneventful pregnancy so far, receiving regular OB care.  She has a history of recurrent urinary tract infections.    The history is provided by the patient and medical records.       Nursing Notes werereviewed.    REVIEW OF SYSTEMS    (2-9 systems for level 4, 10 or more for level 5)     Review of Systems   All other systems reviewed and are negative.      Except as noted above the remainder of the review of systems was reviewed and negative.       PAST MEDICAL HISTORY     Past Medical History:   Diagnosis Date    ADHD (attention deficit hyperactivity disorder)     Diagnosed as child. Didn’t pursue medication.    Anxiety 2022    Depression 2022    Headache     Psoriasis     Psoriasis     Psoriatic arthritis (HCC)          SURGICALHISTORY       Past Surgical History:   Procedure Laterality Date    WISDOM TOOTH EXTRACTION           CURRENT MEDICATIONS       Discharge Medication List as of 2024 11:22 PM     lesions identified.  I do not see any blood staining the perianal region or the external genitalia.  Speculum examination of the vagina reveals small amount of mucus with no sign of fresh or old blood.  The cervix is closed.  Neurological:      Mental Status: She is alert.         DIAGNOSTIC RESULTS     EKG: All EKG's are interpreted by the Emergency Department Physician who either signs orCo-signs this chart in the absence of a cardiologist.    RADIOLOGY:     Interpretation per the Radiologist below, ifavailable at the time of this note:    No orders to display         ED BEDSIDE ULTRASOUND:   Performed by ED Physician - none    LABS:  Labs Reviewed   URINALYSIS WITH REFLEX TO CULTURE - Abnormal; Notable for the following components:       Result Value    Specific Gravity, UA 1.034 (*)     Urine Hgb 2+ (*)     All other components within normal limits   MICROSCOPIC URINALYSIS - Abnormal; Notable for the following components:    Crystals, UA MANY CALCIUM OXALATE (*)     Bacteria, UA MODERATE (*)     Other Observations UA   (*)     Value: Utilizing a urinalysis as the only screening method to exclude a potential uropathogen can be unreliable in many patient populations.  Rapid screening tests are less sensitive than culture and if UTI is a clinical possibility, culture should be considered despite a negative urinalysis.      All other components within normal limits       All other labs were within normal range ornot returned as of this dictation.    EMERGENCY DEPARTMENT COURSE and DIFFERENTIAL DIAGNOSIS/MDM:   Vitals:    Vitals:    08/25/24 2237 08/25/24 2310 08/25/24 2326   BP: (!) 141/93 127/69    Pulse: 95     Resp: 18  18   Temp: 98.2 °F (36.8 °C)     TempSrc: Tympanic     SpO2: 100%     Weight: 65.3 kg (144 lb)     Height: 1.626 m (5' 4\")              Urine looks suspicious for infection and will be cultured.  Patient is placed on Macrobid for 5 days and is referred to her OB for early follow-up after the

## 2024-08-26 NOTE — DISCHARGE INSTRUCTIONS
Contact your obstetrician in the morning for further evaluation and management.  Return anytime for worsening symptoms.

## 2024-09-06 ENCOUNTER — OFFICE VISIT (OUTPATIENT)
Dept: FAMILY MEDICINE CLINIC | Age: 24
End: 2024-09-06
Payer: COMMERCIAL

## 2024-09-06 VITALS
WEIGHT: 146 LBS | HEIGHT: 64 IN | SYSTOLIC BLOOD PRESSURE: 122 MMHG | DIASTOLIC BLOOD PRESSURE: 70 MMHG | BODY MASS INDEX: 24.92 KG/M2 | OXYGEN SATURATION: 99 % | HEART RATE: 89 BPM

## 2024-09-06 DIAGNOSIS — F90.2 ATTENTION DEFICIT HYPERACTIVITY DISORDER (ADHD), COMBINED TYPE: ICD-10-CM

## 2024-09-06 DIAGNOSIS — R63.4 WEIGHT LOSS: ICD-10-CM

## 2024-09-06 DIAGNOSIS — F43.10 PTSD (POST-TRAUMATIC STRESS DISORDER): Primary | ICD-10-CM

## 2024-09-06 DIAGNOSIS — F33.9 CHRONIC MAJOR DEPRESSIVE DISORDER, RECURRENT EPISODE (HCC): ICD-10-CM

## 2024-09-06 PROCEDURE — G8427 DOCREV CUR MEDS BY ELIG CLIN: HCPCS | Performed by: FAMILY MEDICINE

## 2024-09-06 PROCEDURE — 1036F TOBACCO NON-USER: CPT | Performed by: FAMILY MEDICINE

## 2024-09-06 PROCEDURE — 99213 OFFICE O/P EST LOW 20 MIN: CPT | Performed by: FAMILY MEDICINE

## 2024-09-06 PROCEDURE — G8419 CALC BMI OUT NRM PARAM NOF/U: HCPCS | Performed by: FAMILY MEDICINE

## 2024-09-06 ASSESSMENT — ENCOUNTER SYMPTOMS
CONSTIPATION: 0
ABDOMINAL DISTENTION: 0
BLOOD IN STOOL: 0
SHORTNESS OF BREATH: 0
CHEST TIGHTNESS: 0
DIARRHEA: 0
EYE REDNESS: 0
SORE THROAT: 0
COLOR CHANGE: 0
STRIDOR: 0
SINUS PRESSURE: 0
COUGH: 0
ABDOMINAL PAIN: 0
BACK PAIN: 0

## 2024-09-06 NOTE — PROGRESS NOTES
Follow up. Was seen in ER recently for vaginal bleeding.       Visit Information    Have you changed or started any medications since your last visit including any over-the-counter medicines, vitamins, or herbal medicines? No  Have you stopped taking any of your medications? Is so, why? -  No  Are you having any side effects from any of your medications? - No  Have you seen any other physician or provider since your last visit? - No  Have you had any other diagnostic tests since your last visit?  -  No  Have you been seen in the emergency room and/or had an admission in a hospital since we last saw you?  -  Yes  Have you had your routine dental cleaning in the past 6 months?  -  No  Do you have an active MyChart account? If no, what is the barrier? - Yes     Patient Care Team:  Agnes Willams MD as PCP - General (Family Medicine)  Agnes Willams MD as PCP - Empaneled Provider    Medical History Review  Past Medical, Family, and Social History reviewed and contribute to the patient presenting condition    Health Maintenance   Topic Date Due    HPV vaccine (1 - 3-dose series) Never done    Hepatitis B vaccine (1 of 3 - 19+ 3-dose series) Never done    Flu vaccine (1) 08/01/2024    COVID-19 Vaccine (3 - 2023-24 season) 09/01/2024    Tdap Vaccine during Pregnancy  11/05/2024    Respiratory Syncytial Virus (RSV) Pregnant or age 60 yrs+ (1 - Risk pregnant 1-dose series) 12/10/2024    Depression Monitoring  06/20/2025    Chlamydia/GC screen  07/22/2025    Pap smear  07/22/2027    DTaP/Tdap/Td vaccine (2 - Td or Tdap) 05/09/2032    Meningococcal (ACWY) vaccine  Completed    Hepatitis C screen  Completed    HIV screen  Completed    Hepatitis A vaccine  Aged Out    Hib vaccine  Aged Out    Polio vaccine  Aged Out    Pneumococcal 0-64 years Vaccine  Aged Out    Varicella vaccine  Discontinued    Depression Screen  Discontinued           
hyperactivity disorder (ADHD), combined type  Chronic problem discussed with patient to follow-up with behavioral therapist    4. Chronic major depressive disorder, recurrent episode (HCC)  Referral placed to physical therapist  - Ambulatory referral to Behavioral Health        Orders Placed This Encounter   Procedures    Ambulatory referral to Behavioral Health     Referral Priority:   Routine     Referral Type:   Eval and Treat     Referral Reason:   Specialty Services Required     Referred to Provider:   Sushma Sinclair LISW     Requested Specialty:   Behavioral Health     Number of Visits Requested:   1         There are no discontinued medications.    Celia received counseling on the following healthy behaviors: nutrition, exercise, and medication adherence  Reviewed prior labs and health maintenance  Continue current medications, diet and exercise.  Discussed use, benefit, and side effects of prescribed medications. Barriers to medication compliance addressed.   Patient given educational materials - see patient instructions  Was a self-tracking handout given in paper form or via [employer]? Yes    Requested Prescriptions      No prescriptions requested or ordered in this encounter       All patient questions answered.  Patient voiced understanding.    Quality Measures    Body mass index is 25.05 kg/m². Normal. Weight control planned discussed Healthy diet and regular exercise.    BP: 122/70 Blood pressure is normal. Treatment plan consists of No treatment change needed.    No results found for: \"LDLDIRECT\" (goal LDL reduction with dx if diabetes is 50% LDL reduction)          6/20/2024    12:09 PM 6/6/2024    11:07 AM 5/30/2024    10:06 AM 4/7/2022     4:12 PM   PHQ Scores   PHQ2 Score 3 2 2 5   PHQ9 Score 18 16 2 20     Interpretation of Total Score Depression Severity: 1-4 = Minimal depression, 5-9 = Mild depression, 10-14 = Moderate depression, 15-19 = Moderately severe depression, 20-27 = Severe

## 2024-09-18 ENCOUNTER — ROUTINE PRENATAL (OUTPATIENT)
Dept: OBGYN CLINIC | Age: 24
End: 2024-09-18

## 2024-09-18 ENCOUNTER — HOSPITAL ENCOUNTER (OUTPATIENT)
Age: 24
Setting detail: SPECIMEN
Discharge: HOME | End: 2024-09-18

## 2024-09-18 VITALS
HEART RATE: 76 BPM | BODY MASS INDEX: 25.56 KG/M2 | WEIGHT: 149 LBS | SYSTOLIC BLOOD PRESSURE: 122 MMHG | DIASTOLIC BLOOD PRESSURE: 78 MMHG

## 2024-09-18 DIAGNOSIS — F43.10 PTSD (POST-TRAUMATIC STRESS DISORDER): ICD-10-CM

## 2024-09-18 DIAGNOSIS — O23.40 URINARY TRACT INFECTION IN MOTHER DURING PREGNANCY, ANTEPARTUM: Primary | ICD-10-CM

## 2024-09-18 DIAGNOSIS — G57.61 MORTON'S NEUROMA OF RIGHT FOOT: ICD-10-CM

## 2024-09-18 DIAGNOSIS — F33.9 CHRONIC MAJOR DEPRESSIVE DISORDER, RECURRENT EPISODE (HCC): ICD-10-CM

## 2024-09-18 DIAGNOSIS — Z3A.20 20 WEEKS GESTATION OF PREGNANCY: ICD-10-CM

## 2024-09-18 DIAGNOSIS — O09.92 HIGH-RISK PREGNANCY IN SECOND TRIMESTER: ICD-10-CM

## 2024-09-18 DIAGNOSIS — O09.92 HIGH-RISK PREGNANCY IN SECOND TRIMESTER: Primary | ICD-10-CM

## 2024-09-18 LAB
BILIRUB UR QL STRIP: NEGATIVE
CLARITY UR: CLEAR
COLOR UR: YELLOW
COMMENT: NORMAL
GLUCOSE UR STRIP-MCNC: NEGATIVE MG/DL
HGB UR QL STRIP.AUTO: NEGATIVE
KETONES UR STRIP-MCNC: NEGATIVE MG/DL
LEUKOCYTE ESTERASE UR QL STRIP: NEGATIVE
NITRITE UR QL STRIP: NEGATIVE
PH UR STRIP: 7.5 [PH] (ref 5–8)
PROT UR STRIP-MCNC: NEGATIVE MG/DL
SP GR UR STRIP: 1.01 (ref 1–1.03)
UROBILINOGEN UR STRIP-ACNC: NORMAL EU/DL (ref 0–1)

## 2024-09-18 PROCEDURE — 0502F SUBSEQUENT PRENATAL CARE: CPT | Performed by: OBSTETRICS & GYNECOLOGY

## 2024-09-18 PROCEDURE — 1036F TOBACCO NON-USER: CPT | Performed by: OBSTETRICS & GYNECOLOGY

## 2024-09-18 PROCEDURE — G8419 CALC BMI OUT NRM PARAM NOF/U: HCPCS | Performed by: OBSTETRICS & GYNECOLOGY

## 2024-09-18 PROCEDURE — G8427 DOCREV CUR MEDS BY ELIG CLIN: HCPCS | Performed by: OBSTETRICS & GYNECOLOGY

## 2024-09-19 ENCOUNTER — HOSPITAL ENCOUNTER (OUTPATIENT)
Age: 24
Discharge: HOME | End: 2024-09-19
Payer: COMMERCIAL

## 2024-09-19 ENCOUNTER — ROUTINE PRENATAL (OUTPATIENT)
Dept: PERINATAL CARE | Age: 24
End: 2024-09-19
Payer: COMMERCIAL

## 2024-09-19 VITALS
SYSTOLIC BLOOD PRESSURE: 133 MMHG | RESPIRATION RATE: 16 BRPM | DIASTOLIC BLOOD PRESSURE: 73 MMHG | TEMPERATURE: 98.1 F | WEIGHT: 151.68 LBS | BODY MASS INDEX: 25.89 KG/M2 | HEIGHT: 64 IN | HEART RATE: 77 BPM

## 2024-09-19 DIAGNOSIS — Z34.82 ENCOUNTER FOR SUPERVISION OF OTHER NORMAL PREGNANCY IN SECOND TRIMESTER: ICD-10-CM

## 2024-09-19 DIAGNOSIS — Z36.86 ENCOUNTER FOR SCREENING FOR RISK OF PRE-TERM LABOR: ICD-10-CM

## 2024-09-19 DIAGNOSIS — Z36.3 ENCOUNTER FOR ROUTINE SCREENING FOR MALFORMATION USING ULTRASONICS: Primary | ICD-10-CM

## 2024-09-19 DIAGNOSIS — Z3A.20 20 WEEKS GESTATION OF PREGNANCY: ICD-10-CM

## 2024-09-19 PROCEDURE — 76805 OB US >/= 14 WKS SNGL FETUS: CPT | Performed by: OBSTETRICS & GYNECOLOGY

## 2024-09-19 PROCEDURE — 76817 TRANSVAGINAL US OBSTETRIC: CPT | Performed by: OBSTETRICS & GYNECOLOGY

## 2024-09-19 PROCEDURE — 36415 COLL VENOUS BLD VENIPUNCTURE: CPT

## 2024-09-20 LAB
SEND OUT REPORT: NORMAL
TEST NAME: NORMAL

## 2024-10-03 ENCOUNTER — HOSPITAL ENCOUNTER (EMERGENCY)
Age: 24
Discharge: ANOTHER HOSPITAL | End: 2024-10-03
Attending: EMERGENCY MEDICINE
Payer: COMMERCIAL

## 2024-10-03 VITALS
SYSTOLIC BLOOD PRESSURE: 131 MMHG | HEART RATE: 104 BPM | OXYGEN SATURATION: 99 % | DIASTOLIC BLOOD PRESSURE: 80 MMHG | RESPIRATION RATE: 18 BRPM | TEMPERATURE: 97.2 F

## 2024-10-03 DIAGNOSIS — R39.15 URINARY URGENCY DURING PREGNANCY: Primary | ICD-10-CM

## 2024-10-03 DIAGNOSIS — O26.899 URINARY URGENCY DURING PREGNANCY: Primary | ICD-10-CM

## 2024-10-03 LAB
ANION GAP SERPL CALCULATED.3IONS-SCNC: 11 MMOL/L (ref 9–16)
BACTERIA URNS QL MICRO: NORMAL
BASOPHILS # BLD: 0.09 K/UL (ref 0–0.2)
BASOPHILS NFR BLD: 1 % (ref 0–2)
BILIRUB UR QL STRIP: NEGATIVE
BUN SERPL-MCNC: 5 MG/DL (ref 6–20)
CALCIUM SERPL-MCNC: 8.9 MG/DL (ref 8.6–10.4)
CASTS #/AREA URNS LPF: NORMAL /LPF (ref 0–8)
CHLORIDE SERPL-SCNC: 105 MMOL/L (ref 98–107)
CLARITY UR: CLEAR
CO2 SERPL-SCNC: 21 MMOL/L (ref 20–31)
COLOR UR: YELLOW
CREAT SERPL-MCNC: 0.5 MG/DL (ref 0.5–0.9)
EOSINOPHIL # BLD: 0.1 K/UL (ref 0–0.44)
EOSINOPHILS RELATIVE PERCENT: 1 % (ref 1–4)
EPI CELLS #/AREA URNS HPF: NORMAL /HPF (ref 0–5)
ERYTHROCYTE [DISTWIDTH] IN BLOOD BY AUTOMATED COUNT: 12.9 % (ref 11.8–14.4)
GFR, ESTIMATED: >90 ML/MIN/1.73M2
GLUCOSE SERPL-MCNC: 100 MG/DL (ref 74–99)
GLUCOSE UR STRIP-MCNC: NEGATIVE MG/DL
HCT VFR BLD AUTO: 39.8 % (ref 36.3–47.1)
HGB BLD-MCNC: 13.2 G/DL (ref 11.9–15.1)
HGB UR QL STRIP.AUTO: NEGATIVE
IMM GRANULOCYTES # BLD AUTO: 0.29 K/UL (ref 0–0.3)
IMM GRANULOCYTES NFR BLD: 2 %
KETONES UR STRIP-MCNC: NEGATIVE MG/DL
LEUKOCYTE ESTERASE UR QL STRIP: NEGATIVE
LYMPHOCYTES NFR BLD: 3.05 K/UL (ref 1.1–3.7)
LYMPHOCYTES RELATIVE PERCENT: 22 % (ref 24–43)
MCH RBC QN AUTO: 28.6 PG (ref 25.2–33.5)
MCHC RBC AUTO-ENTMCNC: 33.2 G/DL (ref 28.4–34.8)
MCV RBC AUTO: 86.1 FL (ref 82.6–102.9)
MONOCYTES NFR BLD: 0.85 K/UL (ref 0.1–1.2)
MONOCYTES NFR BLD: 6 % (ref 3–12)
NEUTROPHILS NFR BLD: 68 % (ref 36–65)
NEUTS SEG NFR BLD: 9.75 K/UL (ref 1.5–8.1)
NITRITE UR QL STRIP: NEGATIVE
NRBC BLD-RTO: 0 PER 100 WBC
PH UR STRIP: 6.5 [PH] (ref 5–8)
PLATELET # BLD AUTO: 253 K/UL (ref 138–453)
PMV BLD AUTO: 9.9 FL (ref 8.1–13.5)
POTASSIUM SERPL-SCNC: 3.5 MMOL/L (ref 3.7–5.3)
PROT UR STRIP-MCNC: NEGATIVE MG/DL
RBC # BLD AUTO: 4.62 M/UL (ref 3.95–5.11)
RBC #/AREA URNS HPF: NORMAL /HPF (ref 0–4)
SODIUM SERPL-SCNC: 137 MMOL/L (ref 136–145)
SP GR UR STRIP: 1.01 (ref 1–1.03)
UROBILINOGEN UR STRIP-ACNC: NORMAL EU/DL (ref 0–1)
WBC #/AREA URNS HPF: NORMAL /HPF (ref 0–5)
WBC OTHER # BLD: 14.1 K/UL (ref 3.5–11.3)

## 2024-10-03 PROCEDURE — 99283 EMERGENCY DEPT VISIT LOW MDM: CPT

## 2024-10-03 PROCEDURE — 87491 CHLMYD TRACH DNA AMP PROBE: CPT

## 2024-10-03 PROCEDURE — 87591 N.GONORRHOEAE DNA AMP PROB: CPT

## 2024-10-03 PROCEDURE — 6370000000 HC RX 637 (ALT 250 FOR IP)

## 2024-10-03 PROCEDURE — 87086 URINE CULTURE/COLONY COUNT: CPT

## 2024-10-03 PROCEDURE — 87480 CANDIDA DNA DIR PROBE: CPT

## 2024-10-03 PROCEDURE — 87660 TRICHOMONAS VAGIN DIR PROBE: CPT

## 2024-10-03 PROCEDURE — 81001 URINALYSIS AUTO W/SCOPE: CPT

## 2024-10-03 PROCEDURE — 80048 BASIC METABOLIC PNL TOTAL CA: CPT

## 2024-10-03 PROCEDURE — 85025 COMPLETE CBC W/AUTO DIFF WBC: CPT

## 2024-10-03 PROCEDURE — 87510 GARDNER VAG DNA DIR PROBE: CPT

## 2024-10-03 RX ORDER — POTASSIUM CHLORIDE 1500 MG/1
40 TABLET, EXTENDED RELEASE ORAL ONCE
Status: COMPLETED | OUTPATIENT
Start: 2024-10-03 | End: 2024-10-03

## 2024-10-03 RX ADMIN — POTASSIUM CHLORIDE 40 MEQ: 1500 TABLET, EXTENDED RELEASE ORAL at 22:57

## 2024-10-04 PROBLEM — Z3A.22 22 WEEKS GESTATION OF PREGNANCY: Status: ACTIVE | Noted: 2024-10-04

## 2024-10-04 LAB
C TRACH DNA SPEC QL PROBE+SIG AMP: NEGATIVE
CANDIDA SPECIES: NEGATIVE
GARDNERELLA VAGINALIS: NEGATIVE
MICROORGANISM SPEC CULT: NO GROWTH
N GONORRHOEA DNA SPEC QL PROBE+SIG AMP: NEGATIVE
SERVICE CMNT-IMP: NORMAL
SOURCE: NORMAL
SPECIMEN DESCRIPTION: NORMAL
SPECIMEN DESCRIPTION: NORMAL
TRICHOMONAS: NEGATIVE

## 2024-10-04 ASSESSMENT — ENCOUNTER SYMPTOMS
CONSTIPATION: 1
VOMITING: 0
COUGH: 0
WHEEZING: 0
ABDOMINAL PAIN: 0
DIARRHEA: 0
NAUSEA: 0
BLOOD IN STOOL: 0
SHORTNESS OF BREATH: 1

## 2024-10-04 NOTE — ED PROVIDER NOTES
[facility] ED  Emergency Department Encounter  Emergency Medicine Resident     Pt Name:Celia Dinero  MRN: [id number]  Birthdate 2000  Date of evaluation: 10/3/24  PCP:  Agnes Willams MD  Note Started: 9:09 PM EDT      CHIEF COMPLAINT       Chief Complaint   Patient presents with    Flank Pain    Urinary Frequency       HISTORY OF PRESENT ILLNESS  (Location/Symptom, Timing/Onset, Context/Setting, Quality, Duration, Modifying Factors, Severity.)      Cleia Dinero is a 24 y.o. female who is 22 weeks pregnant,  presents with UTI symptoms.  Reports she is having increased urgency and feeling as if she is not fully emptying her bladder. Also complaining of back pain in her mid back bilaterally, although more on the left side. Denies any dysuria, fever, chills, hematuria. States she has a history of frequent UTIs since 15 years old.  Reports she has not been drinking much water and feels like she is possibly dehydrated. States she is also having some mild shortness of breath on exertion, states it could be due to pregnancy, denies history of asthma or COPD.  Also states that she is having constipation which is new for her, has not had a bowel movement for the past few days.  States she did have vaginal bleeding earlier in her pregnancy, did go to the ED and followed up with her OB/GYN at that time, states her OB told her there is a possible concern for placental previa. Denies vaginal bleeding, chest pain, nausea, vomiting, diarrhea, blood in the stool.     PAST MEDICAL / SURGICAL / SOCIAL / FAMILY HISTORY      has a past medical history of ADHD (attention deficit hyperactivity disorder), Anxiety, Depression, Headache, Psoriasis, Psoriasis, and Psoriatic arthritis (HCC).       has a past surgical history that includes [place] tooth extraction.      Social History     Socioeconomic History    Marital status: Single     Spouse name: Not on file    Number of children: Not on file    Years

## 2024-10-04 NOTE — ED PROVIDER NOTES
[facility] ED     Emergency Department     Faculty Attestation    I performed a history and physical examination of the patient and discussed management with the resident. I reviewed the resident’s note and agree with the documented findings and plan of care. Any areas of disagreement are noted on the chart. I was personally present for the key portions of any procedures. I have documented in the chart those procedures where I was not present during the key portions. I have reviewed the emergency nurses triage note. I agree with the chief complaint, past medical history, past surgical history, allergies, medications, social and family history as documented unless otherwise noted below. For Physician Assistant/ Nurse Practitioner cases/documentation I have personally evaluated this patient and have completed at least one if not all key elements of the E/M (history, physical exam, and MDM). Additional findings are as noted.    9:11 PM EDT    Patient who is about 22 weeks pregnant presents with right flank pain.  Patient says she has frequent UTIs and this feels like a typical UTI for her.  She says she has been having urinary frequency.  She denies any abnormal vaginal bleeding or abdominal cramping.  On exam, patient is resting comfortably in the bed.  She is alert and oriented and answering questions appropriately.  There is mild CVA tenderness.  Abdomen is gravid and fundus is felt above the umbilicus.  Abdomen is nontender.  Will check labs and plan to speak with OB.      Reina Collazo MD  Attending Emergency  Physician

## 2024-10-04 NOTE — DISCHARGE INSTRUCTIONS
You were evaluated in the emergency department for possible UTI.  Your urine was negative for a UTI.  Your potassium was mildly low, you were given a tablet for placement.  Please follow-up with your OB/GYN for further evaluation and treatment.     PLEASE RETURN TO THE EMERGENCY DEPARTMENT IMMEDIATELY for worsening symptoms, inability to urinate, worsening of blood in your urine, or if you develop any concerning symptoms such as: high fever not relieved by acetaminophen (Tylenol) and/or ibuprofen (Motrin / Advil), chills, shortness of breath, chest pain, feeling of your heart fluttering or racing, persistent nausea and/or vomiting, vomiting up blood, blood in your stool, loss of consciousness, numbness, weakness or tingling in the arms or legs or change in color of the extremities, changes in mental status, persistent headache, blurry vision, loss of bladder / bowel.

## 2024-10-04 NOTE — ED NOTES
Pt arrives to ED 37 via triage.   Pt co possible UTI.   Pt states that she has a hx of UTI and kidney infections.   Pt states that she is experiencing urinary frequency.   Pt states that she is 22 weeks pregnant.  Pt states that during pregnancy, it is more difficult to detect UTI symptoms.  Pt denies any burning with urination.   Pt states that she is beginning to experience back pain.   Pt states that she does not drink water often.   Pt states that she is able to keep food and fluids down.   Pt denies any vomiting.   Pt respirations are even and unlabored, pt is alert and oriented X 4, speaking in complete sentences, bed is in the lowest position, call light is within reach, NAD noted.   Will continue to follow plan of care.

## 2024-10-04 NOTE — CONSULTS
TESTING NOTE    Celia Dinero is a 24 y.o. female  at 22w2d    The patient was seen in the ED for increased urinary frequency and increased pelvic pressure. She had a thorough workup for UTI and gyn infections. Her UA was negative for bacteria, nitrites or leukocyte esterase and Ucx pending. Vaginitis panel negative and GC still pending. ED resident performed pelvic exam and was unremarkable. Patient felt comfortable to be discharged from the ED and was recommended to come to L&D for NST.     The baby is moving well and she denies any complaints.    Patient Active Problem List   Diagnosis    Anxiety    Plaque psoriasis    PTSD (post-traumatic stress disorder)    Chronic major depressive disorder, recurrent episode (HCC)    Attention deficit hyperactivity disorder (ADHD), combined type    Weight loss    Arrington's neuroma of right foot    High risk pregnancy, antepartum       Vitals:  Vitals:    10/03/24 2034 10/03/24 2040   BP: (!) 153/87 131/80   Pulse: (!) 104    Resp: 18    Temp: 97.2 °F (36.2 °C)    TempSrc: Oral    SpO2: 99%        NST:   Fetal heart rate baseline: 135, moderate variability, accelerations present, absent decelerations     The tracing has been reviewed and is considered reactive.      Assessment/Plan:  Celia Dinero is a 24 y.o. female  at 22w2d  NST reactive   - Patient has no other concerns at this time  - Patient may be discharged to home. Patient was counseled on adequate PO hydration and fetal kick counts. She was given strict precautions and instructed to call her OB office or return to L&D Triage if she experiences contractions that increase in intensity or become closer together, if she experiences vaginal bleeding, leakage of fluids, or decreased fetal movements. Patient was instructed to return to the hospital if she experiences unrelenting headache, vision changes, RUQ pain, nausea, vomiting, and peripheral edema. Patient voiced understanding.     - The

## 2024-10-16 ENCOUNTER — ROUTINE PRENATAL (OUTPATIENT)
Dept: OBGYN CLINIC | Age: 24
End: 2024-10-16

## 2024-10-16 VITALS
BODY MASS INDEX: 26.78 KG/M2 | SYSTOLIC BLOOD PRESSURE: 114 MMHG | DIASTOLIC BLOOD PRESSURE: 72 MMHG | WEIGHT: 156 LBS | HEART RATE: 99 BPM

## 2024-10-16 DIAGNOSIS — O09.92 HIGH-RISK PREGNANCY IN SECOND TRIMESTER: Primary | ICD-10-CM

## 2024-10-16 DIAGNOSIS — Z3A.24 24 WEEKS GESTATION OF PREGNANCY: ICD-10-CM

## 2024-10-16 PROCEDURE — 0502F SUBSEQUENT PRENATAL CARE: CPT | Performed by: NURSE PRACTITIONER

## 2024-10-16 PROCEDURE — G8427 DOCREV CUR MEDS BY ELIG CLIN: HCPCS | Performed by: NURSE PRACTITIONER

## 2024-10-16 PROCEDURE — 1036F TOBACCO NON-USER: CPT | Performed by: NURSE PRACTITIONER

## 2024-10-16 PROCEDURE — G8419 CALC BMI OUT NRM PARAM NOF/U: HCPCS | Performed by: NURSE PRACTITIONER

## 2024-10-16 PROCEDURE — G8484 FLU IMMUNIZE NO ADMIN: HCPCS | Performed by: NURSE PRACTITIONER

## 2024-10-16 NOTE — PROGRESS NOTES
Celia Dinero is a 24 y.o. female 24w1d        OB History    Para Term  AB Living   2       1     SAB IAB Ectopic Molar Multiple Live Births                    # Outcome Date GA Lbr Fabio/2nd Weight Sex Type Anes PTL Lv   2 Current            1 AB                Vitals  BP: 114/72  Weight - Scale: 70.8 kg (156 lb)  Pulse: 99  Patient Position: Sitting  Albumin: Negative  Glucose: Negative  Fundal Height (cm): 24 cm  Fetal HR: 150  Movement: Present    The patient was seen and evaluated. There was positive fetal movements. No contractions or leakage of fluid. Signs and symptoms of  labor as well as labor were reviewed.The Nuchal Translucency testing was reviewed and found to be normal A single marker MSAFP was reviewed and found to be normal. The patients anatomy ultrasound has been completed and reviewed with patient. TOP ST OH Reviewed. A 28 week lab panel was ordered. This includes a (HH, 1 hr GTT, U/A C&S). The patient is to complete this in the next two to four weeks.    The following was discussed:  A. Counseling on the incidents of birth defects in the general population being 2-4% and that ultrasound does not diagnose every form of congenital abnormality and has limitations .   B. The only way to evaluate the chromosomal makeup to near 100% certainty is with invasive testing such as chorionic villous sampling or amniocentesis.   C. The options for testing listed in (B) with detail and all risks/benefits and alternatives will be discussed in the high risk setting.   D. NIPT testing options will be discussed with the patient, taking a maternal blood sample and screening it for fetal cells then performing a genetic karyotype.  If this were to be positive for                    a trisomy then a confirmatory amniocentesis or [facility] for confirmation would be needed.    E. TOPSTOH guidelines will be reviewed with the patient.      The S/S of Pre-Eclampsia were reviewed with the

## 2024-11-03 ASSESSMENT — ANXIETY QUESTIONNAIRES
2. NOT BEING ABLE TO STOP OR CONTROL WORRYING: NEARLY EVERY DAY
3. WORRYING TOO MUCH ABOUT DIFFERENT THINGS: NEARLY EVERY DAY
IF YOU CHECKED OFF ANY PROBLEMS ON THIS QUESTIONNAIRE, HOW DIFFICULT HAVE THESE PROBLEMS MADE IT FOR YOU TO DO YOUR WORK, TAKE CARE OF THINGS AT HOME, OR GET ALONG WITH OTHER PEOPLE: VERY DIFFICULT
6. BECOMING EASILY ANNOYED OR IRRITABLE: NEARLY EVERY DAY
4. TROUBLE RELAXING: MORE THAN HALF THE DAYS
5. BEING SO RESTLESS THAT IT IS HARD TO SIT STILL: NEARLY EVERY DAY
GAD7 TOTAL SCORE: 18
1. FEELING NERVOUS, ANXIOUS, OR ON EDGE: NEARLY EVERY DAY
7. FEELING AFRAID AS IF SOMETHING AWFUL MIGHT HAPPEN: SEVERAL DAYS

## 2024-11-04 ASSESSMENT — ANXIETY QUESTIONNAIRES
3. WORRYING TOO MUCH ABOUT DIFFERENT THINGS: NEARLY EVERY DAY
4. TROUBLE RELAXING: MORE THAN HALF THE DAYS
2. NOT BEING ABLE TO STOP OR CONTROL WORRYING: NEARLY EVERY DAY
5. BEING SO RESTLESS THAT IT IS HARD TO SIT STILL: NEARLY EVERY DAY
6. BECOMING EASILY ANNOYED OR IRRITABLE: NEARLY EVERY DAY
7. FEELING AFRAID AS IF SOMETHING AWFUL MIGHT HAPPEN: SEVERAL DAYS
IF YOU CHECKED OFF ANY PROBLEMS ON THIS QUESTIONNAIRE, HOW DIFFICULT HAVE THESE PROBLEMS MADE IT FOR YOU TO DO YOUR WORK, TAKE CARE OF THINGS AT HOME, OR GET ALONG WITH OTHER PEOPLE: VERY DIFFICULT
1. FEELING NERVOUS, ANXIOUS, OR ON EDGE: NEARLY EVERY DAY

## 2024-11-04 ASSESSMENT — LIFESTYLE VARIABLES
HAVE YOU EVER RECEIVED ALCOHOL OR OTHER DRUG ABUSE TREATMENT: NO
PAST THREE MONTHS WHAT IS THE LARGEST AMOUNT OF ALCOHOLIC DRINKS YOU HAVE CONSUMED IN ONE DAY: 0
ALCOHOL_DAYS_PER_WEEK: 0
HISTORY_ALCOHOL_USE: NO

## 2024-11-13 ENCOUNTER — ROUTINE PRENATAL (OUTPATIENT)
Dept: OBGYN CLINIC | Age: 24
End: 2024-11-13

## 2024-11-13 VITALS
HEART RATE: 84 BPM | BODY MASS INDEX: 27.98 KG/M2 | DIASTOLIC BLOOD PRESSURE: 64 MMHG | WEIGHT: 163 LBS | SYSTOLIC BLOOD PRESSURE: 112 MMHG

## 2024-11-13 DIAGNOSIS — F43.10 PTSD (POST-TRAUMATIC STRESS DISORDER): ICD-10-CM

## 2024-11-13 DIAGNOSIS — Z3A.28 28 WEEKS GESTATION OF PREGNANCY: ICD-10-CM

## 2024-11-13 DIAGNOSIS — G57.61 MORTON'S NEUROMA OF RIGHT FOOT: ICD-10-CM

## 2024-11-13 DIAGNOSIS — L50.9 URTICARIA: ICD-10-CM

## 2024-11-13 DIAGNOSIS — O09.93 HIGH-RISK PREGNANCY IN THIRD TRIMESTER: Primary | ICD-10-CM

## 2024-11-13 DIAGNOSIS — F33.9 CHRONIC MAJOR DEPRESSIVE DISORDER, RECURRENT EPISODE (HCC): ICD-10-CM

## 2024-11-13 PROCEDURE — 1036F TOBACCO NON-USER: CPT | Performed by: OBSTETRICS & GYNECOLOGY

## 2024-11-13 PROCEDURE — G8484 FLU IMMUNIZE NO ADMIN: HCPCS | Performed by: OBSTETRICS & GYNECOLOGY

## 2024-11-13 PROCEDURE — G8419 CALC BMI OUT NRM PARAM NOF/U: HCPCS | Performed by: OBSTETRICS & GYNECOLOGY

## 2024-11-13 PROCEDURE — G8427 DOCREV CUR MEDS BY ELIG CLIN: HCPCS | Performed by: OBSTETRICS & GYNECOLOGY

## 2024-11-13 PROCEDURE — 0502F SUBSEQUENT PRENATAL CARE: CPT | Performed by: OBSTETRICS & GYNECOLOGY

## 2024-11-13 NOTE — PROGRESS NOTES
.mejia        Celia Dinero is a 24 y.o. female 28w1d        OB History    Para Term  AB Living   2       1     SAB IAB Ectopic Molar Multiple Live Births                    # Outcome Date GA Lbr Fabio/2nd Weight Sex Type Anes PTL Lv   2 Current            1 AB                Vitals  BP: 112/64  Weight - Scale: 73.9 kg (163 lb)  Pulse: 84  Patient Position: Sitting  Albumin: Negative  Glucose: Negative      The patient was seen and evaluated. There was positive fetal movements. No contractions or leakage of fluid. Signs and symptoms of  labor as well as labor were reviewed. The S/S of Pre-Eclampsia were reviewed with the patient in detail. She is to report any of these if they occur. She currently denies any of these. Pt has full body itching which she relates to dry skin. Will check CMP as well as bile acids. Doubt cholestasis.     The patient had her 28 week labs ordered.  No visits with results within 5 Week(s) from this visit.   Latest known visit with results is:   Admission on 10/03/2024, Discharged on 10/03/2024   Component Date Value Ref Range Status    Specimen Description 10/03/2024 .CLEAN CATCH URINE   Final    Special Requests 10/03/2024 Site: Urine   Final    Culture 10/03/2024 NO GROWTH   Final    Color, UA 10/03/2024 Yellow  Yellow Final    Turbidity UA 10/03/2024 Clear  Clear Final    Glucose, Ur 10/03/2024 NEGATIVE  NEGATIVE mg/dL Final    Bilirubin, Urine 10/03/2024 NEGATIVE  NEGATIVE Final    Ketones, Urine 10/03/2024 NEGATIVE  NEGATIVE mg/dL Final    Specific Gravity, UA 10/03/2024 1.010  1.005 - 1.030 Final    Urine Hgb 10/03/2024 NEGATIVE  NEGATIVE Final    pH, Urine 10/03/2024 6.5  5.0 - 8.0 Final    Protein, UA 10/03/2024 NEGATIVE  NEGATIVE mg/dL Final    Urobilinogen, Urine 10/03/2024 Normal  0.0 - 1.0 EU/dL Final    Nitrite, Urine 10/03/2024 NEGATIVE  NEGATIVE Final    Leukocyte Esterase, Urine 10/03/2024 NEGATIVE  NEGATIVE Final    WBC, UA 10/03/2024 0 TO 2  0

## 2024-11-21 ENCOUNTER — OFFICE VISIT (OUTPATIENT)
Dept: BEHAVIORAL/MENTAL HEALTH CLINIC | Age: 24
End: 2024-11-21
Payer: COMMERCIAL

## 2024-11-21 DIAGNOSIS — F43.10 POST TRAUMATIC STRESS DISORDER (PTSD): Primary | ICD-10-CM

## 2024-11-21 DIAGNOSIS — F33.1 MAJOR DEPRESSIVE DISORDER, RECURRENT EPISODE, MODERATE DEGREE (HCC): ICD-10-CM

## 2024-11-21 DIAGNOSIS — F41.1 GENERALIZED ANXIETY DISORDER: ICD-10-CM

## 2024-11-21 PROCEDURE — 90791 PSYCH DIAGNOSTIC EVALUATION: CPT | Performed by: COUNSELOR

## 2024-11-21 ASSESSMENT — PATIENT HEALTH QUESTIONNAIRE - PHQ9
8. MOVING OR SPEAKING SO SLOWLY THAT OTHER PEOPLE COULD HAVE NOTICED. OR THE OPPOSITE, BEING SO FIGETY OR RESTLESS THAT YOU HAVE BEEN MOVING AROUND A LOT MORE THAN USUAL: NEARLY EVERY DAY
7. TROUBLE CONCENTRATING ON THINGS, SUCH AS READING THE NEWSPAPER OR WATCHING TELEVISION: NEARLY EVERY DAY
SUM OF ALL RESPONSES TO PHQ9 QUESTIONS 1 & 2: 4
6. FEELING BAD ABOUT YOURSELF - OR THAT YOU ARE A FAILURE OR HAVE LET YOURSELF OR YOUR FAMILY DOWN: MORE THAN HALF THE DAYS
5. POOR APPETITE OR OVEREATING: MORE THAN HALF THE DAYS
SUM OF ALL RESPONSES TO PHQ QUESTIONS 1-9: 20
9. THOUGHTS THAT YOU WOULD BE BETTER OFF DEAD, OR OF HURTING YOURSELF: SEVERAL DAYS
SUM OF ALL RESPONSES TO PHQ QUESTIONS 1-9: 19
1. LITTLE INTEREST OR PLEASURE IN DOING THINGS: MORE THAN HALF THE DAYS
SUM OF ALL RESPONSES TO PHQ QUESTIONS 1-9: 20
SUM OF ALL RESPONSES TO PHQ QUESTIONS 1-9: 20
10. IF YOU CHECKED OFF ANY PROBLEMS, HOW DIFFICULT HAVE THESE PROBLEMS MADE IT FOR YOU TO DO YOUR WORK, TAKE CARE OF THINGS AT HOME, OR GET ALONG WITH OTHER PEOPLE: SOMEWHAT DIFFICULT
4. FEELING TIRED OR HAVING LITTLE ENERGY: NEARLY EVERY DAY
3. TROUBLE FALLING OR STAYING ASLEEP: MORE THAN HALF THE DAYS
2. FEELING DOWN, DEPRESSED OR HOPELESS: MORE THAN HALF THE DAYS

## 2024-11-21 ASSESSMENT — ANXIETY QUESTIONNAIRES
IF YOU CHECKED OFF ANY PROBLEMS ON THIS QUESTIONNAIRE, HOW DIFFICULT HAVE THESE PROBLEMS MADE IT FOR YOU TO DO YOUR WORK, TAKE CARE OF THINGS AT HOME, OR GET ALONG WITH OTHER PEOPLE: SOMEWHAT DIFFICULT
7. FEELING AFRAID AS IF SOMETHING AWFUL MIGHT HAPPEN: MORE THAN HALF THE DAYS
GAD7 TOTAL SCORE: 20
2. NOT BEING ABLE TO STOP OR CONTROL WORRYING: NEARLY EVERY DAY
5. BEING SO RESTLESS THAT IT IS HARD TO SIT STILL: NEARLY EVERY DAY
6. BECOMING EASILY ANNOYED OR IRRITABLE: NEARLY EVERY DAY
4. TROUBLE RELAXING: NEARLY EVERY DAY
1. FEELING NERVOUS, ANXIOUS, OR ON EDGE: NEARLY EVERY DAY
3. WORRYING TOO MUCH ABOUT DIFFERENT THINGS: NEARLY EVERY DAY

## 2024-11-21 ASSESSMENT — COLUMBIA-SUICIDE SEVERITY RATING SCALE - C-SSRS
1. WITHIN THE PAST MONTH, HAVE YOU WISHED YOU WERE DEAD OR WISHED YOU COULD GO TO SLEEP AND NOT WAKE UP?: YES
6. HAVE YOU EVER DONE ANYTHING, STARTED TO DO ANYTHING, OR PREPARED TO DO ANYTHING TO END YOUR LIFE?: NO
2. HAVE YOU ACTUALLY HAD ANY THOUGHTS OF KILLING YOURSELF?: NO
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
## 2024-11-22 ENCOUNTER — HOSPITAL ENCOUNTER (OUTPATIENT)
Age: 24
Discharge: HOME | End: 2024-11-22
Payer: COMMERCIAL

## 2024-11-22 DIAGNOSIS — O09.93 HIGH-RISK PREGNANCY IN THIRD TRIMESTER: ICD-10-CM

## 2024-11-22 DIAGNOSIS — L50.9 URTICARIA: ICD-10-CM

## 2024-11-22 DIAGNOSIS — Z3A.28 28 WEEKS GESTATION OF PREGNANCY: ICD-10-CM

## 2024-11-22 LAB
ALBUMIN SERPL-MCNC: 4 G/DL (ref 3.5–5.2)
ALBUMIN/GLOB SERPL: 1.5 {RATIO} (ref 1–2.5)
ALP SERPL-CCNC: 136 U/L (ref 35–104)
ALT SERPL-CCNC: 22 U/L (ref 10–35)
AMORPH SED URNS QL MICRO: ABNORMAL
ANION GAP SERPL CALCULATED.3IONS-SCNC: 11 MMOL/L (ref 9–16)
AST SERPL-CCNC: 24 U/L (ref 10–35)
BACTERIA URNS QL MICRO: ABNORMAL
BASOPHILS # BLD: 0 K/UL (ref 0–0.2)
BASOPHILS NFR BLD: 0 % (ref 0–2)
BILIRUB SERPL-MCNC: <0.2 MG/DL (ref 0–1.2)
BILIRUB UR QL STRIP: NEGATIVE
BUN SERPL-MCNC: 7 MG/DL (ref 6–20)
CALCIUM SERPL-MCNC: 8.8 MG/DL (ref 8.6–10.4)
CASTS #/AREA URNS LPF: ABNORMAL /LPF (ref 0–2)
CASTS #/AREA URNS LPF: ABNORMAL /LPF (ref 0–2)
CHLORIDE SERPL-SCNC: 102 MMOL/L (ref 98–107)
CLARITY UR: ABNORMAL
CO2 SERPL-SCNC: 22 MMOL/L (ref 20–31)
COLOR UR: YELLOW
CREAT SERPL-MCNC: 0.6 MG/DL (ref 0.6–0.9)
EOSINOPHIL # BLD: 0.13 K/UL (ref 0–0.4)
EOSINOPHILS RELATIVE PERCENT: 1 % (ref 1–4)
EPI CELLS #/AREA URNS HPF: ABNORMAL /HPF (ref 0–5)
ERYTHROCYTE [DISTWIDTH] IN BLOOD BY AUTOMATED COUNT: 12.5 % (ref 11.8–14.4)
GFR, ESTIMATED: >90 ML/MIN/1.73M2
GLUCOSE 3H P 100 G GLC PO SERPL-MCNC: 121 MG/DL
GLUCOSE SERPL-MCNC: 120 MG/DL (ref 74–99)
GLUCOSE UR STRIP-MCNC: NEGATIVE MG/DL
HCT VFR BLD AUTO: 37.6 % (ref 36.3–47.1)
HGB BLD-MCNC: 12.3 G/DL (ref 11.9–15.1)
HGB UR QL STRIP.AUTO: NEGATIVE
IMM GRANULOCYTES # BLD AUTO: 0.63 K/UL (ref 0–0.3)
IMM GRANULOCYTES NFR BLD: 5 %
KETONES UR STRIP-MCNC: NEGATIVE MG/DL
LEUKOCYTE ESTERASE UR QL STRIP: ABNORMAL
LYMPHOCYTES NFR BLD: 1.89 K/UL (ref 1–4.8)
LYMPHOCYTES RELATIVE PERCENT: 15 % (ref 24–44)
MCH RBC QN AUTO: 27.7 PG (ref 25.2–33.5)
MCHC RBC AUTO-ENTMCNC: 32.7 G/DL (ref 28.4–34.8)
MCV RBC AUTO: 84.7 FL (ref 82.6–102.9)
MONOCYTES NFR BLD: 0.25 K/UL (ref 0.1–0.8)
MONOCYTES NFR BLD: 2 % (ref 1–7)
MORPHOLOGY: NORMAL
NEUTROPHILS NFR BLD: 77 % (ref 36–66)
NEUTS SEG NFR BLD: 9.7 K/UL (ref 1.8–7.7)
NITRITE UR QL STRIP: NEGATIVE
NRBC BLD-RTO: 0 PER 100 WBC
PH UR STRIP: 6 [PH] (ref 5–8)
PLATELET # BLD AUTO: 211 K/UL (ref 138–453)
PMV BLD AUTO: 10.4 FL (ref 8.1–13.5)
POTASSIUM SERPL-SCNC: 3.7 MMOL/L (ref 3.7–5.3)
PROT SERPL-MCNC: 6.7 G/DL (ref 6.6–8.7)
PROT UR STRIP-MCNC: NEGATIVE MG/DL
RBC # BLD AUTO: 4.44 M/UL (ref 3.95–5.11)
RBC #/AREA URNS HPF: ABNORMAL /HPF (ref 0–2)
SODIUM SERPL-SCNC: 135 MMOL/L (ref 136–145)
SP GR UR STRIP: 1.02 (ref 1–1.03)
UROBILINOGEN UR STRIP-ACNC: NORMAL EU/DL (ref 0–1)
WBC #/AREA URNS HPF: ABNORMAL /HPF (ref 0–5)
WBC OTHER # BLD: 12.6 K/UL (ref 3.5–11.3)

## 2024-11-22 PROCEDURE — 85025 COMPLETE CBC W/AUTO DIFF WBC: CPT

## 2024-11-22 PROCEDURE — 80053 COMPREHEN METABOLIC PANEL: CPT

## 2024-11-22 PROCEDURE — 87086 URINE CULTURE/COLONY COUNT: CPT

## 2024-11-22 PROCEDURE — 81001 URINALYSIS AUTO W/SCOPE: CPT

## 2024-11-22 PROCEDURE — 82239 BILE ACIDS TOTAL: CPT

## 2024-11-22 PROCEDURE — 36415 COLL VENOUS BLD VENIPUNCTURE: CPT

## 2024-11-23 LAB
BILE AC SERPL-SCNC: 4 UMOL/L (ref 0–10)
MICROORGANISM SPEC CULT: NORMAL
SPECIMEN DESCRIPTION: NORMAL

## 2024-11-26 ENCOUNTER — ROUTINE PRENATAL (OUTPATIENT)
Dept: OBGYN CLINIC | Age: 24
End: 2024-11-26

## 2024-11-26 VITALS
DIASTOLIC BLOOD PRESSURE: 64 MMHG | WEIGHT: 167 LBS | BODY MASS INDEX: 28.67 KG/M2 | HEART RATE: 97 BPM | SYSTOLIC BLOOD PRESSURE: 108 MMHG

## 2024-11-26 DIAGNOSIS — Z3A.30 30 WEEKS GESTATION OF PREGNANCY: ICD-10-CM

## 2024-11-26 DIAGNOSIS — O09.93 HIGH-RISK PREGNANCY IN THIRD TRIMESTER: Primary | ICD-10-CM

## 2024-11-26 PROBLEM — Z3A.22 22 WEEKS GESTATION OF PREGNANCY: Status: RESOLVED | Noted: 2024-10-04 | Resolved: 2024-11-26

## 2024-11-26 PROCEDURE — 1036F TOBACCO NON-USER: CPT | Performed by: NURSE PRACTITIONER

## 2024-11-26 PROCEDURE — G8484 FLU IMMUNIZE NO ADMIN: HCPCS | Performed by: NURSE PRACTITIONER

## 2024-11-26 PROCEDURE — 0502F SUBSEQUENT PRENATAL CARE: CPT | Performed by: NURSE PRACTITIONER

## 2024-11-26 PROCEDURE — G8427 DOCREV CUR MEDS BY ELIG CLIN: HCPCS | Performed by: NURSE PRACTITIONER

## 2024-11-26 PROCEDURE — G8419 CALC BMI OUT NRM PARAM NOF/U: HCPCS | Performed by: NURSE PRACTITIONER

## 2024-11-26 RX ORDER — NITROFURANTOIN 25; 75 MG/1; MG/1
100 CAPSULE ORAL 2 TIMES DAILY
Qty: 20 CAPSULE | Refills: 0 | Status: CANCELLED | OUTPATIENT
Start: 2024-11-26 | End: 2024-12-06

## 2024-11-26 NOTE — PROGRESS NOTES
121    Glucose Fasting: **  1 hour Glucose Tolerance Test: **  2 hour Glucose Tolerance Test: **  3 hour Glucose Tolerance Test: **    Group B Strep:      Cystic Fibrosis Screen:  DECLINED  First Trimester Screen:  24  Quad Screen:  24  Anatomy US: 24    Tdap: Declined   Flu shot:   COVID Shot:      T-Dap Vaccine (27-36 weeks): declined    RSV Vaccine (32-36 weeks): awaiting    The patient was instructed on fetal kick counts and was given a kick sheet to complete every 8 hours. She was instructed that the baby should move at a minimum of ten times within one hour after a meal. The patient was instructed to lay down on her left side twenty minutes after eating and count movements for up to one hour with a target value of ten movements.  She was instructed to notify the office if she did not make that target after two attempts or if after any attempt there was less than four movements.    The patient reports that the targets have been made Yes.     Testing: ( testing time excludes any time spent for the prenatal visit)  Not indicated     Assessment:  1. Celia Dinero is a 24 y.o. female  2.   3. 30w0d    Patient Active Problem List    Diagnosis Date Noted    Attention deficit hyperactivity disorder (ADHD), combined type 2022     Priority: Medium    Chronic major depressive disorder, recurrent episode (HCC) 2022     Priority: Medium    PTSD (post-traumatic stress disorder) 2022     Priority: Medium    22 weeks gestation of pregnancy 10/04/2024    High risk pregnancy, antepartum 2024    Weight loss 2024    Arrington's neuroma of right foot 2024    Anxiety 2022    Plaque psoriasis 2022        Diagnosis Orders   1. High-risk pregnancy in third trimester        2. 30 weeks gestation of pregnancy        3. PTSD (post-traumatic stress disorder)        4. Chronic major depressive disorder, recurrent episode (HCC)        5. Arringtno's neuroma

## 2024-12-10 ENCOUNTER — ROUTINE PRENATAL (OUTPATIENT)
Dept: OBGYN CLINIC | Age: 24
End: 2024-12-10

## 2024-12-10 VITALS
SYSTOLIC BLOOD PRESSURE: 118 MMHG | DIASTOLIC BLOOD PRESSURE: 80 MMHG | BODY MASS INDEX: 28.84 KG/M2 | WEIGHT: 168 LBS | HEART RATE: 87 BPM

## 2024-12-10 DIAGNOSIS — Z3A.32 32 WEEKS GESTATION OF PREGNANCY: ICD-10-CM

## 2024-12-10 DIAGNOSIS — F43.10 PTSD (POST-TRAUMATIC STRESS DISORDER): ICD-10-CM

## 2024-12-10 DIAGNOSIS — F33.9 CHRONIC MAJOR DEPRESSIVE DISORDER, RECURRENT EPISODE (HCC): ICD-10-CM

## 2024-12-10 DIAGNOSIS — N89.8 VAGINAL IRRITATION: ICD-10-CM

## 2024-12-10 DIAGNOSIS — O09.93 HIGH-RISK PREGNANCY IN THIRD TRIMESTER: Primary | ICD-10-CM

## 2024-12-10 DIAGNOSIS — G57.61 MORTON'S NEUROMA OF RIGHT FOOT: ICD-10-CM

## 2024-12-10 PROCEDURE — 1036F TOBACCO NON-USER: CPT | Performed by: OBSTETRICS & GYNECOLOGY

## 2024-12-10 PROCEDURE — 0502F SUBSEQUENT PRENATAL CARE: CPT | Performed by: OBSTETRICS & GYNECOLOGY

## 2024-12-10 PROCEDURE — G8484 FLU IMMUNIZE NO ADMIN: HCPCS | Performed by: OBSTETRICS & GYNECOLOGY

## 2024-12-10 PROCEDURE — G8427 DOCREV CUR MEDS BY ELIG CLIN: HCPCS | Performed by: OBSTETRICS & GYNECOLOGY

## 2024-12-10 PROCEDURE — G8419 CALC BMI OUT NRM PARAM NOF/U: HCPCS | Performed by: OBSTETRICS & GYNECOLOGY

## 2024-12-10 RX ORDER — FLUCONAZOLE 150 MG/1
150 TABLET ORAL EVERY OTHER DAY
Qty: 2 TABLET | Refills: 0 | Status: SHIPPED | OUTPATIENT
Start: 2024-12-10

## 2024-12-10 RX ORDER — CLOTRIMAZOLE AND BETAMETHASONE DIPROPIONATE 10; .64 MG/G; MG/G
CREAM TOPICAL
Qty: 45 G | Refills: 1 | Status: SHIPPED | OUTPATIENT
Start: 2024-12-10

## 2024-12-10 NOTE — PROGRESS NOTES
.mejia        Celia Dinero is a 24 y.o. female 32w0d        OB History    Para Term  AB Living   2       1     SAB IAB Ectopic Molar Multiple Live Births                    # Outcome Date GA Lbr Fabio/2nd Weight Sex Type Anes PTL Lv   2 Current            1 AB                Vitals  BP: 118/80  Weight - Scale: 76.2 kg (168 lb)  Pulse: 87  Patient Position: Sitting  Albumin: Negative  Glucose: Negative      The patient was seen and evaluated. There was positive fetal movements. No contractions or leakage of fluid. Signs and symptoms of  labor as well as labor were reviewed. The S/S of Pre-Eclampsia were reviewed with the patient in detail. She is to report any of these if they occur. She currently denies any of these.    The patient had her 28 week labs completed.  Hospital Outpatient Visit on 2024   Component Date Value Ref Range Status    Bile acids,total 2024 4  0 - 10 umol/L Final    Comment: (NOTE)  INTERPRETIVE INFORMATION: Bile Acids, Total  Reference Interval applies to fasting specimens.  Performed By: [facility]  [address] [place] [id number]  : Saul Winter MD, PhD  CLIA Number: [id number]      Sodium 2024 135 (L)  136 - 145 mmol/L Final    Potassium 2024 3.7  3.7 - 5.3 mmol/L Final    Chloride 2024 102  98 - 107 mmol/L Final    CO2 2024 22  20 - 31 mmol/L Final    Anion Gap 2024 11  9 - 16 mmol/L Final    Glucose 2024 120 (H)  74 - 99 mg/dL Final    BUN 2024 7  6 - 20 mg/dL Final    Creatinine 2024 0.6  0.6 - 0.9 mg/dL Final    Est, Glom Filt Rate 2024 >90  >60 mL/min/1.73m2 Final    Comment:       These results are not intended for use in patients <18 years of age.        eGFR results are calculated without a race factor using the  CKD-EPI equation.  Careful clinical correlation is recommended, particularly when comparing to results   calculated using previous

## 2024-12-11 ENCOUNTER — OFFICE VISIT (OUTPATIENT)
Dept: BEHAVIORAL/MENTAL HEALTH CLINIC | Age: 24
End: 2024-12-11
Payer: COMMERCIAL

## 2024-12-11 DIAGNOSIS — F33.1 MAJOR DEPRESSIVE DISORDER, RECURRENT EPISODE, MODERATE DEGREE (HCC): ICD-10-CM

## 2024-12-11 DIAGNOSIS — F41.1 GENERALIZED ANXIETY DISORDER: ICD-10-CM

## 2024-12-11 DIAGNOSIS — F43.10 POST TRAUMATIC STRESS DISORDER (PTSD): Primary | ICD-10-CM

## 2024-12-11 PROCEDURE — 90837 PSYTX W PT 60 MINUTES: CPT | Performed by: COUNSELOR

## 2024-12-11 ASSESSMENT — ANXIETY QUESTIONNAIRES
5. BEING SO RESTLESS THAT IT IS HARD TO SIT STILL: MORE THAN HALF THE DAYS
1. FEELING NERVOUS, ANXIOUS, OR ON EDGE: NEARLY EVERY DAY
GAD7 TOTAL SCORE: 18
7. FEELING AFRAID AS IF SOMETHING AWFUL MIGHT HAPPEN: MORE THAN HALF THE DAYS
IF YOU CHECKED OFF ANY PROBLEMS ON THIS QUESTIONNAIRE, HOW DIFFICULT HAVE THESE PROBLEMS MADE IT FOR YOU TO DO YOUR WORK, TAKE CARE OF THINGS AT HOME, OR GET ALONG WITH OTHER PEOPLE: VERY DIFFICULT
6. BECOMING EASILY ANNOYED OR IRRITABLE: NEARLY EVERY DAY
4. TROUBLE RELAXING: MORE THAN HALF THE DAYS
2. NOT BEING ABLE TO STOP OR CONTROL WORRYING: NEARLY EVERY DAY
3. WORRYING TOO MUCH ABOUT DIFFERENT THINGS: NEARLY EVERY DAY

## 2024-12-11 ASSESSMENT — PATIENT HEALTH QUESTIONNAIRE - PHQ9
10. IF YOU CHECKED OFF ANY PROBLEMS, HOW DIFFICULT HAVE THESE PROBLEMS MADE IT FOR YOU TO DO YOUR WORK, TAKE CARE OF THINGS AT HOME, OR GET ALONG WITH OTHER PEOPLE: VERY DIFFICULT
9. THOUGHTS THAT YOU WOULD BE BETTER OFF DEAD, OR OF HURTING YOURSELF: SEVERAL DAYS
SUM OF ALL RESPONSES TO PHQ QUESTIONS 1-9: 20
3. TROUBLE FALLING OR STAYING ASLEEP: NEARLY EVERY DAY
5. POOR APPETITE OR OVEREATING: MORE THAN HALF THE DAYS
SUM OF ALL RESPONSES TO PHQ QUESTIONS 1-9: 21
SUM OF ALL RESPONSES TO PHQ9 QUESTIONS 1 & 2: 4
1. LITTLE INTEREST OR PLEASURE IN DOING THINGS: MORE THAN HALF THE DAYS
SUM OF ALL RESPONSES TO PHQ QUESTIONS 1-9: 21
2. FEELING DOWN, DEPRESSED OR HOPELESS: MORE THAN HALF THE DAYS
SUM OF ALL RESPONSES TO PHQ QUESTIONS 1-9: 21
7. TROUBLE CONCENTRATING ON THINGS, SUCH AS READING THE NEWSPAPER OR WATCHING TELEVISION: NEARLY EVERY DAY
8. MOVING OR SPEAKING SO SLOWLY THAT OTHER PEOPLE COULD HAVE NOTICED. OR THE OPPOSITE, BEING SO FIGETY OR RESTLESS THAT YOU HAVE BEEN MOVING AROUND A LOT MORE THAN USUAL: NEARLY EVERY DAY
4. FEELING TIRED OR HAVING LITTLE ENERGY: NEARLY EVERY DAY
6. FEELING BAD ABOUT YOURSELF - OR THAT YOU ARE A FAILURE OR HAVE LET YOURSELF OR YOUR FAMILY DOWN: MORE THAN HALF THE DAYS

## 2024-12-11 ASSESSMENT — COLUMBIA-SUICIDE SEVERITY RATING SCALE - C-SSRS
6. HAVE YOU EVER DONE ANYTHING, STARTED TO DO ANYTHING, OR PREPARED TO DO ANYTHING TO END YOUR LIFE?: NO
2. HAVE YOU ACTUALLY HAD ANY THOUGHTS OF KILLING YOURSELF?: NO
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
## 2024-12-23 ENCOUNTER — HOSPITAL ENCOUNTER (OUTPATIENT)
Age: 24
Setting detail: OBSERVATION
Discharge: HOME | End: 2024-12-23
Attending: OBSTETRICS & GYNECOLOGY | Admitting: OBSTETRICS & GYNECOLOGY
Payer: COMMERCIAL

## 2024-12-23 ENCOUNTER — TELEPHONE (OUTPATIENT)
Dept: OBGYN CLINIC | Age: 24
End: 2024-12-23

## 2024-12-23 VITALS
DIASTOLIC BLOOD PRESSURE: 84 MMHG | HEART RATE: 78 BPM | SYSTOLIC BLOOD PRESSURE: 135 MMHG | BODY MASS INDEX: 28.68 KG/M2 | HEIGHT: 64 IN | RESPIRATION RATE: 19 BRPM | WEIGHT: 168 LBS | TEMPERATURE: 97.6 F | OXYGEN SATURATION: 97 %

## 2024-12-23 PROBLEM — Z3A.33 33 WEEKS GESTATION OF PREGNANCY: Status: ACTIVE | Noted: 2024-12-23

## 2024-12-23 LAB
BACTERIA URNS QL MICRO: NORMAL
BILIRUB UR QL STRIP: NEGATIVE
CANDIDA SPECIES: NEGATIVE
CASTS #/AREA URNS LPF: NORMAL /LPF (ref 0–8)
CLARITY UR: ABNORMAL
COLOR UR: YELLOW
EPI CELLS #/AREA URNS HPF: NORMAL /HPF (ref 0–5)
GARDNERELLA VAGINALIS: NEGATIVE
GLUCOSE UR STRIP-MCNC: NEGATIVE MG/DL
HGB UR QL STRIP.AUTO: ABNORMAL
KETONES UR STRIP-MCNC: ABNORMAL MG/DL
LEUKOCYTE ESTERASE UR QL STRIP: NEGATIVE
NITRITE UR QL STRIP: NEGATIVE
PH UR STRIP: 6.5 [PH] (ref 5–8)
PROT UR STRIP-MCNC: ABNORMAL MG/DL
RBC #/AREA URNS HPF: NORMAL /HPF (ref 0–4)
SOURCE: NORMAL
SP GR UR STRIP: 1.02 (ref 1–1.03)
TRICHOMONAS: NEGATIVE
UROBILINOGEN UR STRIP-ACNC: NORMAL EU/DL (ref 0–1)
WBC #/AREA URNS HPF: NORMAL /HPF (ref 0–5)

## 2024-12-23 PROCEDURE — 81001 URINALYSIS AUTO W/SCOPE: CPT

## 2024-12-23 PROCEDURE — 6370000000 HC RX 637 (ALT 250 FOR IP)

## 2024-12-23 PROCEDURE — 87510 GARDNER VAG DNA DIR PROBE: CPT

## 2024-12-23 PROCEDURE — 87660 TRICHOMONAS VAGIN DIR PROBE: CPT

## 2024-12-23 PROCEDURE — 87491 CHLMYD TRACH DNA AMP PROBE: CPT

## 2024-12-23 PROCEDURE — 87591 N.GONORRHOEAE DNA AMP PROB: CPT

## 2024-12-23 PROCEDURE — 99203 OFFICE O/P NEW LOW 30 MIN: CPT

## 2024-12-23 PROCEDURE — G0379 DIRECT REFER HOSPITAL OBSERV: HCPCS

## 2024-12-23 PROCEDURE — G0378 HOSPITAL OBSERVATION PER HR: HCPCS

## 2024-12-23 PROCEDURE — 87480 CANDIDA DNA DIR PROBE: CPT

## 2024-12-23 RX ORDER — LIDOCAINE 4 G/G
1 PATCH TOPICAL DAILY
Qty: 10 EACH | Refills: 0 | Status: SHIPPED | OUTPATIENT
Start: 2024-12-23 | End: 2025-01-02

## 2024-12-23 RX ORDER — ONDANSETRON 2 MG/ML
4 INJECTION INTRAMUSCULAR; INTRAVENOUS EVERY 6 HOURS PRN
Status: DISCONTINUED | OUTPATIENT
Start: 2024-12-23 | End: 2024-12-23 | Stop reason: HOSPADM

## 2024-12-23 RX ORDER — ACETAMINOPHEN 500 MG
1000 TABLET ORAL EVERY 6 HOURS PRN
Status: DISCONTINUED | OUTPATIENT
Start: 2024-12-23 | End: 2024-12-23 | Stop reason: HOSPADM

## 2024-12-23 RX ORDER — PHENAZOPYRIDINE HYDROCHLORIDE 100 MG/1
100 TABLET, FILM COATED ORAL 3 TIMES DAILY PRN
Qty: 15 TABLET | Refills: 0 | Status: SHIPPED | OUTPATIENT
Start: 2024-12-23 | End: 2025-01-07

## 2024-12-23 RX ORDER — ACETAMINOPHEN 500 MG
1000 TABLET ORAL EVERY 8 HOURS SCHEDULED
Status: DISCONTINUED | OUTPATIENT
Start: 2024-12-23 | End: 2024-12-23

## 2024-12-23 RX ORDER — ONDANSETRON 4 MG/1
4 TABLET, ORALLY DISINTEGRATING ORAL EVERY 8 HOURS PRN
Status: DISCONTINUED | OUTPATIENT
Start: 2024-12-23 | End: 2024-12-23 | Stop reason: HOSPADM

## 2024-12-23 RX ADMIN — ACETAMINOPHEN 1000 MG: 500 TABLET ORAL at 12:10

## 2024-12-23 ASSESSMENT — PAIN DESCRIPTION - LOCATION: LOCATION: ABDOMEN

## 2024-12-23 ASSESSMENT — PAIN SCALES - GENERAL: PAINLEVEL_OUTOF10: 3

## 2024-12-23 ASSESSMENT — PAIN DESCRIPTION - DESCRIPTORS: DESCRIPTORS: CRAMPING

## 2024-12-23 NOTE — FLOWSHEET NOTE
Pt here with c/o abd cramping and urinary frequency and burning.  Denies lof and vag bleeding.  Positive fetal movement.  Pt to triage 1, given gown to change into and urine sample obtained.  Pt placed on EFM and VS's as charted.   notified via perfect serve of pt's arrival and c/o.

## 2024-12-23 NOTE — DISCHARGE INSTRUCTIONS
Notify Physician for:    *  Regular contractions that are  5 minutes apart or less lasting longer than 1 hr for 1st baby, 10 mins apart or less if 2nd baby or greater.    *  Leaking or gush of fluid from vagina.    *  Any vaginal bleeding that is heavier than a menstrual period.    *  Decrease or absence of baby movement.     *  Vaginal pressure, low backache.

## 2024-12-23 NOTE — TELEPHONE ENCOUNTER
Patient called in with concerns of burning and cramping. Patient states was on her way to her appointment and was having severe burning. Cramping on and off for the last week. Patient also states hasn't felt baby move as often as normal. Per Jovita Castano patient to present to [facility] L&

## 2024-12-23 NOTE — H&P
OBSTETRICAL HISTORY AND PHYSICAL  [facility]    Date: 2024       Time: 1:47 PM   Patient Name: Celia Dinero     Patient : [id number]  Room/Bed: [facility] [address]    Admission Date/Time: 2024 11:15 AM      CC: Lower abdominal cramping, UTI symptoms     HPI: Ceila Dinero is a 24 y.o.  at 33w6d who presents for lower abdominal cramping and increased urinary frequency/dysuria for the last couple days that feels like her previous UTIs.     The patient reports fetal movement is present, denies contractions, denies loss of fluid, denies vaginal bleeding. Patient denies headache, vision changes, nausea, vomiting, fever, chills, shortness of breath, chest pain, RUQ pain, abdominal pain, diarrhea, change in color/amount/odor of vaginal discharge, dysuria or, hematuria.       DATING:  LMP: Patient's last menstrual period was 03/15/2024.  Estimated Date of Delivery: 25   Based on: early ultrasound, at 6 6/7 weeks GA    PREGNANCY RISK FACTORS:  Patient Active Problem List   Diagnosis    Anxiety    Plaque psoriasis    PTSD (post-traumatic stress disorder)    Chronic major depressive disorder, recurrent episode (HCC)    Attention deficit hyperactivity disorder (ADHD), combined type    Weight loss    Arrington's neuroma of right foot    High risk pregnancy, antepartum    33 weeks gestation of pregnancy        Steroids Given In This Pregnancy:  no     REVIEW OF SYSTEMS:   Constitutional: negative fever, negative chills, negative weight changes   HEENT: negative visual disturbances, negative headaches, negative dizziness, negative hearing loss  Breast: Negative breast abnormalities, negative breast lumps, negative nipple discharge  Respiratory: negative dyspnea, negative cough, negative SOB  Cardiovascular: negative chest pain,  negative palpitations, negative arrhythmia, negative syncope   Gastrointestinal: lower abdominal cramping, negative RUQ pain, negative N/V, negative

## 2024-12-24 LAB
C TRACH DNA SPEC QL PROBE+SIG AMP: NEGATIVE
N GONORRHOEA DNA SPEC QL PROBE+SIG AMP: NEGATIVE
SPECIMEN DESCRIPTION: NORMAL

## 2025-01-02 PROBLEM — Z3A.33 33 WEEKS GESTATION OF PREGNANCY: Status: RESOLVED | Noted: 2024-12-23 | Resolved: 2025-01-02

## 2025-01-07 ENCOUNTER — ROUTINE PRENATAL (OUTPATIENT)
Dept: OBGYN CLINIC | Age: 25
End: 2025-01-07

## 2025-01-07 ENCOUNTER — HOSPITAL ENCOUNTER (OUTPATIENT)
Age: 25
Setting detail: SPECIMEN
Discharge: HOME | End: 2025-01-07

## 2025-01-07 ENCOUNTER — OFFICE VISIT (OUTPATIENT)
Dept: BEHAVIORAL/MENTAL HEALTH CLINIC | Age: 25
End: 2025-01-07
Payer: COMMERCIAL

## 2025-01-07 VITALS
SYSTOLIC BLOOD PRESSURE: 116 MMHG | HEART RATE: 98 BPM | DIASTOLIC BLOOD PRESSURE: 78 MMHG | WEIGHT: 174 LBS | BODY MASS INDEX: 29.87 KG/M2

## 2025-01-07 DIAGNOSIS — F41.1 GENERALIZED ANXIETY DISORDER: ICD-10-CM

## 2025-01-07 DIAGNOSIS — F43.10 PTSD (POST-TRAUMATIC STRESS DISORDER): ICD-10-CM

## 2025-01-07 DIAGNOSIS — G57.61 MORTON'S NEUROMA OF RIGHT FOOT: ICD-10-CM

## 2025-01-07 DIAGNOSIS — F33.1 MAJOR DEPRESSIVE DISORDER, RECURRENT EPISODE, MODERATE DEGREE (HCC): ICD-10-CM

## 2025-01-07 DIAGNOSIS — Z3A.36 36 WEEKS GESTATION OF PREGNANCY: ICD-10-CM

## 2025-01-07 DIAGNOSIS — F33.9 CHRONIC MAJOR DEPRESSIVE DISORDER, RECURRENT EPISODE (HCC): ICD-10-CM

## 2025-01-07 DIAGNOSIS — O09.93 HIGH-RISK PREGNANCY IN THIRD TRIMESTER: Primary | ICD-10-CM

## 2025-01-07 DIAGNOSIS — F43.10 POST TRAUMATIC STRESS DISORDER (PTSD): Primary | ICD-10-CM

## 2025-01-07 DIAGNOSIS — O09.93 HIGH-RISK PREGNANCY IN THIRD TRIMESTER: ICD-10-CM

## 2025-01-07 PROCEDURE — 0502F SUBSEQUENT PRENATAL CARE: CPT | Performed by: NURSE PRACTITIONER

## 2025-01-07 PROCEDURE — 90837 PSYTX W PT 60 MINUTES: CPT | Performed by: COUNSELOR

## 2025-01-07 ASSESSMENT — ANXIETY QUESTIONNAIRES
GAD7 TOTAL SCORE: 18
6. BECOMING EASILY ANNOYED OR IRRITABLE: NEARLY EVERY DAY
1. FEELING NERVOUS, ANXIOUS, OR ON EDGE: NEARLY EVERY DAY
IF YOU CHECKED OFF ANY PROBLEMS ON THIS QUESTIONNAIRE, HOW DIFFICULT HAVE THESE PROBLEMS MADE IT FOR YOU TO DO YOUR WORK, TAKE CARE OF THINGS AT HOME, OR GET ALONG WITH OTHER PEOPLE: VERY DIFFICULT
5. BEING SO RESTLESS THAT IT IS HARD TO SIT STILL: NEARLY EVERY DAY
7. FEELING AFRAID AS IF SOMETHING AWFUL MIGHT HAPPEN: SEVERAL DAYS
3. WORRYING TOO MUCH ABOUT DIFFERENT THINGS: NEARLY EVERY DAY
4. TROUBLE RELAXING: MORE THAN HALF THE DAYS
2. NOT BEING ABLE TO STOP OR CONTROL WORRYING: NEARLY EVERY DAY

## 2025-01-07 ASSESSMENT — PATIENT HEALTH QUESTIONNAIRE - PHQ9
SUM OF ALL RESPONSES TO PHQ9 QUESTIONS 1 & 2: 4
SUM OF ALL RESPONSES TO PHQ QUESTIONS 1-9: 21
5. POOR APPETITE OR OVEREATING: MORE THAN HALF THE DAYS
SUM OF ALL RESPONSES TO PHQ QUESTIONS 1-9: 20
10. IF YOU CHECKED OFF ANY PROBLEMS, HOW DIFFICULT HAVE THESE PROBLEMS MADE IT FOR YOU TO DO YOUR WORK, TAKE CARE OF THINGS AT HOME, OR GET ALONG WITH OTHER PEOPLE: VERY DIFFICULT
8. MOVING OR SPEAKING SO SLOWLY THAT OTHER PEOPLE COULD HAVE NOTICED. OR THE OPPOSITE, BEING SO FIGETY OR RESTLESS THAT YOU HAVE BEEN MOVING AROUND A LOT MORE THAN USUAL: NEARLY EVERY DAY
3. TROUBLE FALLING OR STAYING ASLEEP: MORE THAN HALF THE DAYS
7. TROUBLE CONCENTRATING ON THINGS, SUCH AS READING THE NEWSPAPER OR WATCHING TELEVISION: NEARLY EVERY DAY
6. FEELING BAD ABOUT YOURSELF - OR THAT YOU ARE A FAILURE OR HAVE LET YOURSELF OR YOUR FAMILY DOWN: NEARLY EVERY DAY
SUM OF ALL RESPONSES TO PHQ QUESTIONS 1-9: 21
4. FEELING TIRED OR HAVING LITTLE ENERGY: NEARLY EVERY DAY
SUM OF ALL RESPONSES TO PHQ QUESTIONS 1-9: 21
9. THOUGHTS THAT YOU WOULD BE BETTER OFF DEAD, OR OF HURTING YOURSELF: SEVERAL DAYS
1. LITTLE INTEREST OR PLEASURE IN DOING THINGS: MORE THAN HALF THE DAYS
2. FEELING DOWN, DEPRESSED OR HOPELESS: MORE THAN HALF THE DAYS

## 2025-01-07 ASSESSMENT — COLUMBIA-SUICIDE SEVERITY RATING SCALE - C-SSRS
6. HAVE YOU EVER DONE ANYTHING, STARTED TO DO ANYTHING, OR PREPARED TO DO ANYTHING TO END YOUR LIFE?: NO
2. HAVE YOU ACTUALLY HAD ANY THOUGHTS OF KILLING YOURSELF?: NO
1. WITHIN THE PAST MONTH, HAVE YOU WISHED YOU WERE DEAD OR WISHED YOU COULD GO TO SLEEP AND NOT WAKE UP?: YES

## 2025-01-07 NOTE — PROGRESS NOTES
nucleic acid amplification.        This test is intended for medical purposes only and is not valid for the evaluation of   suspected sexual abuse or for other forensic purposes.  In certain contexts, culture may be required to meet applicable laws and regulations for   diagnosis of C. trachomatis and N. gonorrhoeae infections.  Per 2014  CDC recommendations, this test does not include confirmation of positive results   by an alternative nucleic acid target.      Color, UA 12/23/2024 Yellow  Yellow Final    Turbidity UA 12/23/2024 Cloudy (A)  Clear Final    Glucose, Ur 12/23/2024 NEGATIVE  NEGATIVE mg/dL Final    Bilirubin, Urine 12/23/2024 NEGATIVE  NEGATIVE Final    Ketones, Urine 12/23/2024 TRACE (A)  NEGATIVE mg/dL Final    Specific Gravity, UA 12/23/2024 1.020  1.005 - 1.030 Final    Urine Hgb 12/23/2024 MODERATE (A)  NEGATIVE Final    pH, Urine 12/23/2024 6.5  5.0 - 8.0 Final    Protein, UA 12/23/2024 2+ (A)  NEGATIVE mg/dL Final    Urobilinogen, Urine 12/23/2024 Normal  0.0 - 1.0 EU/dL Final    Nitrite, Urine 12/23/2024 NEGATIVE  NEGATIVE Final    Leukocyte Esterase, Urine 12/23/2024 NEGATIVE  NEGATIVE Final    WBC, UA 12/23/2024 2 TO 5  0 - 5 /HPF Final    RBC, UA 12/23/2024 TOO NUMEROUS TO COUNT  0 - 4 /HPF Final    Reference range defined for non-centrifuged specimen.    Casts UA 12/23/2024 0 TO 2 HYALINE Reference range defined for non-centrifuged specimen.  0 - 8 /LPF Final    Epithelial Cells, UA 12/23/2024 2 TO 5  0 - 5 /HPF Final    Bacteria, UA 12/23/2024 None  None Final   ]  Sensitivities for clindamycin and erythromycin were ordered. Yes      The patient was counseled on the mandatory call ahead policy. She has been instructed to call the office at anytime prior to going into the hospital so the on-call physician may direct her to the appropriate facility for care. Exceptions were reviewed including but not limited to: Decreased fetal movement, vaginal Bleeding or hemorrhage, trauma, readily

## 2025-01-07 NOTE — PROGRESS NOTES
ideation was denied.   Hygiene was good .  Dress was appropriate.   Behavior was Within Normal Limits with No observation of difficulties ambulating.   Attitude was Cooperative and Engageable.  Eye-contact was good.  Speech: rate - WNL, rhythm - WNL, volume - WNL.  Verbalizations were goal directed and coherent.  Thought processes were intact and goal-oriented without evidence of delusions, hallucinations, obsessions, or segundo; with moderate cognitive distortions.   Associations were characterized by intact cognitive processes.  Patient was oriented to person, place, time, and general circumstances;  recent:  good and remote:  good.  Insight and judgment were estimated to be fair, AEB, a fair  understanding of cyclical maladaptive patterns, and the ability to use insight to inform behavior change.   Attention span and concentration appear within functional limits  Language use and knowledge base appear within functional limits        ASSESSMENT  Celia presented to the appointment today for evaluation and treatment of symptoms of PTSD, anxiety, and depression. She is currently deemed a low risk to herself or others and meets criteria for F43.10 Post traumatic stress disorder (PTSD), F33.1 Major depressive disorder, recurrent episode, moderate degree (HCC), and F41.1 Generalized anxiety disorder. Celia was in agreement with recommendations.          12/11/2024     2:17 PM 11/21/2024     2:09 PM 6/20/2024    12:09 PM 6/6/2024    11:07 AM 5/30/2024    10:06 AM 4/7/2022     4:12 PM   PHQ Scores   PHQ2 Score 4 4 3 2 2 5   PHQ9 Score 21 20 18 16 2 20     Interpretation of Total Score Depression Severity: 1-4 = Minimal depression, 5-9 = Mild depression, 10-14 = Moderate depression, 15-19 = Moderately severe depression, 20-27 = Severe depression    How often pt has had thoughts of death or hurting self (if PHQ positive for depression):           12/11/2024     2:17 PM 11/21/2024     2:11 PM 11/3/2024     8:46 AM 4/7/2022

## 2025-01-11 LAB
MICROORGANISM SPEC CULT: ABNORMAL
SERVICE CMNT-IMP: ABNORMAL
SPECIMEN DESCRIPTION: ABNORMAL

## 2025-01-13 PROBLEM — O99.820 GBS (GROUP B STREPTOCOCCUS CARRIER), +RV CULTURE, CURRENTLY PREGNANT: Status: ACTIVE | Noted: 2025-01-13

## 2025-01-16 ENCOUNTER — ROUTINE PRENATAL (OUTPATIENT)
Dept: OBGYN CLINIC | Age: 25
End: 2025-01-16

## 2025-01-16 VITALS
HEART RATE: 82 BPM | BODY MASS INDEX: 29.52 KG/M2 | WEIGHT: 172 LBS | DIASTOLIC BLOOD PRESSURE: 72 MMHG | SYSTOLIC BLOOD PRESSURE: 128 MMHG

## 2025-01-16 DIAGNOSIS — Z3A.37 37 WEEKS GESTATION OF PREGNANCY: ICD-10-CM

## 2025-01-16 DIAGNOSIS — O99.820 GBS (GROUP B STREPTOCOCCUS CARRIER), +RV CULTURE, CURRENTLY PREGNANT: Primary | ICD-10-CM

## 2025-01-16 PROCEDURE — 0502F SUBSEQUENT PRENATAL CARE: CPT | Performed by: OBSTETRICS & GYNECOLOGY

## 2025-01-16 SDOH — ECONOMIC STABILITY: FOOD INSECURITY: WITHIN THE PAST 12 MONTHS, THE FOOD YOU BOUGHT JUST DIDN'T LAST AND YOU DIDN'T HAVE MONEY TO GET MORE.: NEVER TRUE

## 2025-01-16 SDOH — ECONOMIC STABILITY: INCOME INSECURITY: IN THE LAST 12 MONTHS, WAS THERE A TIME WHEN YOU WERE NOT ABLE TO PAY THE MORTGAGE OR RENT ON TIME?: NO

## 2025-01-16 SDOH — ECONOMIC STABILITY: TRANSPORTATION INSECURITY
IN THE PAST 12 MONTHS, HAS LACK OF TRANSPORTATION KEPT YOU FROM MEETINGS, WORK, OR FROM GETTING THINGS NEEDED FOR DAILY LIVING?: NO

## 2025-01-16 SDOH — ECONOMIC STABILITY: FOOD INSECURITY: WITHIN THE PAST 12 MONTHS, YOU WORRIED THAT YOUR FOOD WOULD RUN OUT BEFORE YOU GOT MONEY TO BUY MORE.: NEVER TRUE

## 2025-01-16 SDOH — ECONOMIC STABILITY: TRANSPORTATION INSECURITY
IN THE PAST 12 MONTHS, HAS THE LACK OF TRANSPORTATION KEPT YOU FROM MEDICAL APPOINTMENTS OR FROM GETTING MEDICATIONS?: NO

## 2025-01-16 NOTE — PROGRESS NOTES
expectant delivery, or any instance where she feels 911 should be utilized.      The patient was counseled on Labor & Delivery.   Route of delivery and counseling on vaginal, operative vaginal, and  sections were completed with the risks of each to both the patient as well as her baby. The possibility of a blood transfusion was discussed as well. The patient was not opposed to receiving a transfusion if needed. The patient was counseled on types of analgesia during labor and is considering either Regional or IV medication the risks, benefits and alternatives were discussed.      Testing:  Not indicated  The recommendation to proceed to fetal kick counts every 8 hours daily instead of Non stress testing, (as per Vasyl RC, Sangeetha G, Olman F, Irvin BEJARANO, [facility] guidance for Covid-19 testing, American Journal of Obstetrics & Gynecology, 2020)         Assessment:  1. Celia Dinero is a 24 y.o. female  2.   3. 37w2d    Patient Active Problem List    Diagnosis Date Noted    GBS (group B Streptococcus carrier), +RV culture, currently pregnant 2025     Priority: High     Overview Note:     2025 treat in labor per protocol      Attention deficit hyperactivity disorder (ADHD), combined type 2022     Priority: Medium    Chronic major depressive disorder, recurrent episode (HCC) 2022     Priority: Medium    PTSD (post-traumatic stress disorder) 2022     Priority: Medium    High risk pregnancy, antepartum 2024    Weight loss 2024    Arrington's neuroma of right foot 2024    Anxiety 2022    Plaque psoriasis 2022        Diagnosis Orders   1. GBS (group B Streptococcus carrier), +RV culture, currently pregnant        2. 37 weeks gestation of pregnancy                Plan:  The patient will return to the office for her next visit in 1 weeks. See antepartum flow sheet.       The patient, Celia Dinero is a 24 y.o. female, was seen with a total time

## 2025-01-22 ENCOUNTER — ROUTINE PRENATAL (OUTPATIENT)
Dept: OBGYN CLINIC | Age: 25
End: 2025-01-22

## 2025-01-22 VITALS
WEIGHT: 174 LBS | BODY MASS INDEX: 29.87 KG/M2 | DIASTOLIC BLOOD PRESSURE: 70 MMHG | SYSTOLIC BLOOD PRESSURE: 112 MMHG | HEART RATE: 80 BPM

## 2025-01-22 DIAGNOSIS — O99.820 GBS (GROUP B STREPTOCOCCUS CARRIER), +RV CULTURE, CURRENTLY PREGNANT: ICD-10-CM

## 2025-01-22 DIAGNOSIS — Z3A.38 38 WEEKS GESTATION OF PREGNANCY: ICD-10-CM

## 2025-01-22 DIAGNOSIS — O09.93 HIGH-RISK PREGNANCY IN THIRD TRIMESTER: Primary | ICD-10-CM

## 2025-01-22 PROCEDURE — 0502F SUBSEQUENT PRENATAL CARE: CPT | Performed by: NURSE PRACTITIONER

## 2025-01-22 NOTE — PROGRESS NOTES
Celia Dinero is a 24 y.o. female 38w1d        OB History    Para Term  AB Living   2       1     SAB IAB Ectopic Molar Multiple Live Births                    # Outcome Date GA Lbr Fabio/2nd Weight Sex Type Anes PTL Lv   2 Current            1 AB                Vitals  BP: 112/70  Weight - Scale: 78.9 kg (174 lb)  Pulse: 80  Patient Position: Sitting  Albumin: Negative  Glucose: Negative      The patient was seen and evaluated. There was positive fetal movements. No contractions or leakage of fluid. Signs and symptoms of labor were reviewed.  The S/S of Pre-Eclampsia were reviewed with the patient in detail. She is to report any of these if they occur. She currently denies any of these.    The patient was instructed on fetal kick counts and was given a kick sheet to complete every 8 hours. She was instructed that the baby should move at a minimum of ten times within one hour after a meal. The patient was instructed to lay down on her left side twenty minutes after eating and count movements for up to one hour with a target value of ten movements.  She was instructed to notify the office if she did not make that target after two attempts or if after any attempt there was less than four movements.    The patient reports that the targets have been made Yes.      MAGALY by TVUS: 25 by sono 24    High Risk Dx:  +GBS allergy PCN Resistant clindamycin    PRENATAL LAB RESULTS:   Pre-pregnancy: BMI  Blood Type/Rh: O+  Antibody Screen: neg  Hemoglobin, Hematocrit: 13.5/40.6  Rubella: reactive  T. Pallidum, IgG: neg  Hepatitis B Surface Antigen: neg  TSH: 1.78  HIV: neg  Sickle Cell Screen: n/a  Gonorrhea: NEG  Chlamydia:NEG  Urine culture: NEG    Early  hour Glucose Tolerance Test: N/A    28 wk 1 hr     Glucose Fasting: **  1 hour Glucose Tolerance Test: **  2 hour Glucose Tolerance Test: **  3 hour Glucose Tolerance Test: **    Group B Strep:  positive    Cystic Fibrosis Screen:

## 2025-01-29 ENCOUNTER — ROUTINE PRENATAL (OUTPATIENT)
Dept: OBGYN CLINIC | Age: 25
End: 2025-01-29

## 2025-01-29 VITALS
DIASTOLIC BLOOD PRESSURE: 72 MMHG | BODY MASS INDEX: 30.55 KG/M2 | SYSTOLIC BLOOD PRESSURE: 116 MMHG | HEART RATE: 99 BPM | WEIGHT: 178 LBS

## 2025-01-29 DIAGNOSIS — O99.820 GBS (GROUP B STREPTOCOCCUS CARRIER), +RV CULTURE, CURRENTLY PREGNANT: ICD-10-CM

## 2025-01-29 DIAGNOSIS — Z3A.39 39 WEEKS GESTATION OF PREGNANCY: ICD-10-CM

## 2025-01-29 DIAGNOSIS — O09.93 HIGH-RISK PREGNANCY IN THIRD TRIMESTER: Primary | ICD-10-CM

## 2025-01-29 PROCEDURE — 0502F SUBSEQUENT PRENATAL CARE: CPT | Performed by: NURSE PRACTITIONER

## 2025-01-29 NOTE — PROGRESS NOTES
Celia Dinero is a 24 y.o. female 39w1d        OB History    Para Term  AB Living   2       1     SAB IAB Ectopic Molar Multiple Live Births                    # Outcome Date GA Lbr Fabio/2nd Weight Sex Type Anes PTL Lv   2 Current            1 AB                Vitals  BP: 116/72  Weight - Scale: 80.7 kg (178 lb)  Pulse: 99  Patient Position: Sitting  Albumin: Negative  Glucose: Negative      The patient was seen and evaluated. There was positive fetal movements. No contractions or leakage of fluid. Signs and symptoms of labor were reviewed.  The S/S of Pre-Eclampsia were reviewed with the patient in detail. She is to report any of these if they occur. She currently denies any of these.    The patient was instructed on fetal kick counts and was given a kick sheet to complete every 8 hours. She was instructed that the baby should move at a minimum of ten times within one hour after a meal. The patient was instructed to lay down on her left side twenty minutes after eating and count movements for up to one hour with a target value of ten movements.  She was instructed to notify the office if she did not make that target after two attempts or if after any attempt there was less than four movements.    The patient reports that the targets have been made Yes.      MAGALY by TVUS: 25 by sono 24    High Risk Dx:  +GBS allergy PCN Resistant clindamycin    PRENATAL LAB RESULTS:   Pre-pregnancy: BMI  Blood Type/Rh: O+  Antibody Screen: neg  Hemoglobin, Hematocrit: 13.5/40.6  Rubella: reactive  T. Pallidum, IgG: neg  Hepatitis B Surface Antigen: neg  TSH: 1.78  HIV: neg  Sickle Cell Screen: n/a  Gonorrhea: NEG  Chlamydia:NEG  Urine culture: NEG    Early  hour Glucose Tolerance Test: N/A    28 wk 1 hr     Glucose Fasting: **  1 hour Glucose Tolerance Test: **  2 hour Glucose Tolerance Test: **  3 hour Glucose Tolerance Test: **    Group B Strep:  positive    Cystic Fibrosis Screen:

## 2025-02-01 ENCOUNTER — HOSPITAL ENCOUNTER (INPATIENT)
Age: 25
LOS: 3 days | Discharge: HOME | End: 2025-02-04
Attending: OBSTETRICS & GYNECOLOGY | Admitting: OBSTETRICS & GYNECOLOGY
Payer: COMMERCIAL

## 2025-02-01 ENCOUNTER — ANESTHESIA (OUTPATIENT)
Dept: OPERATING ROOM | Age: 25
End: 2025-02-01
Payer: COMMERCIAL

## 2025-02-01 ENCOUNTER — ANESTHESIA EVENT (OUTPATIENT)
Dept: OPERATING ROOM | Age: 25
End: 2025-02-01
Payer: COMMERCIAL

## 2025-02-01 PROBLEM — O13.9 GESTATIONAL HYPERTENSION: Status: ACTIVE | Noted: 2025-02-01

## 2025-02-01 PROBLEM — Z3A.39 39 WEEKS GESTATION OF PREGNANCY: Status: ACTIVE | Noted: 2025-02-01

## 2025-02-01 LAB
ABO + RH BLD: NORMAL
ALBUMIN SERPL-MCNC: 4 G/DL (ref 3.5–5.2)
ALBUMIN/GLOB SERPL: 1.3 {RATIO} (ref 1–2.5)
ALP SERPL-CCNC: 220 U/L (ref 35–104)
ALT SERPL-CCNC: 14 U/L (ref 10–35)
AMPHET UR QL SCN: NEGATIVE
ANION GAP SERPL CALCULATED.3IONS-SCNC: 15 MMOL/L (ref 9–16)
ARM BAND NUMBER: NORMAL
AST SERPL-CCNC: 17 U/L (ref 10–35)
BARBITURATES UR QL SCN: NEGATIVE
BASOPHILS # BLD: 0 K/UL (ref 0–0.2)
BASOPHILS NFR BLD: 0 % (ref 0–2)
BENZODIAZ UR QL: NEGATIVE
BILIRUB SERPL-MCNC: 0.2 MG/DL (ref 0–1.2)
BLOOD BANK SAMPLE EXPIRATION: NORMAL
BLOOD GROUP ANTIBODIES SERPL: NEGATIVE
BUN SERPL-MCNC: 7 MG/DL (ref 6–20)
CALCIUM SERPL-MCNC: 9.3 MG/DL (ref 8.6–10.4)
CANNABINOIDS UR QL SCN: NEGATIVE
CHLORIDE SERPL-SCNC: 105 MMOL/L (ref 98–107)
CO2 SERPL-SCNC: 18 MMOL/L (ref 20–31)
COCAINE UR QL SCN: NEGATIVE
CREAT SERPL-MCNC: 0.6 MG/DL (ref 0.6–0.9)
CREAT UR-MCNC: 80.7 MG/DL (ref 28–217)
EOSINOPHIL # BLD: 0.21 K/UL (ref 0–0.4)
EOSINOPHILS RELATIVE PERCENT: 1 % (ref 1–4)
ERYTHROCYTE [DISTWIDTH] IN BLOOD BY AUTOMATED COUNT: 14 % (ref 11.8–14.4)
FENTANYL UR QL: NEGATIVE
GFR, ESTIMATED: >90 ML/MIN/1.73M2
GLUCOSE SERPL-MCNC: 76 MG/DL (ref 74–99)
HCT VFR BLD AUTO: 35.6 % (ref 36.3–47.1)
HGB BLD-MCNC: 11.6 G/DL (ref 11.9–15.1)
IMM GRANULOCYTES # BLD AUTO: 0.86 K/UL (ref 0–0.3)
IMM GRANULOCYTES NFR BLD: 4 %
LYMPHOCYTES NFR BLD: 2.57 K/UL (ref 1–4.8)
LYMPHOCYTES RELATIVE PERCENT: 12 % (ref 24–44)
MCH RBC QN AUTO: 25.1 PG (ref 25.2–33.5)
MCHC RBC AUTO-ENTMCNC: 32.6 G/DL (ref 28.4–34.8)
MCV RBC AUTO: 76.9 FL (ref 82.6–102.9)
METHADONE UR QL: NEGATIVE
MONOCYTES NFR BLD: 0.86 K/UL (ref 0.1–0.8)
MONOCYTES NFR BLD: 4 % (ref 1–7)
MORPHOLOGY: NORMAL
NEUTROPHILS NFR BLD: 79 % (ref 36–66)
NEUTS SEG NFR BLD: 16.9 K/UL (ref 1.8–7.7)
NRBC BLD-RTO: 0 PER 100 WBC
OPIATES UR QL SCN: NEGATIVE
OXYCODONE UR QL SCN: NEGATIVE
PCP UR QL SCN: NEGATIVE
PLATELET # BLD AUTO: 237 K/UL (ref 138–453)
PMV BLD AUTO: 10.6 FL (ref 8.1–13.5)
POTASSIUM SERPL-SCNC: 3.6 MMOL/L (ref 3.7–5.3)
PROT SERPL-MCNC: 7 G/DL (ref 6.6–8.7)
RBC # BLD AUTO: 4.63 M/UL (ref 3.95–5.11)
SODIUM SERPL-SCNC: 138 MMOL/L (ref 136–145)
T PALLIDUM AB SER QL IA: NONREACTIVE
TEST INFORMATION: NORMAL
TOTAL PROTEIN, URINE: 16 MG/DL
URINE TOTAL PROTEIN CREATININE RATIO: 0.2 (ref 0–0.2)
WBC OTHER # BLD: 21.4 K/UL (ref 3.5–11.3)

## 2025-02-01 PROCEDURE — 86850 RBC ANTIBODY SCREEN: CPT

## 2025-02-01 PROCEDURE — 2580000003 HC RX 258

## 2025-02-01 PROCEDURE — 80307 DRUG TEST PRSMV CHEM ANLYZR: CPT

## 2025-02-01 PROCEDURE — 1220000000 HC SEMI PRIVATE OB R&B

## 2025-02-01 PROCEDURE — 6370000000 HC RX 637 (ALT 250 FOR IP)

## 2025-02-01 PROCEDURE — 3700000025 EPIDURAL BLOCK: Performed by: ANESTHESIOLOGY

## 2025-02-01 PROCEDURE — 6360000002 HC RX W HCPCS

## 2025-02-01 PROCEDURE — 6360000002 HC RX W HCPCS: Performed by: ANESTHESIOLOGY

## 2025-02-01 PROCEDURE — 86900 BLOOD TYPING SEROLOGIC ABO: CPT

## 2025-02-01 PROCEDURE — 86901 BLOOD TYPING SEROLOGIC RH(D): CPT

## 2025-02-01 PROCEDURE — 80053 COMPREHEN METABOLIC PANEL: CPT

## 2025-02-01 PROCEDURE — 85025 COMPLETE CBC W/AUTO DIFF WBC: CPT

## 2025-02-01 PROCEDURE — 82570 ASSAY OF URINE CREATININE: CPT

## 2025-02-01 PROCEDURE — 84156 ASSAY OF PROTEIN URINE: CPT

## 2025-02-01 PROCEDURE — 86780 TREPONEMA PALLIDUM: CPT

## 2025-02-01 RX ORDER — SODIUM CHLORIDE 0.9 % (FLUSH) 0.9 %
5-40 SYRINGE (ML) INJECTION PRN
Status: DISCONTINUED | OUTPATIENT
Start: 2025-02-01 | End: 2025-02-02

## 2025-02-01 RX ORDER — DIPHENHYDRAMINE HCL 25 MG
25 TABLET ORAL EVERY 4 HOURS PRN
Status: DISCONTINUED | OUTPATIENT
Start: 2025-02-01 | End: 2025-02-02

## 2025-02-01 RX ORDER — LIDOCAINE HYDROCHLORIDE 10 MG/ML
INJECTION, SOLUTION EPIDURAL; INFILTRATION; INTRACAUDAL; PERINEURAL
Status: DISCONTINUED | OUTPATIENT
Start: 2025-02-01 | End: 2025-02-02 | Stop reason: SDUPTHER

## 2025-02-01 RX ORDER — DIPHENHYDRAMINE HYDROCHLORIDE 50 MG/ML
25 INJECTION INTRAMUSCULAR; INTRAVENOUS EVERY 4 HOURS PRN
Status: DISCONTINUED | OUTPATIENT
Start: 2025-02-01 | End: 2025-02-02

## 2025-02-01 RX ORDER — EPHEDRINE SULFATE/0.9% NACL/PF 25 MG/5 ML
10 SYRINGE (ML) INTRAVENOUS
Status: DISCONTINUED | OUTPATIENT
Start: 2025-02-01 | End: 2025-02-02

## 2025-02-01 RX ORDER — PROCHLORPERAZINE EDISYLATE 5 MG/ML
10 INJECTION INTRAMUSCULAR; INTRAVENOUS ONCE
Status: COMPLETED | OUTPATIENT
Start: 2025-02-01 | End: 2025-02-01

## 2025-02-01 RX ORDER — LIDOCAINE HYDROCHLORIDE AND EPINEPHRINE 15; 5 MG/ML; UG/ML
INJECTION, SOLUTION EPIDURAL
Status: DISCONTINUED | OUTPATIENT
Start: 2025-02-01 | End: 2025-02-02 | Stop reason: SDUPTHER

## 2025-02-01 RX ORDER — NALBUPHINE HYDROCHLORIDE 10 MG/ML
10 INJECTION INTRAMUSCULAR; INTRAVENOUS; SUBCUTANEOUS ONCE
Status: DISCONTINUED | OUTPATIENT
Start: 2025-02-01 | End: 2025-02-01

## 2025-02-01 RX ORDER — EPHEDRINE SULFATE/0.9% NACL/PF 25 MG/5 ML
5 SYRINGE (ML) INTRAVENOUS PRN
Status: DISCONTINUED | OUTPATIENT
Start: 2025-02-02 | End: 2025-02-02

## 2025-02-01 RX ORDER — ROPIVACAINE HYDROCHLORIDE 2 MG/ML
INJECTION, SOLUTION EPIDURAL; INFILTRATION; PERINEURAL
Status: DISCONTINUED | OUTPATIENT
Start: 2025-02-01 | End: 2025-02-02 | Stop reason: SDUPTHER

## 2025-02-01 RX ORDER — SODIUM CHLORIDE 0.9 % (FLUSH) 0.9 %
5-40 SYRINGE (ML) INJECTION EVERY 12 HOURS SCHEDULED
Status: DISCONTINUED | OUTPATIENT
Start: 2025-02-01 | End: 2025-02-02

## 2025-02-01 RX ORDER — NALOXONE HYDROCHLORIDE 0.4 MG/ML
INJECTION, SOLUTION INTRAMUSCULAR; INTRAVENOUS; SUBCUTANEOUS PRN
Status: DISCONTINUED | OUTPATIENT
Start: 2025-02-01 | End: 2025-02-02

## 2025-02-01 RX ORDER — MORPHINE SULFATE 10 MG/ML
8 INJECTION INTRAVENOUS ONCE
Status: COMPLETED | OUTPATIENT
Start: 2025-02-01 | End: 2025-02-01

## 2025-02-01 RX ORDER — ACETAMINOPHEN 500 MG
1000 TABLET ORAL EVERY 6 HOURS PRN
Status: DISCONTINUED | OUTPATIENT
Start: 2025-02-01 | End: 2025-02-02

## 2025-02-01 RX ORDER — ROPIVACAINE HYDROCHLORIDE 2 MG/ML
INJECTION, SOLUTION EPIDURAL; INFILTRATION; PERINEURAL
Status: COMPLETED
Start: 2025-02-01 | End: 2025-02-01

## 2025-02-01 RX ORDER — SODIUM CHLORIDE 9 MG/ML
INJECTION, SOLUTION INTRAVENOUS PRN
Status: DISCONTINUED | OUTPATIENT
Start: 2025-02-01 | End: 2025-02-02

## 2025-02-01 RX ORDER — ONDANSETRON 2 MG/ML
4 INJECTION INTRAMUSCULAR; INTRAVENOUS EVERY 6 HOURS PRN
Status: DISCONTINUED | OUTPATIENT
Start: 2025-02-01 | End: 2025-02-02

## 2025-02-01 RX ORDER — MORPHINE SULFATE 2 MG/ML
2 INJECTION, SOLUTION INTRAMUSCULAR; INTRAVENOUS ONCE
Status: COMPLETED | OUTPATIENT
Start: 2025-02-01 | End: 2025-02-01

## 2025-02-01 RX ORDER — LIDOCAINE HYDROCHLORIDE 10 MG/ML
30 INJECTION, SOLUTION EPIDURAL; INFILTRATION; INTRACAUDAL; PERINEURAL PRN
Status: DISCONTINUED | OUTPATIENT
Start: 2025-02-01 | End: 2025-02-02

## 2025-02-01 RX ADMIN — SODIUM CHLORIDE 1500 MG: 9 INJECTION, SOLUTION INTRAVENOUS at 18:00

## 2025-02-01 RX ADMIN — ACETAMINOPHEN 1000 MG: 500 TABLET ORAL at 22:51

## 2025-02-01 RX ADMIN — MORPHINE SULFATE 8 MG: 10 INJECTION INTRAVENOUS at 19:23

## 2025-02-01 RX ADMIN — LIDOCAINE HYDROCHLORIDE 3 ML: 10 INJECTION, SOLUTION EPIDURAL; INFILTRATION; INTRACAUDAL; PERINEURAL at 22:18

## 2025-02-01 RX ADMIN — LIDOCAINE HYDROCHLORIDE,EPINEPHRINE BITARTRATE 3 ML: 15; .005 INJECTION, SOLUTION EPIDURAL; INFILTRATION; INTRACAUDAL; PERINEURAL at 22:23

## 2025-02-01 RX ADMIN — PROCHLORPERAZINE EDISYLATE 10 MG: 5 INJECTION INTRAMUSCULAR; INTRAVENOUS at 19:23

## 2025-02-01 RX ADMIN — SODIUM CHLORIDE, POTASSIUM CHLORIDE, SODIUM LACTATE AND CALCIUM CHLORIDE: 600; 310; 30; 20 INJECTION, SOLUTION INTRAVENOUS at 23:05

## 2025-02-01 RX ADMIN — SODIUM CHLORIDE, POTASSIUM CHLORIDE, SODIUM LACTATE AND CALCIUM CHLORIDE 1000 ML: 600; 310; 30; 20 INJECTION, SOLUTION INTRAVENOUS at 21:00

## 2025-02-01 RX ADMIN — MORPHINE SULFATE 2 MG: 2 INJECTION, SOLUTION INTRAMUSCULAR; INTRAVENOUS at 19:19

## 2025-02-01 RX ADMIN — Medication 5 ML: at 22:34

## 2025-02-01 RX ADMIN — Medication 10 ML/HR: at 22:37

## 2025-02-01 RX ADMIN — SODIUM CHLORIDE, POTASSIUM CHLORIDE, SODIUM LACTATE AND CALCIUM CHLORIDE 1000 ML: 600; 310; 30; 20 INJECTION, SOLUTION INTRAVENOUS at 22:02

## 2025-02-01 RX ADMIN — ONDANSETRON 4 MG: 2 INJECTION INTRAMUSCULAR; INTRAVENOUS at 22:40

## 2025-02-01 ASSESSMENT — PAIN DESCRIPTION - ORIENTATION
ORIENTATION: MID
ORIENTATION: LOWER

## 2025-02-01 ASSESSMENT — PAIN DESCRIPTION - DESCRIPTORS
DESCRIPTORS: ACHING
DESCRIPTORS: CRAMPING;PRESSURE;TIGHTNESS

## 2025-02-01 ASSESSMENT — PAIN DESCRIPTION - LOCATION
LOCATION: BACK
LOCATION: HEAD
LOCATION: BACK;ABDOMEN

## 2025-02-01 ASSESSMENT — PAIN SCALES - GENERAL
PAINLEVEL_OUTOF10: 8
PAINLEVEL_OUTOF10: 8
PAINLEVEL_OUTOF10: 3

## 2025-02-01 NOTE — H&P
OBSTETRICAL HISTORY AND PHYSICAL  [facility]    Date: 2025       Time: 5:05 PM   Patient Name: Celia Dinero     Patient : 2000  Room/Bed: 0711/0711-01    Admission Date/Time: 2025  4:50 PM      CC: Contractions      HPI: Celia Dinero is a 24 y.o.  at 39w4d who presents with complaint of contractions. She states contractions began around 0230 this AM and were approximately 10-12 minutes apart. Over the last 2 hours she states her contractions are now 1-2 minutes apart and are becoming more difficult to work through.     Patient denies any fever, chills, N/V, headaches, vision changes, chest pain, shortness of breath, RUQ pain, abdominal pain, and increased swelling/tenderness in bilateral lower extremities. Patient denies any vaginal discharge and any urinary complaints. The patient reports fetal movement is present, complains of contractions, denies loss of fluid, denies vaginal bleeding.      DATING:  LMP: Patient's last menstrual period was 03/15/2024.  Estimated Date of Delivery: 25   Based on: LMP c/w early ultrasound at 6 weeks 6 days gestation    PREGNANCY RISK FACTORS:  Patient Active Problem List   Diagnosis    Anxiety    Plaque psoriasis    PTSD (post-traumatic stress disorder)    Chronic major depressive disorder, recurrent episode (HCC)    Attention deficit hyperactivity disorder (ADHD), combined type    Weight loss    Arrington's neuroma of right foot    High risk pregnancy, antepartum    GBS (group B Streptococcus carrier), +RV culture, currently pregnant    39 weeks gestation of pregnancy        Steroids Given In This Pregnancy:  no     REVIEW OF SYSTEMS:  Constitutional: negative fever, chills  HEENT: negative headaches, visual disturbances  Respiratory: negative dyspnea, cough, wheezing  Cardiovascular: negative chest pain, palpitations  Gastrointestinal: abdominal pain 2/2 contractions, RUQ pain, N/V, diarrhea, constipation  Genitourinary:

## 2025-02-01 NOTE — DISCHARGE SUMMARY
gestational hypertension, spinal headache.     Discharge to: Home    Readmission planned: no     Indication for 6 week PP 2 hour GTT?: no     Eligible for 2 week PP virtual visit? no - private patient    Contraception: none    Recommendations on Discharge:     Medications:      Medication List        START taking these medications      acetaminophen 500 MG tablet  Commonly known as: TYLENOL  Take 2 tablets by mouth every 6 hours as needed for Pain     cyclobenzaprine 5 MG tablet  Commonly known as: FLEXERIL  Take 2 tablets by mouth 3 times daily as needed for Muscle spasms     ferrous sulfate 325 (65 Fe) MG tablet  Commonly known as: IRON 325  Take 1 tablet by mouth 2 times daily     ibuprofen 600 MG tablet  Commonly known as: ADVIL;MOTRIN  Take 1 tablet by mouth every 6 hours as needed for Pain     sennosides-docusate sodium 8.6-50 MG tablet  Commonly known as: SENOKOT-S  Take 1 tablet by mouth daily            CONTINUE taking these medications      PNV-DHA 27-0.6-0.4-300 MG Caps  Take 1 tablet by mouth daily               Where to Get Your Medications        These medications were sent to [address] -  [phone] - F [phone]  [address] [place] [id number]      Phone: [phone]   acetaminophen 500 MG tablet  cyclobenzaprine 5 MG tablet  ferrous sulfate 325 (65 Fe) MG tablet  ibuprofen 600 MG tablet  sennosides-docusate sodium 8.6-50 MG tablet           Activity: pelvic rest x 6 weeks, no lifting greater than 15 lbs  Diet: regular diet  Follow up: 3 days for BP check    Condition on discharge: good    Discharge date: 2/4/25    Blaine Hopper MD  Ob/Gyn Resident    Comments:  Home care and follow-up care were reviewed.  Pelvic rest, and birth control were reviewed. Signs and symptoms of mastitis and post partum depression were reviewed. The patient is to notify her physician if any of these occur. The patient was counseled on secondary smoke risks and the

## 2025-02-02 PROCEDURE — 6370000000 HC RX 637 (ALT 250 FOR IP)

## 2025-02-02 PROCEDURE — 2580000003 HC RX 258

## 2025-02-02 PROCEDURE — 6360000002 HC RX W HCPCS

## 2025-02-02 PROCEDURE — 2500000003 HC RX 250 WO HCPCS

## 2025-02-02 PROCEDURE — 7200000001 HC VAGINAL DELIVERY

## 2025-02-02 PROCEDURE — 88307 TISSUE EXAM BY PATHOLOGIST: CPT

## 2025-02-02 PROCEDURE — 1220000000 HC SEMI PRIVATE OB R&B

## 2025-02-02 PROCEDURE — 59400 OBSTETRICAL CARE: CPT | Performed by: OBSTETRICS & GYNECOLOGY

## 2025-02-02 PROCEDURE — 10907ZC DRAINAGE OF AMNIOTIC FLUID, THERAPEUTIC FROM PRODUCTS OF CONCEPTION, VIA NATURAL OR ARTIFICIAL OPENING: ICD-10-PCS | Performed by: OBSTETRICS & GYNECOLOGY

## 2025-02-02 PROCEDURE — 0UQMXZZ REPAIR VULVA, EXTERNAL APPROACH: ICD-10-PCS | Performed by: OBSTETRICS & GYNECOLOGY

## 2025-02-02 RX ORDER — CAFFEINE 200 MG
200 TABLET ORAL ONCE
Status: COMPLETED | OUTPATIENT
Start: 2025-02-02 | End: 2025-02-02

## 2025-02-02 RX ORDER — SODIUM CHLORIDE, SODIUM LACTATE, POTASSIUM CHLORIDE, AND CALCIUM CHLORIDE .6; .31; .03; .02 G/100ML; G/100ML; G/100ML; G/100ML
500 INJECTION, SOLUTION INTRAVENOUS PRN
Status: DISCONTINUED | OUTPATIENT
Start: 2025-02-02 | End: 2025-02-04 | Stop reason: HOSPADM

## 2025-02-02 RX ORDER — METOCLOPRAMIDE HYDROCHLORIDE 5 MG/ML
10 INJECTION INTRAMUSCULAR; INTRAVENOUS ONCE
Status: COMPLETED | OUTPATIENT
Start: 2025-02-02 | End: 2025-02-02

## 2025-02-02 RX ORDER — SIMETHICONE 80 MG
80 TABLET,CHEWABLE ORAL EVERY 6 HOURS PRN
Status: DISCONTINUED | OUTPATIENT
Start: 2025-02-02 | End: 2025-02-04 | Stop reason: HOSPADM

## 2025-02-02 RX ORDER — DIPHENHYDRAMINE HYDROCHLORIDE 50 MG/ML
25 INJECTION INTRAMUSCULAR; INTRAVENOUS EVERY 6 HOURS PRN
Status: DISCONTINUED | OUTPATIENT
Start: 2025-02-02 | End: 2025-02-04 | Stop reason: HOSPADM

## 2025-02-02 RX ORDER — SENNA AND DOCUSATE SODIUM 50; 8.6 MG/1; MG/1
2 TABLET, FILM COATED ORAL NIGHTLY
Status: DISCONTINUED | OUTPATIENT
Start: 2025-02-02 | End: 2025-02-04 | Stop reason: HOSPADM

## 2025-02-02 RX ORDER — SODIUM CHLORIDE 0.9 % (FLUSH) 0.9 %
5-40 SYRINGE (ML) INJECTION EVERY 12 HOURS SCHEDULED
Status: DISCONTINUED | OUTPATIENT
Start: 2025-02-02 | End: 2025-02-04 | Stop reason: HOSPADM

## 2025-02-02 RX ORDER — BISACODYL 10 MG
10 SUPPOSITORY, RECTAL RECTAL DAILY PRN
Status: DISCONTINUED | OUTPATIENT
Start: 2025-02-02 | End: 2025-02-04 | Stop reason: HOSPADM

## 2025-02-02 RX ORDER — ONDANSETRON 4 MG/1
4 TABLET, ORALLY DISINTEGRATING ORAL EVERY 6 HOURS PRN
Status: DISCONTINUED | OUTPATIENT
Start: 2025-02-02 | End: 2025-02-04 | Stop reason: HOSPADM

## 2025-02-02 RX ORDER — LANOLIN
CREAM (ML) TOPICAL PRN
Status: DISCONTINUED | OUTPATIENT
Start: 2025-02-02 | End: 2025-02-04 | Stop reason: HOSPADM

## 2025-02-02 RX ORDER — SODIUM CHLORIDE 0.9 % (FLUSH) 0.9 %
5-40 SYRINGE (ML) INJECTION PRN
Status: DISCONTINUED | OUTPATIENT
Start: 2025-02-02 | End: 2025-02-04 | Stop reason: HOSPADM

## 2025-02-02 RX ORDER — ONDANSETRON 2 MG/ML
4 INJECTION INTRAMUSCULAR; INTRAVENOUS EVERY 6 HOURS PRN
Status: DISCONTINUED | OUTPATIENT
Start: 2025-02-02 | End: 2025-02-04 | Stop reason: HOSPADM

## 2025-02-02 RX ORDER — SODIUM CHLORIDE, SODIUM LACTATE, POTASSIUM CHLORIDE, AND CALCIUM CHLORIDE .6; .31; .03; .02 G/100ML; G/100ML; G/100ML; G/100ML
500 INJECTION, SOLUTION INTRAVENOUS PRN
Status: DISCONTINUED | OUTPATIENT
Start: 2025-02-02 | End: 2025-02-02

## 2025-02-02 RX ORDER — SODIUM CHLORIDE, SODIUM LACTATE, POTASSIUM CHLORIDE, AND CALCIUM CHLORIDE .6; .31; .03; .02 G/100ML; G/100ML; G/100ML; G/100ML
1000 INJECTION, SOLUTION INTRAVENOUS PRN
Status: DISCONTINUED | OUTPATIENT
Start: 2025-02-02 | End: 2025-02-02

## 2025-02-02 RX ORDER — IBUPROFEN 600 MG/1
600 TABLET, FILM COATED ORAL EVERY 6 HOURS PRN
Qty: 30 TABLET | Refills: 1 | Status: SHIPPED | OUTPATIENT
Start: 2025-02-02 | End: 2025-02-04

## 2025-02-02 RX ORDER — SODIUM CHLORIDE 9 MG/ML
INJECTION, SOLUTION INTRAVENOUS PRN
Status: DISCONTINUED | OUTPATIENT
Start: 2025-02-02 | End: 2025-02-04 | Stop reason: HOSPADM

## 2025-02-02 RX ORDER — SENNA AND DOCUSATE SODIUM 50; 8.6 MG/1; MG/1
1 TABLET, FILM COATED ORAL DAILY
Qty: 30 TABLET | Refills: 1 | Status: SHIPPED | OUTPATIENT
Start: 2025-02-02 | End: 2025-02-04

## 2025-02-02 RX ORDER — ACETAMINOPHEN 500 MG
1000 TABLET ORAL EVERY 6 HOURS
Status: DISCONTINUED | OUTPATIENT
Start: 2025-02-02 | End: 2025-02-04 | Stop reason: HOSPADM

## 2025-02-02 RX ORDER — BUTALBITAL, ACETAMINOPHEN AND CAFFEINE 50; 325; 40 MG/1; MG/1; MG/1
1 TABLET ORAL ONCE
Status: DISCONTINUED | OUTPATIENT
Start: 2025-02-02 | End: 2025-02-04 | Stop reason: HOSPADM

## 2025-02-02 RX ORDER — ACETAMINOPHEN 500 MG
1000 TABLET ORAL EVERY 6 HOURS PRN
Qty: 60 TABLET | Refills: 1 | Status: SHIPPED | OUTPATIENT
Start: 2025-02-02 | End: 2025-02-04

## 2025-02-02 RX ORDER — IBUPROFEN 600 MG/1
600 TABLET, FILM COATED ORAL EVERY 6 HOURS
Status: DISCONTINUED | OUTPATIENT
Start: 2025-02-02 | End: 2025-02-04 | Stop reason: HOSPADM

## 2025-02-02 RX ADMIN — IBUPROFEN 600 MG: 600 TABLET, FILM COATED ORAL at 04:59

## 2025-02-02 RX ADMIN — IBUPROFEN 600 MG: 600 TABLET, FILM COATED ORAL at 11:27

## 2025-02-02 RX ADMIN — ACETAMINOPHEN 1000 MG: 500 TABLET, FILM COATED ORAL at 16:14

## 2025-02-02 RX ADMIN — SODIUM CHLORIDE, PRESERVATIVE FREE 10 ML: 5 INJECTION INTRAVENOUS at 10:19

## 2025-02-02 RX ADMIN — SODIUM CHLORIDE 1500 MG: 9 INJECTION, SOLUTION INTRAVENOUS at 03:28

## 2025-02-02 RX ADMIN — ACETAMINOPHEN 1000 MG: 500 TABLET, FILM COATED ORAL at 08:24

## 2025-02-02 RX ADMIN — DIPHENHYDRAMINE HYDROCHLORIDE 25 MG: 50 INJECTION INTRAMUSCULAR; INTRAVENOUS at 10:18

## 2025-02-02 RX ADMIN — METOCLOPRAMIDE HYDROCHLORIDE 10 MG: 5 INJECTION INTRAMUSCULAR; INTRAVENOUS at 10:18

## 2025-02-02 RX ADMIN — Medication 166.7 ML: at 04:15

## 2025-02-02 RX ADMIN — CAFFEINE 200 MG: 200 TABLET ORAL at 16:14

## 2025-02-02 RX ADMIN — CAFFEINE 200 MG: 200 TABLET ORAL at 11:45

## 2025-02-02 ASSESSMENT — PAIN DESCRIPTION - LOCATION
LOCATION: HEAD

## 2025-02-02 ASSESSMENT — PAIN SCALES - GENERAL
PAINLEVEL_OUTOF10: 2
PAINLEVEL_OUTOF10: 4
PAINLEVEL_OUTOF10: 2

## 2025-02-02 ASSESSMENT — PAIN DESCRIPTION - DESCRIPTORS: DESCRIPTORS: POUNDING;PRESSURE

## 2025-02-02 NOTE — ANESTHESIA PROCEDURE NOTES
Epidural Block    Patient location during procedure: OB  Start time: 2/1/2025 10:22 PM  End time: 2/1/2025 10:22 PM  Reason for block: labor epidural  Staffing  Performed: resident/CRNA   Resident/CRNA: Kezia Washington APRN - CRNA  Performed by: Kezia Washington APRN - CRNA  Authorized by: Latonia Alcantar MD    Epidural  Patient position: sitting  Prep: Betadine  Patient monitoring: cardiac monitor, continuous pulse ox and frequent blood pressure checks  Approach: midline  Location: L4-5  Injection technique: CRUZITO air  Guidance: paresthesia technique  Provider prep: mask and sterile gloves  Needle  Needle type: Tuohy   Needle gauge: 17 G  Needle length: 6 in  Needle insertion depth: 7 cm  Catheter type: stylet  Catheter size: 19 G  Catheter at skin depth: 14 cm  Test dose: negativeCatheter Secured: tegaderm and tape  Assessment  Hemodynamics: stable  Attempts: 2  Outcomes: uncomplicated and patient tolerated procedure well  Preanesthetic Checklist  Completed: patient identified, IV checked, site marked, risks and benefits discussed, surgical/procedural consents, equipment checked, pre-op evaluation, timeout performed, anesthesia consent given, oxygen available, monitors applied/VS acknowledged, fire risk safety assessment completed and verbalized and blood product R/B/A discussed and consented

## 2025-02-02 NOTE — PROGRESS NOTES
Duy CRNA at bedside at 2203.  Epidural procedure explained, risks discussed.  Pt verbalizes consent for epidural.   2208 patient positioned for epidural. 2208 Time out completed.   2222 catheter placed. 2223 test dose given, HR 95.  Epidural catheter taped and secured per anesthesia.   2236 to low fowlers with left uterine displacement. 2234 loading dose given. 2237 pump initiated.  Pt tolerated procedure well.

## 2025-02-02 NOTE — L&D DELIVERY NOTE
Bibi Dinero [[id number]]      Labor Events     Labor: No   Steroids: None  Cervical Ripening Date/Time:      Antibiotics Received during Labor: Yes  Rupture Date/Time:  25 00:40:00   Rupture Type: AROM  Fluid Color: Clear, Pink  Fluid Odor: None  Augmentation: AROM  Labor Complications: None       Anesthesia    Method: Epidural       Labor Event Times      Labor onset date/time:        Dilation complete date/time:  25 03:06:00 EST     Start pushing date/time:  2025 03:30:00   Decision date/time (emergent ):            Delivery Details      Delivery Date: 25 Delivery Time: 04:06:50   Delivery Type: Vaginal, Spontaneous               Presentation    Presentation: Vertex  Position: Right  _: Occiput  _: Anterior       Shoulder Dystocia    Shoulder Dystocia Present?: No       Assisted Delivery Details    Forceps Attempted?: No  Vacuum Extractor Attempted?: No                           Cord    Vessels: 3 Vessels  Complications: None  Delayed Cord Clamping?: Yes  Cord Clamped Date/Time: 2025 04:08:00  Cord Blood Disposition: Lab  Gases Sent?: Yes              Placenta    Date/Time: 2025 04:15:00  Removal: Spontaneous  Appearance: Intact  Disposition: Pathology       Lacerations    Episiotomy: None  Perineal Lacerations: None  Other Lacerations: periurethral laceration  Periurethral Laceration: Left Repaired?: Yes   Number of Repair Packets: 1       Vaginal Counts    Initial Count Personnel: ABIGAIL  Initial Count Verified By: ROBER  Intial Sponge Count: Correct Intial Needles Count: Correct Intial Instruments Count: Correct   Final Sponges Count: Correct Final Needles  Count: Correct Final Instruments Count: Correct   Final Count Personnel: ABIGAIL  Final Count Verified By: CAROL  Accurate Final Count?: Yes       Blood Loss  Mother: Celia Dinero #[id number]     Start of Mother's Information      Delivery Blood Loss   Intrapartum & Postpartum: 25 1606 -

## 2025-02-02 NOTE — FLOWSHEET NOTE
Patient admitted to room 741 from L&D via wheelchair.   Oriented to room and surroundings.  Plan of care reviewed.  Verbalized understanding.  Instructed on infant security and safe sleep practices.  Preventing falls education provided .The following handouts given: A New Beginning: Your Guide to Postpartum Care, Rounding, gs Security System,Babies Cry A lot, Safe Sleep, Security and Visitation Guidelines.   Call light placed within reach.

## 2025-02-02 NOTE — PROGRESS NOTES
Labor Progress Note    Celia Dinero is a 24 y.o. female  at 39w5d  The patient was seen and examined. Her pain is well controlled with epidural, however called out reporting increased rectal pressure. She reports fetal movement is present, complains of contractions, complains of loss of fluid, denies vaginal bleeding.       Vital Signs:  Vitals:    25 0131 25 0135 25 0139 25 0140   BP: (!) 115/51  (!) 122/56    Pulse: 87  89    Resp:       Temp:       TempSrc:       SpO2:  96%  97%       FHT: 135, moderate variability, accelerations present, decelerations absent  Contractions: regular, every 2-3 minutes    Chaperone for Intimate Exam: Chaperone was present for entire exam, Chaperone Name: MEIR Clinton  Cervical Exam:   Pitocin: @ 0 mu/min    Membranes: Ruptured clear fluid  Scalp Electrode in place: absent  Intrauterine Pressure Catheter in Place: absent    Interventions: SVE    Assessment/Plan:  Celia Dinero is a 24 y.o. female  at 39w5d admitted for spontaneous labor   - GBS positive, vancomycin for GBS prophylaxis   - VSS, afebrile   - cEFM/TOCO   - SVE:    - Epidural in place   - S/p AROM   - S/p Morphine/Compazine x1   - Attending updated   - Continue to monitor closely    Attending updated and in agreement with plan    Celia Leon DO  Ob/Gyn Resident  2025, 2:09 AM

## 2025-02-02 NOTE — PROGRESS NOTES
Obstetric/Gynecology Resident Interval Note    Resident notified patient's headache returned s/p Benadryl/Reglan and Caffeine. Patient requesting another dose of caffeine at this time. Possible concern for spinal HA. Will attempt another dose at this time. BP normotensive, low suspicion for PreE w/ SF at this time.     Vitals:    02/02/25 0631 02/02/25 0911 02/02/25 1205 02/02/25 1601   BP: (!) 123/58 122/76 124/77 117/74   Pulse: 76 79 66 71   Resp:  18 16 16   Temp:  98 °F (36.7 °C) 98.1 °F (36.7 °C) 98.1 °F (36.7 °C)   TempSrc:  Oral Oral    SpO2:   97%      Will consider anesthesia consultation for spinal GANDHI if needed.     Leeann Sandoval DO  OB/GYN Resident, PGY3  [place], [place]  2/2/2025, 5:10 PM

## 2025-02-02 NOTE — PROGRESS NOTES
Obstetric/Gynecology Resident Interval Note    Resident to bedside as patient reporting increased pressure and FHT with intermittent late/variable deceleration difficult to distinguish.    SVE performed and noted to be complete/100/+1 station. RN going to straight cath patient to empty her bladder and will starting pushing.    Attending updated and en route to hospital.    Celia Leon DO  OB/GYN Resident, PGY2  [place], [place]  2/2/2025, 3:09 AM

## 2025-02-02 NOTE — ANESTHESIA PRE PROCEDURE
(post-traumatic stress disorder) F43.10   • Chronic major depressive disorder, recurrent episode (HCC) F33.9   • Attention deficit hyperactivity disorder (ADHD), combined type F90.2   • Weight loss R63.4   • Arrington's neuroma of right foot G57.61   • High risk pregnancy, antepartum O09.90   • GBS (group B Streptococcus carrier), +RV culture, currently pregnant O99.820   • 39 weeks gestation of pregnancy Z3A.39       Past Medical History:        Diagnosis Date   • ADHD (attention deficit hyperactivity disorder)     Diagnosed as child. Didn’t pursue medication.   • Anxiety 04/11/2022   • Depression 04/11/2022   • Headache    • Psoriasis    • Psoriasis    • Psoriatic arthritis (HCC)        Past Surgical History:        Procedure Laterality Date   • WISDOM TOOTH EXTRACTION         Social History:    Social History     Tobacco Use   • Smoking status: Former     Current packs/day: 0.00     Average packs/day: 0.1 packs/day for 2.4 years (0.2 ttl pk-yrs)     Types: Cigarettes     Start date: 12/1/2019     Quit date: 3/16/2021     Years since quitting: 3.8   • Smokeless tobacco: Never   • Tobacco comments:     I do not smoke or vape at all anymore.   Substance Use Topics   • Alcohol use: Not Currently     Alcohol/week: 2.0 standard drinks of alcohol     Types: 2 Cans of beer per week     Comment: every few weeks                                Counseling given: Not Answered  Tobacco comments: I do not smoke or vape at all anymore.      Vital Signs (Current):   Vitals:    02/01/25 1708 02/01/25 2043   BP: (!) 142/87 115/69   Pulse: 98 (!) 105   Resp: 17 20   Temp: 36.8 °C (98.2 °F) 36.9 °C (98.5 °F)   TempSrc: Oral Oral   SpO2: 98% 99%                                              BP Readings from Last 3 Encounters:   02/01/25 115/69   01/29/25 116/72   01/22/25 112/70       NPO Status:                                                                                 BMI:   Wt Readings from Last 3 Encounters:   01/29/25 80.7 kg  decreased step length/decreased weight-shifting ability

## 2025-02-02 NOTE — PROGRESS NOTES
Labor Progress Note    Celia Dinero is a 24 y.o. female  at 39w5d  The patient was seen and examined. SVE and amniotomy performed at bedside without complication, patient tolerated well.  Her pain is well-controlled with epidural in place. She reports fetal movement is present, complains of contractions, complains of loss of fluid, denies vaginal bleeding.       Vital Signs:  BP (!) 124/52   Pulse 88   Temp 98.5 °F (36.9 °C) (Oral)   Resp 20   LMP 03/15/2024   SpO2 99%      FHT: 140, moderate variability, accelerations present, decelerations absent  Contractions: regular, every 3 minutes    Chaperone for Intimate Exam: Chaperone was present for entire exam, Chaperone Name: MEIR Clinton   Cervical Exam: 4 cm dilated, 90 effaced, -1 station  Pitocin: @ 0 mu/min    Membranes: Ruptured clear fluid  Scalp Electrode in place: absent  Intrauterine Pressure Catheter in Place: absent    Interventions: SVE, amniotomy    Assessment/Plan:  Celia Dinero is a 24 y.o. female  at 39w5d admitted for spontaneous labor   -GBS positive, vancomycin for GBS prophylaxis   -VSS   -Epidural in place   -S/p AROM (clear) # 0040   -Continue expectant        Attending updated and in agreement with plan    Blaine Hopper MD  Ob/Gyn Resident  2025, 12:50 AM

## 2025-02-02 NOTE — FLOWSHEET NOTE
Pt transferred to  via wheelchair with baby in arms and all belongings. Report given to Mary Beth WORLEY. All questions answered at this time.

## 2025-02-02 NOTE — PROGRESS NOTES
Obstetric/Gynecology Resident Interval Note    Resident to room as FHT with periods difficult to trace and distinguish from maternal heart rate. Upon entry to room, patient is seated in rocking chair hunched over breathing through contractions and RN holding monitoring and continuously adjusting.    Anesthesia outside the doorway preparing for epidural placement. Reassuring maternal and fetal status at this time. Continue to monitor closely.    Celia Leon DO  OB/GYN Resident, PGY2  [place], [place]  2/1/2025, 10:05 PM

## 2025-02-02 NOTE — CONSULTS
INPATIENT CONSULT    Maternal /para status:      Current pregnancy:    Gestational age: 39 5/7 weeks     C/section or vaginal delivery:       Birth weight: 3100  6# 13.4oz    Plan for feeding: breast      Breast pump at home: yes      Assessment of breastfeeding:  Mom states some latch difficulty initially, but baby did have a 25 minute feed in recovery.  Mom complaining of possible spinal headache, encouraged to call for assist with next feed to teach side-lying for mom's comfort.         Reviewed:   - Breastfeeding packet  - Expectations for normal  feeding   - Hand expression  - Deep latch/milk transfer  - Cues for feeding (early/late)     Encouraged:   - Frequent skin to skin with mom or dad  - Frequent attempts to feed  - Calling for assistance as needed

## 2025-02-02 NOTE — PROGRESS NOTES
Labor Progress Note    Celia Dinero is a 24 y.o. female  at 39w4d  The patient was seen and examined. Her pain is not well controlled despite morphine/compazine. She reports fetal movement is present, complains of contractions, denies loss of fluid, denies vaginal bleeding. She is requesting SVE.    Vital Signs:  Vitals:    25 1708   BP: (!) 142/87   Pulse: 98   Resp: 17   Temp: 98.2 °F (36.8 °C)   TempSrc: Oral   SpO2: 98%         FHT: Baseline 130, moderate variability, accelerations present, decelerations absent, Category 1 tracing  Contractions: regular, every 2-3 minutes  Cervical Exam: 1 cm dilated, 80 effaced, -1 station  Chaperone for Intimate Exam: Chaperone was present for entire exam, Chaperone Name: Teressa WORLEY  Pitocin: @ 0 mu/min    Membranes: Intact  Scalp Electrode in place: absent  Intrauterine Pressure Catheter in Place: absent    Interventions: SVE    Assessment/Plan:  Celia Dinero is a 24 y.o. female  at 39w4d admitted for IUP   - GBS positive, vancomycin for GBS prophylaxis   - VSS, afebrile    - LR IVF @ 125 mL/hr   - cEFM and TOCO: Cat 1 FHT with regular contractions   - Membranes intact   - SVE 1/80%/-1   - S/p Morphine/compazine x1    - Struggling with pain control, patient wishes to get into shower to help manage pain    - Continue expectant management.     Attending updated and in agreement with plan    Rody Negron DO  Ob/Gyn Resident  [facility]   2025 8:23 PM

## 2025-02-02 NOTE — CARE COORDINATION
ANTEPARTUM NOTE    39 weeks gestation of pregnancy [Z3A.39]    Celia was admitted to L&D on 2/1/2025 for contractions/RR IOL @ 39 4/7    OB GYN Provider: Dr Salazar    Will meet with patient after delivery to verify name/address/phone/insurance and discuss discharge planning.     Anticipate DC home 2 nights after vaginal delivery or 4 nights after C/S delivery as long as hemodynamically stable.

## 2025-02-02 NOTE — PROGRESS NOTES
Labor Progress Note    Celia Dinero is a 24 y.o. female  at 39w4d  Patient called out requesting SVE. The patient was seen and examined. Her pain is not well controlled and she is requesting something for pain. She reports fetal movement is present, complains of contractions, denies loss of fluid, denies vaginal bleeding.       Vital Signs:  Vitals:    25 1708   BP: (!) 142/87   Pulse: 98   Resp: 17   Temp: 98.2 °F (36.8 °C)   TempSrc: Oral   SpO2: 98%         FHT: Baseline 130, moderate variability, accelerations present, decelerations absent, Category 1 tracing  Contractions: regular, every 2-3 minutes  Cervical Exam: 1 cm dilated, 80 effaced, -1 station  Chaperone for Intimate Exam: Chaperone was present for entire exam, Chaperone Name: Teressa WORLEY  Pitocin: @ 0 mu/min    Membranes: Intact  Scalp Electrode in place: absent  Intrauterine Pressure Catheter in Place: absent    Interventions: SVE    Assessment/Plan:  Celia Dinero is a 24 y.o. female  at 39w4d admitted for IUP   - GBS positive, vancomycin for GBS prophylaxis   - VSS, afebrile    - LR IVF @ 125 mL/hr   - cEFM and TOCO: Cat 1 FHT with regular contractions   - Membranes intact   - SVE 1/80%/-1   - Pain control: Morphine/compazine ordered     - Continue expectant management.     Attending updated and in agreement with plan    Rody Negron DO  Ob/Gyn Resident  [facility]   2025 7:10 PM

## 2025-02-02 NOTE — CARE COORDINATION
CASE MANAGEMENT POST-PARTUM TRANSITIONAL CARE PLAN    39 weeks gestation of pregnancy [Z3A.39]    OB Provider: Dr Marie Lujan met w/ Celia at her bedside to discuss DCP. She is S/P  on 2025    Writer verified address,her phone number and emergency contacts are all correct on facesheet.. She states she lives with her mom and dad, but does stay with FOB at times.  She denied barriers with transportation home, to doctor's appointments or with paying for medications upon discharge home.     [facility] insurance correct. Writer notified Celia she has 30 days from date of birth to add  to insurance policy. She verbalized understanding.Baby will go on Dad's insurance primary ([facility] 1996) and hers secondary.      Infant name on BC: Sandra Duncan.   Infant PCP undecided. List given.     DME: no. Has BP cuff at home  HOME CARE: no    Anticipated DC of mother and infant 1-2 days after .     [facility] RISK OF UNPLANNED READMISSION 2.0             6.2 Total Score

## 2025-02-03 ENCOUNTER — ANESTHESIA (OUTPATIENT)
Dept: INPATIENT UNIT | Age: 25
End: 2025-02-03
Payer: COMMERCIAL

## 2025-02-03 ENCOUNTER — HOSPITAL ENCOUNTER (OUTPATIENT)
Dept: INPATIENT UNIT | Age: 25
Discharge: HOME | End: 2025-02-03

## 2025-02-03 ENCOUNTER — ANESTHESIA EVENT (OUTPATIENT)
Dept: INPATIENT UNIT | Age: 25
End: 2025-02-03
Payer: COMMERCIAL

## 2025-02-03 VITALS
WEIGHT: 178 LBS | BODY MASS INDEX: 30.39 KG/M2 | OXYGEN SATURATION: 100 % | HEART RATE: 79 BPM | DIASTOLIC BLOOD PRESSURE: 105 MMHG | RESPIRATION RATE: 15 BRPM | HEIGHT: 64 IN | TEMPERATURE: 97 F | SYSTOLIC BLOOD PRESSURE: 150 MMHG

## 2025-02-03 LAB
HCT VFR BLD AUTO: 30.8 % (ref 36.3–47.1)
HGB BLD-MCNC: 9.9 G/DL (ref 11.9–15.1)

## 2025-02-03 PROCEDURE — 3E0R3GC INTRODUCTION OF OTHER THERAPEUTIC SUBSTANCE INTO SPINAL CANAL, PERCUTANEOUS APPROACH: ICD-10-PCS | Performed by: ANESTHESIOLOGY

## 2025-02-03 PROCEDURE — 85018 HEMOGLOBIN: CPT

## 2025-02-03 PROCEDURE — 7100000001 HC PACU RECOVERY - ADDTL 15 MIN

## 2025-02-03 PROCEDURE — 85014 HEMATOCRIT: CPT

## 2025-02-03 PROCEDURE — 6360000002 HC RX W HCPCS: Performed by: ANESTHESIOLOGY

## 2025-02-03 PROCEDURE — 7100000000 HC PACU RECOVERY - FIRST 15 MIN

## 2025-02-03 PROCEDURE — 62273 INJECT EPIDURAL PATCH: CPT | Performed by: ANESTHESIOLOGY

## 2025-02-03 PROCEDURE — 6370000000 HC RX 637 (ALT 250 FOR IP): Performed by: ANESTHESIOLOGY

## 2025-02-03 PROCEDURE — 6370000000 HC RX 637 (ALT 250 FOR IP)

## 2025-02-03 PROCEDURE — 36415 COLL VENOUS BLD VENIPUNCTURE: CPT

## 2025-02-03 PROCEDURE — 2500000003 HC RX 250 WO HCPCS

## 2025-02-03 PROCEDURE — 1220000000 HC SEMI PRIVATE OB R&B

## 2025-02-03 RX ORDER — FERROUS SULFATE 325(65) MG
325 TABLET ORAL 2 TIMES DAILY
Qty: 60 TABLET | Refills: 1 | Status: SHIPPED | OUTPATIENT
Start: 2025-02-03 | End: 2025-02-04

## 2025-02-03 RX ORDER — LANOLIN ALCOHOL/MO/W.PET/CERES
400 CREAM (GRAM) TOPICAL DAILY
Status: DISCONTINUED | OUTPATIENT
Start: 2025-02-03 | End: 2025-02-04 | Stop reason: HOSPADM

## 2025-02-03 RX ORDER — KETOROLAC TROMETHAMINE 30 MG/ML
30 INJECTION, SOLUTION INTRAMUSCULAR; INTRAVENOUS
Status: COMPLETED | OUTPATIENT
Start: 2025-02-03 | End: 2025-02-03

## 2025-02-03 RX ORDER — CAFFEINE 200 MG
200 TABLET ORAL ONCE
Status: COMPLETED | OUTPATIENT
Start: 2025-02-03 | End: 2025-02-03

## 2025-02-03 RX ORDER — FENTANYL CITRATE 50 UG/ML
25 INJECTION, SOLUTION INTRAMUSCULAR; INTRAVENOUS EVERY 5 MIN PRN
Status: DISCONTINUED | OUTPATIENT
Start: 2025-02-03 | End: 2025-02-06 | Stop reason: HOSPADM

## 2025-02-03 RX ORDER — CAFFEINE 200 MG
200 TABLET ORAL EVERY 4 HOURS PRN
Status: DISCONTINUED | OUTPATIENT
Start: 2025-02-03 | End: 2025-02-04 | Stop reason: HOSPADM

## 2025-02-03 RX ORDER — CYCLOBENZAPRINE HCL 10 MG
10 TABLET ORAL 3 TIMES DAILY PRN
Status: DISCONTINUED | OUTPATIENT
Start: 2025-02-03 | End: 2025-02-04 | Stop reason: HOSPADM

## 2025-02-03 RX ADMIN — CYCLOBENZAPRINE 10 MG: 10 TABLET, FILM COATED ORAL at 18:07

## 2025-02-03 RX ADMIN — IBUPROFEN 600 MG: 600 TABLET, FILM COATED ORAL at 17:32

## 2025-02-03 RX ADMIN — SENNOSIDES AND DOCUSATE SODIUM 2 TABLET: 50; 8.6 TABLET ORAL at 00:31

## 2025-02-03 RX ADMIN — CAFFEINE 200 MG: 200 TABLET ORAL at 14:20

## 2025-02-03 RX ADMIN — ACETAMINOPHEN 1000 MG: 500 TABLET, FILM COATED ORAL at 00:31

## 2025-02-03 RX ADMIN — ACETAMINOPHEN 1000 MG: 500 TABLET, FILM COATED ORAL at 20:20

## 2025-02-03 RX ADMIN — SENNOSIDES AND DOCUSATE SODIUM 2 TABLET: 50; 8.6 TABLET ORAL at 20:20

## 2025-02-03 RX ADMIN — CAFFEINE 200 MG: 200 TABLET ORAL at 01:18

## 2025-02-03 RX ADMIN — ACETAMINOPHEN 1000 MG: 500 TABLET, FILM COATED ORAL at 12:55

## 2025-02-03 RX ADMIN — SODIUM CHLORIDE, PRESERVATIVE FREE 10 ML: 5 INJECTION INTRAVENOUS at 00:32

## 2025-02-03 RX ADMIN — ACETAMINOPHEN 1000 MG: 500 TABLET, FILM COATED ORAL at 07:02

## 2025-02-03 RX ADMIN — SODIUM CHLORIDE, PRESERVATIVE FREE 10 ML: 5 INJECTION INTRAVENOUS at 20:20

## 2025-02-03 RX ADMIN — Medication 400 MG: at 04:59

## 2025-02-03 RX ADMIN — FENTANYL CITRATE 25 MCG: 50 INJECTION, SOLUTION INTRAMUSCULAR; INTRAVENOUS at 10:53

## 2025-02-03 RX ADMIN — KETOROLAC TROMETHAMINE 30 MG: 30 INJECTION, SOLUTION INTRAMUSCULAR; INTRAVENOUS at 11:02

## 2025-02-03 ASSESSMENT — PAIN SCALES - GENERAL
PAINLEVEL_OUTOF10: 4
PAINLEVEL_OUTOF10: 9
PAINLEVEL_OUTOF10: 2
PAINLEVEL_OUTOF10: 2
PAINLEVEL_OUTOF10: 4
PAINLEVEL_OUTOF10: 4
PAINLEVEL_OUTOF10: 10

## 2025-02-03 ASSESSMENT — PAIN DESCRIPTION - DESCRIPTORS
DESCRIPTORS: ACHING;DULL
DESCRIPTORS: ACHING;DISCOMFORT
DESCRIPTORS: SHARP;SHOOTING
DESCRIPTORS: ACHING;DISCOMFORT;STABBING

## 2025-02-03 ASSESSMENT — PAIN DESCRIPTION - LOCATION
LOCATION: ABDOMEN;HEAD
LOCATION: HEAD
LOCATION: HEAD;NECK
LOCATION: NECK;HEAD;SHOULDER
LOCATION: SHOULDER;HEAD
LOCATION: NECK

## 2025-02-03 ASSESSMENT — PAIN - FUNCTIONAL ASSESSMENT
PAIN_FUNCTIONAL_ASSESSMENT: ACTIVITIES ARE NOT PREVENTED
PAIN_FUNCTIONAL_ASSESSMENT: NONE - DENIES PAIN

## 2025-02-03 ASSESSMENT — PAIN DESCRIPTION - PAIN TYPE: TYPE: ACUTE PAIN

## 2025-02-03 ASSESSMENT — PAIN DESCRIPTION - ORIENTATION
ORIENTATION: MID
ORIENTATION: LOWER

## 2025-02-03 NOTE — CONSULTS
Called to bedside to evaluate patient regarding post epidural headache. Patient states continuing headache initially improving with caffeine and tylenol but no longer responding. Patient in darkened room unable to sit up past 10 degrees without pain at base of skull and between shoulder blades. Headache improves with laying flat. Discussed risks and benefits of epidural blood patch vs conservative treatment. Patient amenable to proceed with epidural blood patch. Dr. Alcantar notified.

## 2025-02-03 NOTE — FLOWSHEET NOTE
Initiation of Electric Breast Pumping     Pumping Initiated at bedside. Pt has different flange size inserts of 16. Pt having tingling, sharp, stabbing pain from back of neck into shoulder blades. Fearful she may not be able to put to breast and hold . Pumping initiated.    Initiated due to    []   Baby in NICU   []   Plans exclusive pumping   []   Infant weight loss(supplement)   []   Baby not latching well    Flange Size    Right:   Left:     []   24    []   24     []   27    []   27     []   30    []   30     []   36    []   36  Instructions   []   Verbal instructions on how to setup pump and how to use initiation phase   []   Written sheet\" How to keep your breast pump kit clean\"   []   Expectation sheet for Breastfeeding mothers with pumping log   []   Frequency of pumping   []   Collection,labeling and storage of colostrum and milk    Supplies Provided   [x]   Pump initiation kit   [x]   Cleaning supplies (basin and soap)   []   Additional flange size   [x]   Oral syringes/snappies   []   Patient labels       -

## 2025-02-03 NOTE — LACTATION NOTE
Mom reports that her baby has been doing fairly well at breast, noting that with practice she was able to latch the baby on in side lying position. Reviewed feeding patterns. Encouraged her to call out for assistance as needed.

## 2025-02-03 NOTE — CONSULTS
Pt has postural headache and severe neck stiffness sine delivery 2 days ago. Transient improvement with caffeine  Likely PDPH although there is no documented dural puncture.       Reviewed all available relevant information, laboratory results and diagnostic tests.  Discussed risks , benefits and alternatives .    Pt / family given opportunity to ask questions.    All questions answered.      Agrees to blood patch

## 2025-02-03 NOTE — PROGRESS NOTES
POST PARTUM DAY # 1    Celia Dinero is a 24 y.o. female  This patient was seen & examined today.  on 25    Her pregnancy was complicated by:   Patient Active Problem List   Diagnosis    Anxiety    Plaque psoriasis    PTSD (post-traumatic stress disorder)    Chronic major depressive disorder, recurrent episode (HCC)    Attention deficit hyperactivity disorder (ADHD), combined type    Weight loss    Arrington's neuroma of right foot    High risk pregnancy, antepartum    GBS (group B Streptococcus carrier), +RV culture, currently pregnant    39 weeks gestation of pregnancy    gHTN (G2)     25 F Apg 8/9 Wt 6#13    Normal labor and delivery       Today she is doing well without any chief complaint. Her lochia is light. She denies chest pain, shortness of breath, headache, lightheadedness and blurred vision. She is breast feeding and she denies any breast tenderness. She is ambulating well. Her voiding pattern is normal. I reviewed signs and symptoms of post partum depression with the patient, she currently denies any of these symptoms. She is tolerating solids.     Vital Signs:  Vitals:    25 0631 25 0911 25 1205 25 1601   BP: (!) 123/58 122/76 124/77 117/74   Pulse: 76 79 66 71   Resp:  18 16 16   Temp:  98 °F (36.7 °C) 98.1 °F (36.7 °C) 98.1 °F (36.7 °C)   TempSrc:  Oral Oral Oral   SpO2:   97%        Physical Exam:  General:  no apparent distress, alert and cooperative  Neurologic:  alert, oriented, normal speech, no focal findings or movement disorder noted  Lungs:  No increased work of breathing, good air exchange, clear to auscultation bilaterally, no crackles or wheezing  Heart:  Normal apical impulse, regular rate and rhythm, normal S1 and S2, no S3 or S4, and no murmur noted    Abdomen: abdomen soft, non-distended, non-tender  Fundus: non-tender, firm, below umbilicus  Extremities:  no calf tenderness, non edematous    Lab:  Lab Results   Component Value Date    HGB 11.6 (L)

## 2025-02-03 NOTE — FLOWSHEET NOTE
Received into room 741 per cart from PACU. Pt ambulated from cart to bed per pt request- tolerated well. Family at bedside.

## 2025-02-03 NOTE — FLOWSHEET NOTE
Pt c/o neck/head/shoulder pain gradually returning. Pt states \"aches\" when just laying in bed but if makes any movement, pt states has tingling, sharp, stabbing pain from back of neck in between shoulder blades. Dr Oracio bennett served and to evaluate after sign out.

## 2025-02-03 NOTE — FLOWSHEET NOTE
Sitting up in bed. C/o headache in back of head and neck. Rates pain a 3. Requesting a caffeine pill. Anesthesia notified.

## 2025-02-03 NOTE — ANESTHESIA PROCEDURE NOTES
Epidural Blood Patch    Patient location during procedure: pre-op  Start time: 2/3/2025 10:32 AM  End time: 2/3/2025 10:58 AM  Staffing  Performed: anesthesiologist   Anesthesiologist: Latonia Alcantar MD  Performed by: Latonia Alcantar MD  Authorized by: Latonia Alcantar MD    Epidural  Patient position: sitting  Prep: ChloraPrep  Patient monitoring: cardiac monitor and continuous pulse ox  Approach: midline  Injection technique: CRUZITO saline  Provider prep: mask  Needle  Needle type: Tuohy   Needle gauge: 22 G  Needle length: 3.5 in  Assessment  Attempts: 2  Additional Notes    Has  mild lumbar scoliosis .    Preanesthetic Checklist  Completed: patient identified, IV checked, site marked, risks and benefits discussed, surgical/procedural consents, equipment checked, pre-op evaluation, timeout performed, anesthesia consent given, oxygen available, monitors applied/VS acknowledged, fire risk safety assessment completed and verbalized and blood product R/B/A discussed and consented

## 2025-02-04 VITALS
DIASTOLIC BLOOD PRESSURE: 74 MMHG | TEMPERATURE: 98.2 F | HEART RATE: 77 BPM | RESPIRATION RATE: 16 BRPM | SYSTOLIC BLOOD PRESSURE: 122 MMHG | OXYGEN SATURATION: 99 %

## 2025-02-04 PROCEDURE — 6370000000 HC RX 637 (ALT 250 FOR IP)

## 2025-02-04 PROCEDURE — 99024 POSTOP FOLLOW-UP VISIT: CPT | Performed by: OBSTETRICS & GYNECOLOGY

## 2025-02-04 RX ORDER — IBUPROFEN 600 MG/1
600 TABLET, FILM COATED ORAL EVERY 6 HOURS PRN
Qty: 30 TABLET | Refills: 1 | Status: SHIPPED | OUTPATIENT
Start: 2025-02-04

## 2025-02-04 RX ORDER — SENNA AND DOCUSATE SODIUM 50; 8.6 MG/1; MG/1
1 TABLET, FILM COATED ORAL DAILY
Qty: 30 TABLET | Refills: 1 | Status: SHIPPED | OUTPATIENT
Start: 2025-02-04

## 2025-02-04 RX ORDER — FERROUS SULFATE 325(65) MG
325 TABLET ORAL 2 TIMES DAILY
Qty: 60 TABLET | Refills: 1 | Status: SHIPPED | OUTPATIENT
Start: 2025-02-04

## 2025-02-04 RX ORDER — CYCLOBENZAPRINE HCL 5 MG
10 TABLET ORAL 3 TIMES DAILY PRN
Qty: 10 TABLET | Refills: 0 | Status: SHIPPED | OUTPATIENT
Start: 2025-02-04 | End: 2025-02-14

## 2025-02-04 RX ORDER — ACETAMINOPHEN 500 MG
1000 TABLET ORAL EVERY 6 HOURS PRN
Qty: 60 TABLET | Refills: 1 | Status: SHIPPED | OUTPATIENT
Start: 2025-02-04

## 2025-02-04 RX ADMIN — ACETAMINOPHEN 1000 MG: 500 TABLET, FILM COATED ORAL at 04:56

## 2025-02-04 RX ADMIN — Medication 400 MG: at 09:11

## 2025-02-04 RX ADMIN — CYCLOBENZAPRINE 10 MG: 10 TABLET, FILM COATED ORAL at 09:11

## 2025-02-04 RX ADMIN — IBUPROFEN 600 MG: 600 TABLET, FILM COATED ORAL at 06:50

## 2025-02-04 RX ADMIN — IBUPROFEN 600 MG: 600 TABLET, FILM COATED ORAL at 00:06

## 2025-02-04 RX ADMIN — CYCLOBENZAPRINE 10 MG: 10 TABLET, FILM COATED ORAL at 02:04

## 2025-02-04 ASSESSMENT — PAIN SCALES - GENERAL
PAINLEVEL_OUTOF10: 3
PAINLEVEL_OUTOF10: 2

## 2025-02-04 ASSESSMENT — PAIN - FUNCTIONAL ASSESSMENT
PAIN_FUNCTIONAL_ASSESSMENT: ACTIVITIES ARE NOT PREVENTED

## 2025-02-04 ASSESSMENT — PAIN DESCRIPTION - DESCRIPTORS
DESCRIPTORS: DULL;ACHING
DESCRIPTORS: ACHING;DULL

## 2025-02-04 ASSESSMENT — PAIN DESCRIPTION - ORIENTATION
ORIENTATION: MID
ORIENTATION: MID
ORIENTATION: MID;LOWER
ORIENTATION: MID

## 2025-02-04 ASSESSMENT — PAIN DESCRIPTION - LOCATION
LOCATION: HEAD;SHOULDER
LOCATION: HEAD;NECK
LOCATION: HEAD;NECK
LOCATION: HEAD;SHOULDER

## 2025-02-04 NOTE — LACTATION NOTE
Reviewed discharge information, pt states baby has been nursing well every 2-3 hours, however latch is \"pinchy\" at times. Discussed importance of deep, comfortable latch and positioning tips to achieve. Pt states she has a pump at home if needed, reviewed Spectra settings.

## 2025-02-04 NOTE — PROGRESS NOTES
Obstetric/Gynecology Resident Interval Note    Notified by RN that patient scored 15 on her EPDS. She states that she is still shell shock from the delivery. She is coping it well. She denies s/sx of anxiety or depression. Denies SI/HI. States that she has good social support at home. Writer counseled on PPD. Patient voiced understating. Will continue to monitor.    Vitals:    02/03/25 1600 02/03/25 2020 02/04/25 0010 02/04/25 0453   BP: 124/75 123/70 125/80 113/64   Pulse: 73 76 85 60   Resp: 18 16 18 16   Temp: 97.5 °F (36.4 °C) 97.9 °F (36.6 °C) 97.7 °F (36.5 °C) 97.7 °F (36.5 °C)   TempSrc: Oral Oral Oral Oral   SpO2: 98% 99% 99% 97%         Luci Narayanan DO  OB/GYN Resident, PGY 1  [place], [place]  2/4/2025, 6:35 AM

## 2025-02-04 NOTE — PROGRESS NOTES
Obstetrical Rounds:    POD/PPD #: 2  Hospital Day:3   Procedure: normal spontaneous vaginal delivery    Date: 2025  Time: 9:57 AM        Patient Name: Celia Dinero  Patient : 2000  Room/Bed: 0741/0741-01  Admission Date/Time: 2025  4:50 PM  MRN #: [id number]  [facility] #: [id number]        Attending Physician Statement  I have discussed the care of Celia Dinero, including pertinent history and exam findings,  with the resident. I have reviewed their note in the electronic medical record. I have seen and examined the patient and the key elements of all parts of the encounter have been performed/reviewed by me .  I agree with the assessment, plan and orders as documented by the resident.   Pt seen & examined. Pt without c/c. Pt denies S/Sx depression. Pt feeling less anxious. Pt declined any anxiety meds. Pt feels as if she is coping well. Pt denies any suicidal ideations. Plan discharge today with close follow up 1 week in office  Vitals:    25 0830   BP: 122/74   Pulse: 77   Resp: 16   Temp: 98.2 °F (36.8 °C)   SpO2: 99%       Admission on 2025   Component Date Value Ref Range Status    Blood Bank Sample Expiration 2025,2359   Final    Arm Band Number 2025 [id number]   Final    ABO/Rh 2025 O POSITIVE   Final    Antibody Screen 2025 NEGATIVE   Final    T. pallidum, IgG 2025 NONREACTIVE  NONREACTIVE Final    Comment:       T. pallidum antibodies are not detected.  There is no serological evidence of infection with T. pallidum (early primary syphilis   cannot be excluded).  Retest in 2-4 weeks if syphilis is clinically suspect.            WBC 2025 21.4 (H)  3.5 - 11.3 k/uL Final    RBC 2025 4.63  3.95 - 5.11 m/uL Final    Hemoglobin 2025 11.6 (L)  11.9 - 15.1 g/dL Final    Hematocrit 2025 35.6 (L)  36.3 - 47.1 % Final    MCV 2025 76.9 (L)  82.6 - 102.9 fL Final    MCH 2025 25.1 (L)  25.2 - 33.5 pg Final    MCHC

## 2025-02-04 NOTE — PROGRESS NOTES
CLINICAL PHARMACY NOTE: MEDS TO BEDS    Total # of Prescriptions Filled: 5   The following medications were delivered to the patient:  Senexon-s  Ibuprofen  Ferosul  Acetaminophen  cyclobenzaprine    Additional Documentation: Jami koenig

## 2025-02-04 NOTE — LACTATION NOTE
Called in to assist with latch, refined position in cross cradle hold, bringing baby closer to mom, and taught breast support. Pt knows to correct if shallow. Baby nursing comfortably with audible swallows. Reviewed other positioning tips and encouraged to reach out to lactation with any questions or concerns.

## 2025-02-04 NOTE — PLAN OF CARE
Problem: Pain  Goal: Verbalizes/displays adequate comfort level or baseline comfort level  2025 1035 by Malorie Hills RN  Outcome: HH/HSPC Resolved Met  2025 0636 by Freddy Carranza RN  Outcome: Progressing     Problem: Infection - Adult  Goal: Absence of infection at discharge  2025 1035 by Malorie Hills RN  Outcome: HH/HSPC Resolved Met  2025 0636 by Freddy Carranza RN  Outcome: Progressing  Goal: Absence of infection during hospitalization  2025 1035 by Malorie Hills RN  Outcome: HH/HSPC Resolved Met  2025 0636 by Freddy Carranza RN  Outcome: Progressing  Goal: Absence of fever/infection during anticipated neutropenic period  2025 1035 by Malorie Hills RN  Outcome: HH/HSPC Resolved Met  2025 0636 by Freddy Carranza RN  Outcome: Progressing     Problem: Postpartum  Goal: Experiences normal postpartum course  2025 1035 by Malorie Hills RN  Outcome: HH/HSPC Resolved Met  2025 0636 by Freddy Carranza RN  Outcome: Progressing  Goal: Appropriate maternal -  bonding  2025 1035 by Malorie Hills RN  Outcome: HH/HSPC Resolved Met  2025 0636 by Freddy Carranza RN  Outcome: Progressing  Goal: Incisions, wounds, or drain sites healing without S/S of infection  2025 1035 by Malorie Hills RN  Outcome: HH/HSPC Resolved Met  2025 0636 by Freddy Carranza RN  Outcome: Progressing     Problem: Safety - Adult  Goal: Free from fall injury  2025 1035 by Malorie Hills RN  Outcome: HH/HSPC Resolved Met  2025 0636 by Freddy Carranza RN  Outcome: Progressing     Problem: Discharge Planning  Goal: Discharge to home or other facility with appropriate resources  2025 1035 by Malorie Hills RN  Outcome: HH/HSPC Resolved Met  2025 0636 by Freddy Carranza, RN  Outcome: Progressing

## 2025-02-04 NOTE — PROGRESS NOTES
POST PARTUM DAY # 2    Celia Dinero is a 24 y.o. female  This patient was seen & examined today.  on 25    Her pregnancy was complicated by:   Patient Active Problem List   Diagnosis    Anxiety    Plaque psoriasis    PTSD (post-traumatic stress disorder)    Chronic major depressive disorder, recurrent episode (HCC)    Attention deficit hyperactivity disorder (ADHD), combined type    Weight loss    Arrington's neuroma of right foot    High risk pregnancy, antepartum    GBS (group B Streptococcus carrier), +RV culture, currently pregnant    39 weeks gestation of pregnancy    gHTN (G2)     25 F Apg 8/9 Wt 6#13    Normal labor and delivery       Today she is doing well without any chief complaint. Her lochia is light. She denies chest pain, shortness of breath, headache, lightheadedness and blurred vision. She is breast feeding and she denies any breast tenderness. She is ambulating well. Her voiding pattern is normal. I reviewed signs and symptoms of post partum depression with the patient, she currently denies any of these symptoms. She is tolerating solids.     Vital Signs:  Vitals:    25 1211 25 1600 25 2020 25 0010   BP: 136/88 124/75 123/70 125/80   Pulse: 67 73 76 85   Resp: 16 18 16 18   Temp: 97.7 °F (36.5 °C) 97.5 °F (36.4 °C) 97.9 °F (36.6 °C) 97.7 °F (36.5 °C)   TempSrc: Oral Oral Oral Oral   SpO2: 99% 98% 99% 99%         Physical Exam:  General:  no apparent distress, alert and cooperative  Neurologic:  alert, oriented, normal speech, no focal findings or movement disorder noted  Lungs:  No increased work of breathing, good air exchange, clear to auscultation bilaterally, no crackles or wheezing  Heart:  Normal apical impulse, regular rate and rhythm, normal S1 and S2, no S3 or S4, and no murmur noted    Abdomen: abdomen soft, non-distended, non-tender  Fundus: non-tender, firm, below umbilicus  Extremities:  no calf tenderness, non edematous    Lab:  Lab Results

## 2025-02-04 NOTE — CARE COORDINATION
Discharge Report    [facility]  Clinical Case Management Department  Written by: LETY CHATTERJEE RN    Patient Name: Celia Dinero  Attending Provider: Liz Holly*  Admit Date: 2025  4:50 PM  MRN: [id number]  Account: [id number]                     : 2000  Discharge Date: 25      Disposition: home    LETY CHATTERJEE RN

## 2025-02-04 NOTE — PLAN OF CARE
Problem: Pain  Goal: Verbalizes/displays adequate comfort level or baseline comfort level  Outcome: Progressing     Problem: Infection - Adult  Goal: Absence of infection at discharge  Outcome: Progressing  Goal: Absence of infection during hospitalization  Outcome: Progressing  Goal: Absence of fever/infection during anticipated neutropenic period  Outcome: Progressing     Problem: Postpartum  Goal: Experiences normal postpartum course  Description:  Postpartum OB-Pregnancy care plan goal which identifies if the mother is experiencing a normal postpartum course  Outcome: Progressing  Goal: Appropriate maternal -  bonding  Description:  Postpartum OB-Pregnancy care plan goal which identifies if the mother and  are bonding appropriately  Outcome: Progressing  Goal: Incisions, wounds, or drain sites healing without S/S of infection  Outcome: Progressing     Problem: Safety - Adult  Goal: Free from fall injury  Outcome: Progressing     Problem: Discharge Planning  Goal: Discharge to home or other facility with appropriate resources  Outcome: Progressing

## 2025-02-04 NOTE — FLOWSHEET NOTE
Discharge instructions given with understanding voiced.EVELY[facility] your life: Post-Birth Warning Signs handout reviewed and given to mother. Understanding verbalized.

## 2025-02-05 ENCOUNTER — HOSPITAL ENCOUNTER (EMERGENCY)
Age: 25
Discharge: HOME | End: 2025-02-05
Attending: EMERGENCY MEDICINE
Payer: COMMERCIAL

## 2025-02-05 ENCOUNTER — TELEPHONE (OUTPATIENT)
Dept: OBGYN CLINIC | Age: 25
End: 2025-02-05

## 2025-02-05 ENCOUNTER — TELEPHONE (OUTPATIENT)
Dept: FAMILY MEDICINE CLINIC | Age: 25
End: 2025-02-05

## 2025-02-05 VITALS
SYSTOLIC BLOOD PRESSURE: 114 MMHG | OXYGEN SATURATION: 100 % | HEART RATE: 84 BPM | TEMPERATURE: 98.1 F | DIASTOLIC BLOOD PRESSURE: 78 MMHG | RESPIRATION RATE: 17 BRPM

## 2025-02-05 DIAGNOSIS — R51.9 ACUTE NONINTRACTABLE HEADACHE, UNSPECIFIED HEADACHE TYPE: Primary | ICD-10-CM

## 2025-02-05 DIAGNOSIS — R51.9 NONINTRACTABLE HEADACHE, UNSPECIFIED CHRONICITY PATTERN, UNSPECIFIED HEADACHE TYPE: Primary | ICD-10-CM

## 2025-02-05 LAB
ALBUMIN SERPL-MCNC: 3.6 G/DL (ref 3.5–5.2)
ALBUMIN/GLOB SERPL: 1.1 {RATIO} (ref 1–2.5)
ALP SERPL-CCNC: 172 U/L (ref 35–104)
ALT SERPL-CCNC: 28 U/L (ref 10–35)
ANION GAP SERPL CALCULATED.3IONS-SCNC: 15 MMOL/L (ref 9–16)
AST SERPL-CCNC: 31 U/L (ref 10–35)
BILIRUB SERPL-MCNC: 0.3 MG/DL (ref 0–1.2)
BUN SERPL-MCNC: 9 MG/DL (ref 6–20)
CALCIUM SERPL-MCNC: 8.8 MG/DL (ref 8.6–10.4)
CHLORIDE SERPL-SCNC: 108 MMOL/L (ref 98–107)
CO2 SERPL-SCNC: 18 MMOL/L (ref 20–31)
CREAT SERPL-MCNC: 0.7 MG/DL (ref 0.6–0.9)
ERYTHROCYTE [DISTWIDTH] IN BLOOD BY AUTOMATED COUNT: 14.6 % (ref 11.8–14.4)
GFR, ESTIMATED: >90 ML/MIN/1.73M2
GLUCOSE SERPL-MCNC: 81 MG/DL (ref 74–99)
HCT VFR BLD AUTO: 36.5 % (ref 36.3–47.1)
HGB BLD-MCNC: 11.4 G/DL (ref 11.9–15.1)
INR PPP: 1
MCH RBC QN AUTO: 24.6 PG (ref 25.2–33.5)
MCHC RBC AUTO-ENTMCNC: 31.2 G/DL (ref 28.4–34.8)
MCV RBC AUTO: 78.7 FL (ref 82.6–102.9)
NRBC BLD-RTO: 0 PER 100 WBC
PLATELET # BLD AUTO: 304 K/UL (ref 138–453)
PMV BLD AUTO: 10.7 FL (ref 8.1–13.5)
POTASSIUM SERPL-SCNC: 3.9 MMOL/L (ref 3.7–5.3)
PROT SERPL-MCNC: 6.8 G/DL (ref 6.6–8.7)
PROTHROMBIN TIME: 12.7 SEC (ref 11.7–14.9)
RBC # BLD AUTO: 4.64 M/UL (ref 3.95–5.11)
SODIUM SERPL-SCNC: 141 MMOL/L (ref 136–145)
SURGICAL PATHOLOGY REPORT: NORMAL
WBC OTHER # BLD: 13.6 K/UL (ref 3.5–11.3)

## 2025-02-05 PROCEDURE — 6370000000 HC RX 637 (ALT 250 FOR IP): Performed by: EMERGENCY MEDICINE

## 2025-02-05 PROCEDURE — 85027 COMPLETE CBC AUTOMATED: CPT

## 2025-02-05 PROCEDURE — 99284 EMERGENCY DEPT VISIT MOD MDM: CPT | Performed by: EMERGENCY MEDICINE

## 2025-02-05 PROCEDURE — 2580000003 HC RX 258: Performed by: EMERGENCY MEDICINE

## 2025-02-05 PROCEDURE — 6370000000 HC RX 637 (ALT 250 FOR IP)

## 2025-02-05 PROCEDURE — 96360 HYDRATION IV INFUSION INIT: CPT | Performed by: EMERGENCY MEDICINE

## 2025-02-05 PROCEDURE — 80053 COMPREHEN METABOLIC PANEL: CPT

## 2025-02-05 PROCEDURE — 93005 ELECTROCARDIOGRAM TRACING: CPT | Performed by: EMERGENCY MEDICINE

## 2025-02-05 PROCEDURE — 85610 PROTHROMBIN TIME: CPT

## 2025-02-05 RX ORDER — CAFFEINE 200 MG
200 TABLET ORAL ONCE
Status: COMPLETED | OUTPATIENT
Start: 2025-02-05 | End: 2025-02-05

## 2025-02-05 RX ORDER — CYCLOBENZAPRINE HCL 10 MG
10 TABLET ORAL ONCE
Status: COMPLETED | OUTPATIENT
Start: 2025-02-05 | End: 2025-02-05

## 2025-02-05 RX ORDER — OXYCODONE HYDROCHLORIDE 5 MG/1
5 TABLET ORAL EVERY 6 HOURS PRN
Qty: 16 TABLET | Refills: 0 | Status: SHIPPED | OUTPATIENT
Start: 2025-02-05 | End: 2025-02-08

## 2025-02-05 RX ORDER — DIPHENHYDRAMINE HCL 25 MG
25 TABLET ORAL ONCE
Status: COMPLETED | OUTPATIENT
Start: 2025-02-05 | End: 2025-02-05

## 2025-02-05 RX ORDER — BUTALBITAL, ACETAMINOPHEN AND CAFFEINE 50; 325; 40 MG/1; MG/1; MG/1
1 TABLET ORAL ONCE
Status: COMPLETED | OUTPATIENT
Start: 2025-02-05 | End: 2025-02-05

## 2025-02-05 RX ORDER — 0.9 % SODIUM CHLORIDE 0.9 %
1000 INTRAVENOUS SOLUTION INTRAVENOUS ONCE
Status: COMPLETED | OUTPATIENT
Start: 2025-02-05 | End: 2025-02-05

## 2025-02-05 RX ORDER — CAFFEINE 200 MG
200 TABLET ORAL EVERY 4 HOURS PRN
Qty: 28 TABLET | Refills: 0 | Status: SHIPPED | OUTPATIENT
Start: 2025-02-05 | End: 2025-02-12

## 2025-02-05 RX ORDER — LANOLIN ALCOHOL/MO/W.PET/CERES
400 CREAM (GRAM) TOPICAL DAILY
Status: DISCONTINUED | OUTPATIENT
Start: 2025-02-05 | End: 2025-02-05 | Stop reason: HOSPADM

## 2025-02-05 RX ORDER — METOCLOPRAMIDE 10 MG/1
10 TABLET ORAL ONCE
Status: COMPLETED | OUTPATIENT
Start: 2025-02-05 | End: 2025-02-05

## 2025-02-05 RX ORDER — OXYCODONE HYDROCHLORIDE 5 MG/1
10 TABLET ORAL ONCE
Status: COMPLETED | OUTPATIENT
Start: 2025-02-05 | End: 2025-02-05

## 2025-02-05 RX ORDER — BUTALBITAL, ACETAMINOPHEN AND CAFFEINE 50; 325; 40 MG/1; MG/1; MG/1
1 TABLET ORAL EVERY 4 HOURS PRN
Qty: 20 TABLET | Refills: 0 | Status: SHIPPED | OUTPATIENT
Start: 2025-02-05

## 2025-02-05 RX ADMIN — BUTALBITAL, ACETAMINOPHEN, AND CAFFEINE 1 TABLET: 325; 50; 40 TABLET ORAL at 14:43

## 2025-02-05 RX ADMIN — OXYCODONE 10 MG: 5 TABLET ORAL at 13:40

## 2025-02-05 RX ADMIN — SODIUM CHLORIDE 1000 ML: 9 INJECTION, SOLUTION INTRAVENOUS at 11:46

## 2025-02-05 RX ADMIN — MAGNESIUM OXIDE TAB 400 MG (241.3 MG ELEMENTAL MG) 400 MG: 400 (241.3 MG) TAB at 14:43

## 2025-02-05 RX ADMIN — DIPHENHYDRAMINE HCL 25 MG: 25 TABLET ORAL at 12:21

## 2025-02-05 RX ADMIN — CAFFEINE 200 MG: 200 TABLET ORAL at 11:57

## 2025-02-05 RX ADMIN — CYCLOBENZAPRINE HYDROCHLORIDE 10 MG: 10 TABLET, FILM COATED ORAL at 12:21

## 2025-02-05 RX ADMIN — METOCLOPRAMIDE 10 MG: 10 TABLET ORAL at 12:21

## 2025-02-05 NOTE — ED PROVIDER NOTES
[facility] EMERGENCY DEPARTMENT  Emergency Department Encounter  Emergency Medicine      Pt Name:Celia Dinero  MRN: [id number]  Birthdate 2000  Date of evaluation: 2/5/25  PCP:  Agnes Willams MD  11:20 AM EST      CHIEF COMPLAINT       Chief Complaint   Patient presents with    Headache     Had failed blood patch       HISTORY OF PRESENT ILLNESS  (Location/Symptom, Timing/Onset, Context/Setting, Quality, Duration, Modifying Factors, Severity.)      Celia Dinero is a 24 y.o. female who presents with Tylenol, motrin at 10 AM, and flexeril today.  HA ongoing since initial \"failed epidural\". Patient did have another epidural which was successful.  She continued to have HA post partum, and did get a blood patch, but concern that it was no successful, she report initially Nuñez was doing better with caffeine but she has not been taking that and was no prescribed it,  Was discharged yesterday but came back due to continued HA.  No leg swelling, bleeding is minimal  Patient is breast feeding.   No fevers,  No vision change, no chest pain, no shortness of breath  Blood patch with multiple attempts in different spaces, and then told needed more blood but Iv was already out, and anesthesia not hopeful it worked, and told if was going to try again would need IR          PAST MEDICAL / SURGICAL / SOCIAL / FAMILY HISTORY      has a past medical history of ADHD (attention deficit hyperactivity disorder), Anxiety, Depression, Headache, Psoriasis, Psoriasis, and Psoriatic arthritis (HCC).       has a past surgical history that includes [place] tooth extraction.      Social History     Socioeconomic History    Marital status: Single     Spouse name: Not on file    Number of children: Not on file    Years of education: Not on file    Highest education level: Not on file   Occupational History    Not on file   Tobacco Use    Smoking status: Former     Current packs/day: 0.00     Average packs/day: 0.1 packs/day for 2.4

## 2025-02-05 NOTE — DISCHARGE INSTRUCTIONS
Please take medication as prescribed, follow-up with your OB/GYN for scheduled postpartum visit, or return to the ER for worsening headache, vomiting, numbness, tingling or weakness, or vision changes.

## 2025-02-05 NOTE — TELEPHONE ENCOUNTER
Pt called in and she delivered 02/02/25 , pt has some concerns of migraines from failed epidural , pt wants to know about some caffeine pills , please call pt back [phone]

## 2025-02-05 NOTE — CONSULTS
OB/GYN Consult  [facility]    Patient Name: Celia Dinero     Patient : 2000  Room/Bed: 18  Admission Date/Time: 2025 10:53 AM  Primary Care Physician: Agnes Willams MD    Consulting Provider: Dr. Nuñez  Reason for Consult: Postpartum Headache     CC:   Chief Complaint   Patient presents with    Headache     Had failed blood patch                HPI: Celia Dinero is a 24 y.o. female  presents to the emergency department for headache. Patient is PPD#3 s/p uncomplicated . Patient's pregnancy was complicated by diagnosis of gHTN while in labor. PreE labs at that time were wnl. P/C 0.20. Patient's postpartum course was complicated by HA as well, which improved greatly with caffeine. Anesthesia assessed patient on PPD#2 and blood patch was placed at that time. Patient is now returning to ED with return of HA.  Patient states that the headache is the exact same headache that she had prior to blood patch placement.  Patient states that headache has caused significant distress in caring for her baby at home.  Patient has attempted Motrin, Tylenol, Flexeril, caffeine at home without relief of symptoms.     While in the ED, patient's initial BP noted to be slightly elevated, but patient then remained normotensive. VS otherwise stable. CBC, CMP completed and within normal limits.  Patient declined to provide urine sample, she says she does not want to void.  Protein creatinine ratio not obtained at this time.  Blood pressure remained normotensive.  Patient received multiple medications for headache while in emergency department including caffeine, Flexeril, mag oxide, Roxicodone, Benadryl and Reglan.  Patient reports headache cocktail has mostly resolved at this time.  Patient states that she is aware that her positional headache is consistent with a spinal headache and return of headache is consistent with failed blood patch.  However, patient is adamant that she is

## 2025-02-05 NOTE — TELEPHONE ENCOUNTER
Care Transitions Initial Follow Up Call    Outreach made within 2 business days of discharge: Yes    Patient: Celia Dinero Patient : 2000   MRN: [id number]  Reason for Admission:    Discharge Date: 25            Discharge department/facility:  2025 - present  [facility] Emergency Department     Chelsea Nuñez, DO  Last attending  Treatment team Nonintractable headache, unspecified chronicity pattern, unspecified headache type  Clinical impression Headache   Chief complaint        Additional needs identified to be addressed with provider  PT IS STILL PRESENT IN HOSPITAL             Scheduled appointment with PCP within 7-14 days    Follow Up  Future Appointments   Date Time Provider Department Center   2025 10:15 AM SCHEDULE, NURSE [facility] MAURIEL Clarkson OB/GYN [facility] OB/Gyn [facility]   3/6/2025  9:15 AM Agnes Willams MD fp sc [facility] DEP       Malorie Marrero

## 2025-02-06 ENCOUNTER — TELEPHONE (OUTPATIENT)
Dept: FAMILY MEDICINE CLINIC | Age: 25
End: 2025-02-06

## 2025-02-06 PROBLEM — O99.820 GBS (GROUP B STREPTOCOCCUS CARRIER), +RV CULTURE, CURRENTLY PREGNANT: Status: RESOLVED | Noted: 2025-01-13 | Resolved: 2025-02-06

## 2025-02-06 LAB
EKG ATRIAL RATE: 81 BPM
EKG P AXIS: 59 DEGREES
EKG P-R INTERVAL: 130 MS
EKG Q-T INTERVAL: 360 MS
EKG QRS DURATION: 76 MS
EKG QTC CALCULATION (BAZETT): 418 MS
EKG R AXIS: 22 DEGREES
EKG T AXIS: 39 DEGREES
EKG VENTRICULAR RATE: 81 BPM

## 2025-02-06 NOTE — TELEPHONE ENCOUNTER
Care Transitions Initial Follow Up Call    Outreach made within 2 business days of discharge: Yes    Patient: Celia Dinero Patient : 2000   MRN: [id number]  Reason for Admission: headahce   Discharge Date: 25       Spoke with: no answer     Discharge department/facility:     [facility] Interactive Patient Contact:  Was patient able to fill all prescriptions:   Was patient instructed to bring all medications to the follow-up visit:   Is patient taking all medications as directed in the discharge summary?   Does patient understand their discharge instructions:   Does patient have questions or concerns that need addressed prior to 7-14 day follow up office visit:     Additional needs identified to be addressed with provider               Follow Up  Future Appointments   Date Time Provider Department Center   2025 10:15 AM SCHEDULE, NURSE [facility] RODNEY HONG OB/GYN [facility] OB/Gyn [facility]   3/6/2025  9:15 AM Agnes Willams MD fp sc [facility]       Luci Encarnacion MA

## 2025-02-06 NOTE — ANESTHESIA POSTPROCEDURE EVALUATION
Department of Anesthesiology  Postprocedure Note    Patient: Celia Dinero  MRN: [id number]  YOB: 2000  Date of evaluation: 2/6/2025    Procedure Summary       Date: 02/01/25 Room / Location:     Anesthesia Start: 2203 Anesthesia Stop: 02/02/25 0406    Procedure: Labor Analgesia Diagnosis:     Scheduled Providers:  Responsible Provider: Latonia Alcantar MD    Anesthesia Type: epidural ASA Status: 3            Anesthesia Type: No value filed.    Unique Phase I:      Unique Phase II:      Anesthesia Post Evaluation    Patient location during evaluation: floor  Patient participation: complete - patient participated  Level of consciousness: awake  Airway patency: patent  Nausea & Vomiting: no nausea and no vomiting  Cardiovascular status: blood pressure returned to baseline  Respiratory status: acceptable  Hydration status: euvolemic  Comments: Adequate recovery from labor epidural.     Had symptoms consistent with PDPH  -   Received blood patch with good resolution of headaches -    Neck stiffness improved on Flexeril   then discharged home.      No notable events documented.

## 2025-02-07 ENCOUNTER — TELEPHONE (OUTPATIENT)
Dept: FAMILY MEDICINE CLINIC | Age: 25
End: 2025-02-07

## 2025-02-07 NOTE — TELEPHONE ENCOUNTER
[facility] ED Follow up Call    Reason for ED visit:       2/5/2025 (4 hours)  [facility] Emergency Department     Chelsea Nuñez R, DO  Last attending  Treatment team Nonintractable headache, unspecified chronicity pattern, unspecified headache type  Clinical impression Headache   Chief complaint       JANELLE Dick appts/Provider:      Future Appointments   Date Time Provider Department Center   2/10/2025 11:15 AM Reina Sheriff APRN - CNP M [place] OB/G[facility]   3/6/2025  9:15 AM Agnes Willams MD [facility] DEP       VOICEMAIL DOCUMENTATION - ERASE IF NOT USED  Hi, this message is for  Celia    This is Malorie Marrero from [facility] office. Just calling to see how you are doing after your recent visit to the Emergency Room. [facility] wants to make sure you were able to fill any prescriptions and that you understand your discharge instructions. Please return our call if you need to make a follow up appointment with your provider or have any further needs.   Our phone number is [phone].     Have a great day!

## 2025-02-10 ENCOUNTER — HOSPITAL ENCOUNTER (INPATIENT)
Age: 25
LOS: 2 days | Discharge: HOME | DRG: 776 | End: 2025-02-12
Attending: EMERGENCY MEDICINE | Admitting: STUDENT IN AN ORGANIZED HEALTH CARE EDUCATION/TRAINING PROGRAM
Payer: COMMERCIAL

## 2025-02-10 ENCOUNTER — OFFICE VISIT (OUTPATIENT)
Dept: OBGYN CLINIC | Age: 25
End: 2025-02-10

## 2025-02-10 VITALS
HEIGHT: 64 IN | DIASTOLIC BLOOD PRESSURE: 98 MMHG | WEIGHT: 166 LBS | SYSTOLIC BLOOD PRESSURE: 148 MMHG | BODY MASS INDEX: 28.34 KG/M2

## 2025-02-10 PROBLEM — Z3A.39 39 WEEKS GESTATION OF PREGNANCY: Status: RESOLVED | Noted: 2025-02-01 | Resolved: 2025-02-10

## 2025-02-10 PROBLEM — O09.90 HIGH RISK PREGNANCY, ANTEPARTUM: Status: RESOLVED | Noted: 2024-08-21 | Resolved: 2025-02-10

## 2025-02-10 LAB
ALBUMIN SERPL-MCNC: 4 G/DL (ref 3.5–5.2)
ALBUMIN/GLOB SERPL: 1.2 {RATIO} (ref 1–2.5)
ALP SERPL-CCNC: 163 U/L (ref 35–104)
ALT SERPL-CCNC: 25 U/L (ref 10–35)
ANION GAP SERPL CALCULATED.3IONS-SCNC: 13 MMOL/L (ref 9–16)
AST SERPL-CCNC: 19 U/L (ref 10–35)
BACTERIA URNS QL MICRO: NORMAL
BASOPHILS # BLD: 0.08 K/UL (ref 0–0.2)
BASOPHILS NFR BLD: 1 % (ref 0–2)
BILIRUB SERPL-MCNC: 0.2 MG/DL (ref 0–1.2)
BILIRUB UR QL STRIP: NEGATIVE
BUN SERPL-MCNC: 10 MG/DL (ref 6–20)
CALCIUM SERPL-MCNC: 9.3 MG/DL (ref 8.6–10.4)
CASTS #/AREA URNS LPF: NORMAL /LPF (ref 0–8)
CHLORIDE SERPL-SCNC: 105 MMOL/L (ref 98–107)
CLARITY UR: CLEAR
CO2 SERPL-SCNC: 22 MMOL/L (ref 20–31)
COLOR UR: YELLOW
CREAT SERPL-MCNC: 0.8 MG/DL (ref 0.6–0.9)
CREAT UR-MCNC: 75.4 MG/DL (ref 28–217)
EKG ATRIAL RATE: 81 BPM
EKG P AXIS: 59 DEGREES
EKG P-R INTERVAL: 130 MS
EKG Q-T INTERVAL: 360 MS
EKG QRS DURATION: 76 MS
EKG QTC CALCULATION (BAZETT): 418 MS
EKG R AXIS: 22 DEGREES
EKG T AXIS: 39 DEGREES
EKG VENTRICULAR RATE: 81 BPM
EOSINOPHIL # BLD: 0.19 K/UL (ref 0–0.44)
EOSINOPHILS RELATIVE PERCENT: 2 % (ref 1–4)
EPI CELLS #/AREA URNS HPF: NORMAL /HPF (ref 0–5)
ERYTHROCYTE [DISTWIDTH] IN BLOOD BY AUTOMATED COUNT: 15 % (ref 11.8–14.4)
GFR, ESTIMATED: >90 ML/MIN/1.73M2
GLUCOSE SERPL-MCNC: 81 MG/DL (ref 74–99)
GLUCOSE UR STRIP-MCNC: NEGATIVE MG/DL
HCT VFR BLD AUTO: 40.2 % (ref 36.3–47.1)
HGB BLD-MCNC: 12.8 G/DL (ref 11.9–15.1)
HGB UR QL STRIP.AUTO: ABNORMAL
IMM GRANULOCYTES # BLD AUTO: 0.2 K/UL (ref 0–0.3)
IMM GRANULOCYTES NFR BLD: 2 %
KETONES UR STRIP-MCNC: NEGATIVE MG/DL
LEUKOCYTE ESTERASE UR QL STRIP: ABNORMAL
LYMPHOCYTES NFR BLD: 2.8 K/UL (ref 1.1–3.7)
LYMPHOCYTES RELATIVE PERCENT: 26 % (ref 24–43)
MCH RBC QN AUTO: 24.9 PG (ref 25.2–33.5)
MCHC RBC AUTO-ENTMCNC: 31.8 G/DL (ref 28.4–34.8)
MCV RBC AUTO: 78.2 FL (ref 82.6–102.9)
MONOCYTES NFR BLD: 0.56 K/UL (ref 0.1–1.2)
MONOCYTES NFR BLD: 5 % (ref 3–12)
NEUTROPHILS NFR BLD: 64 % (ref 36–65)
NEUTS SEG NFR BLD: 6.93 K/UL (ref 1.5–8.1)
NITRITE UR QL STRIP: NEGATIVE
NRBC BLD-RTO: 0 PER 100 WBC
PH UR STRIP: 6.5 [PH] (ref 5–8)
PLATELET # BLD AUTO: 378 K/UL (ref 138–453)
PMV BLD AUTO: 9.9 FL (ref 8.1–13.5)
POTASSIUM SERPL-SCNC: 3.6 MMOL/L (ref 3.7–5.3)
PROT SERPL-MCNC: 7.4 G/DL (ref 6.6–8.7)
PROT UR STRIP-MCNC: NEGATIVE MG/DL
RBC # BLD AUTO: 5.14 M/UL (ref 3.95–5.11)
RBC # BLD: ABNORMAL 10*6/UL
RBC #/AREA URNS HPF: NORMAL /HPF (ref 0–4)
SODIUM SERPL-SCNC: 140 MMOL/L (ref 136–145)
SP GR UR STRIP: 1.01 (ref 1–1.03)
TOTAL PROTEIN, URINE: 8 MG/DL
URINE TOTAL PROTEIN CREATININE RATIO: 0.11 (ref 0–0.2)
UROBILINOGEN UR STRIP-ACNC: NORMAL EU/DL (ref 0–1)
WBC #/AREA URNS HPF: NORMAL /HPF (ref 0–5)
WBC OTHER # BLD: 10.8 K/UL (ref 3.5–11.3)

## 2025-02-10 PROCEDURE — 6370000000 HC RX 637 (ALT 250 FOR IP)

## 2025-02-10 PROCEDURE — 80053 COMPREHEN METABOLIC PANEL: CPT

## 2025-02-10 PROCEDURE — 6360000002 HC RX W HCPCS

## 2025-02-10 PROCEDURE — 85025 COMPLETE CBC W/AUTO DIFF WBC: CPT

## 2025-02-10 PROCEDURE — 1220000000 HC SEMI PRIVATE OB R&B

## 2025-02-10 PROCEDURE — 96374 THER/PROPH/DIAG INJ IV PUSH: CPT

## 2025-02-10 PROCEDURE — 2580000003 HC RX 258

## 2025-02-10 PROCEDURE — 96375 TX/PRO/DX INJ NEW DRUG ADDON: CPT

## 2025-02-10 PROCEDURE — 99222 1ST HOSP IP/OBS MODERATE 55: CPT | Performed by: STUDENT IN AN ORGANIZED HEALTH CARE EDUCATION/TRAINING PROGRAM

## 2025-02-10 PROCEDURE — 0503F POSTPARTUM CARE VISIT: CPT | Performed by: NURSE PRACTITIONER

## 2025-02-10 PROCEDURE — 99285 EMERGENCY DEPT VISIT HI MDM: CPT

## 2025-02-10 PROCEDURE — 81001 URINALYSIS AUTO W/SCOPE: CPT

## 2025-02-10 PROCEDURE — 82570 ASSAY OF URINE CREATININE: CPT

## 2025-02-10 PROCEDURE — 84156 ASSAY OF PROTEIN URINE: CPT

## 2025-02-10 RX ORDER — CALCIUM GLUCONATE 94 MG/ML
1000 INJECTION, SOLUTION INTRAVENOUS PRN
Status: DISCONTINUED | OUTPATIENT
Start: 2025-02-10 | End: 2025-02-12 | Stop reason: HOSPADM

## 2025-02-10 RX ORDER — HYDRALAZINE HYDROCHLORIDE 20 MG/ML
5 INJECTION INTRAMUSCULAR; INTRAVENOUS ONCE
Status: DISCONTINUED | OUTPATIENT
Start: 2025-02-10 | End: 2025-02-10

## 2025-02-10 RX ORDER — SENNA AND DOCUSATE SODIUM 50; 8.6 MG/1; MG/1
1 TABLET, FILM COATED ORAL DAILY
Status: DISCONTINUED | OUTPATIENT
Start: 2025-02-10 | End: 2025-02-12 | Stop reason: HOSPADM

## 2025-02-10 RX ORDER — ACETAMINOPHEN 500 MG
1000 TABLET ORAL EVERY 6 HOURS PRN
Status: DISCONTINUED | OUTPATIENT
Start: 2025-02-10 | End: 2025-02-12 | Stop reason: HOSPADM

## 2025-02-10 RX ORDER — CYCLOBENZAPRINE HCL 10 MG
10 TABLET ORAL 3 TIMES DAILY PRN
Status: DISCONTINUED | OUTPATIENT
Start: 2025-02-10 | End: 2025-02-12 | Stop reason: HOSPADM

## 2025-02-10 RX ORDER — MAGNESIUM SULFATE HEPTAHYDRATE 40 MG/ML
4000 INJECTION, SOLUTION INTRAVENOUS ONCE
Status: COMPLETED | OUTPATIENT
Start: 2025-02-10 | End: 2025-02-10

## 2025-02-10 RX ORDER — FERROUS SULFATE 325(65) MG
325 TABLET, DELAYED RELEASE (ENTERIC COATED) ORAL 2 TIMES DAILY
Status: DISCONTINUED | OUTPATIENT
Start: 2025-02-10 | End: 2025-02-12 | Stop reason: HOSPADM

## 2025-02-10 RX ORDER — NIFEDIPINE 30 MG/1
30 TABLET, EXTENDED RELEASE ORAL DAILY
Status: DISCONTINUED | OUTPATIENT
Start: 2025-02-10 | End: 2025-02-11

## 2025-02-10 RX ORDER — SODIUM CHLORIDE 0.9 % (FLUSH) 0.9 %
5-40 SYRINGE (ML) INJECTION EVERY 12 HOURS SCHEDULED
Status: DISCONTINUED | OUTPATIENT
Start: 2025-02-10 | End: 2025-02-12 | Stop reason: HOSPADM

## 2025-02-10 RX ORDER — LANOLIN ALCOHOL/MO/W.PET/CERES
400 CREAM (GRAM) TOPICAL DAILY
Status: DISCONTINUED | OUTPATIENT
Start: 2025-02-10 | End: 2025-02-12 | Stop reason: HOSPADM

## 2025-02-10 RX ORDER — LABETALOL HYDROCHLORIDE 5 MG/ML
10 INJECTION, SOLUTION INTRAVENOUS ONCE
Status: COMPLETED | OUTPATIENT
Start: 2025-02-10 | End: 2025-02-10

## 2025-02-10 RX ORDER — IBUPROFEN 600 MG/1
600 TABLET, FILM COATED ORAL EVERY 6 HOURS PRN
Status: DISCONTINUED | OUTPATIENT
Start: 2025-02-10 | End: 2025-02-12 | Stop reason: HOSPADM

## 2025-02-10 RX ORDER — SODIUM CHLORIDE 0.9 % (FLUSH) 0.9 %
5-40 SYRINGE (ML) INJECTION PRN
Status: DISCONTINUED | OUTPATIENT
Start: 2025-02-10 | End: 2025-02-12 | Stop reason: HOSPADM

## 2025-02-10 RX ORDER — SODIUM CHLORIDE 9 MG/ML
INJECTION, SOLUTION INTRAVENOUS PRN
Status: DISCONTINUED | OUTPATIENT
Start: 2025-02-10 | End: 2025-02-12 | Stop reason: HOSPADM

## 2025-02-10 RX ORDER — METOCLOPRAMIDE HYDROCHLORIDE 5 MG/ML
10 INJECTION INTRAMUSCULAR; INTRAVENOUS ONCE
Status: COMPLETED | OUTPATIENT
Start: 2025-02-10 | End: 2025-02-10

## 2025-02-10 RX ORDER — DIPHENHYDRAMINE HYDROCHLORIDE 50 MG/ML
25 INJECTION INTRAMUSCULAR; INTRAVENOUS ONCE
Status: COMPLETED | OUTPATIENT
Start: 2025-02-10 | End: 2025-02-10

## 2025-02-10 RX ORDER — MAGNESIUM SULFATE IN WATER 40 MG/ML
2000 INJECTION, SOLUTION INTRAVENOUS ONCE
Status: DISCONTINUED | OUTPATIENT
Start: 2025-02-10 | End: 2025-02-10

## 2025-02-10 RX ORDER — ESCITALOPRAM OXALATE 10 MG/1
10 TABLET ORAL DAILY
Qty: 30 TABLET | Refills: 0 | Status: SHIPPED | OUTPATIENT
Start: 2025-02-10 | End: 2025-03-12

## 2025-02-10 RX ORDER — ESCITALOPRAM OXALATE 10 MG/1
10 TABLET ORAL DAILY
Status: DISCONTINUED | OUTPATIENT
Start: 2025-02-10 | End: 2025-02-12 | Stop reason: HOSPADM

## 2025-02-10 RX ORDER — SODIUM CHLORIDE, SODIUM LACTATE, POTASSIUM CHLORIDE, CALCIUM CHLORIDE 600; 310; 30; 20 MG/100ML; MG/100ML; MG/100ML; MG/100ML
INJECTION, SOLUTION INTRAVENOUS CONTINUOUS
Status: DISCONTINUED | OUTPATIENT
Start: 2025-02-10 | End: 2025-02-11

## 2025-02-10 RX ADMIN — FERROUS SULFATE TAB EC 325 MG (65 MG FE EQUIVALENT) 325 MG: 325 (65 FE) TABLET DELAYED RESPONSE at 15:45

## 2025-02-10 RX ADMIN — Medication 400 MG: at 15:45

## 2025-02-10 RX ADMIN — MAGNESIUM SULFATE HEPTAHYDRATE 4000 MG: 40 INJECTION, SOLUTION INTRAVENOUS at 15:30

## 2025-02-10 RX ADMIN — Medication 10 MG: at 13:57

## 2025-02-10 RX ADMIN — FERROUS SULFATE TAB EC 325 MG (65 MG FE EQUIVALENT) 325 MG: 325 (65 FE) TABLET DELAYED RESPONSE at 21:17

## 2025-02-10 RX ADMIN — SENNOSIDES AND DOCUSATE SODIUM 1 TABLET: 50; 8.6 TABLET ORAL at 15:45

## 2025-02-10 RX ADMIN — METOCLOPRAMIDE HYDROCHLORIDE 10 MG: 5 INJECTION INTRAMUSCULAR; INTRAVENOUS at 13:56

## 2025-02-10 RX ADMIN — IBUPROFEN 600 MG: 600 TABLET ORAL at 15:45

## 2025-02-10 RX ADMIN — ACETAMINOPHEN 1000 MG: 500 TABLET, FILM COATED ORAL at 15:45

## 2025-02-10 RX ADMIN — DIPHENHYDRAMINE HYDROCHLORIDE 25 MG: 50 INJECTION INTRAMUSCULAR; INTRAVENOUS at 13:56

## 2025-02-10 RX ADMIN — SODIUM CHLORIDE, POTASSIUM CHLORIDE, SODIUM LACTATE AND CALCIUM CHLORIDE: 600; 310; 30; 20 INJECTION, SOLUTION INTRAVENOUS at 15:24

## 2025-02-10 RX ADMIN — MAGNESIUM SULFATE HEPTAHYDRATE 2000 MG/HR: 40 INJECTION, SOLUTION INTRAVENOUS at 15:51

## 2025-02-10 RX ADMIN — NIFEDIPINE 30 MG: 30 TABLET, FILM COATED, EXTENDED RELEASE ORAL at 14:21

## 2025-02-10 ASSESSMENT — LIFESTYLE VARIABLES
HOW MANY STANDARD DRINKS CONTAINING ALCOHOL DO YOU HAVE ON A TYPICAL DAY: PATIENT DOES NOT DRINK
HOW OFTEN DO YOU HAVE A DRINK CONTAINING ALCOHOL: NEVER

## 2025-02-10 ASSESSMENT — ENCOUNTER SYMPTOMS
GASTROINTESTINAL NEGATIVE: 1
EYES NEGATIVE: 1
RESPIRATORY NEGATIVE: 1
ALLERGIC/IMMUNOLOGIC NEGATIVE: 1

## 2025-02-10 ASSESSMENT — PAIN SCALES - GENERAL
PAINLEVEL_OUTOF10: 0
PAINLEVEL_OUTOF10: 1

## 2025-02-10 ASSESSMENT — PAIN DESCRIPTION - LOCATION: LOCATION: VAGINA

## 2025-02-10 ASSESSMENT — PAIN - FUNCTIONAL ASSESSMENT: PAIN_FUNCTIONAL_ASSESSMENT: 0-10

## 2025-02-10 NOTE — PROGRESS NOTES
Celia Dinero  2/10/2025  11:55 AM        Celia Dinero is a 24 y.o. female       The patient was seen. She has no chief complaints today. She delivered vaginally on 2025. She is  breast feeding and there is not any signs or symptoms of mastitis.  The patient completed the E.P.D.S. Evaluation form and scored 21.  She does have any signs or symptoms of post partum depression. She denies any suicidal thoughts with a plan, intent to harm others, and delusional ideas.  Blood pressure elevated in office today. 140/102, rechecked 148/98. Patient reports has had a headache since delivery. Hx of spinal headache with failed blood patch. Reports has been checking her blood pressures at home past couple of days and they were elevated, 140s-150s/ 90-100s. Denies blurred vision or epigastric pain. Minimal swelling noted to extremities. Agrees to go over to ER for evaluation of preeclampsia. 2+ deep tendon reflex.  Today her lochia is light she denies any dizziness or shortness of breath.      Her pregnancy was complicated by:  Patient Active Problem List    Diagnosis Date Noted    Attention deficit hyperactivity disorder (ADHD), combined type 2022     Priority: Medium    Chronic major depressive disorder, recurrent episode (HCC) 2022     Priority: Medium    PTSD (post-traumatic stress disorder) 2022     Priority: Medium     25 F Apg 8/9 Wt 6#13 2025    Normal labor and delivery 2025    39 weeks gestation of pregnancy 2025    gHTN (G2) 2025    High risk pregnancy, antepartum 2024    Weight loss 2024    Arrington's neuroma of right foot 2024    Anxiety 2022    Plaque psoriasis 2022         She does admit to having good home support.      OB History    Para Term  AB Living   2 1 1 0 1 1   SAB IAB Ectopic Molar Multiple Live Births   0 0 0 0 0 1      # Outcome Date GA Lbr Fabio/2nd Weight Sex Type Anes PTL Lv   2 Term 25

## 2025-02-10 NOTE — ED NOTES
Pt presents to ED with c/o hypertension 1 week PP.  Pt reports she was at her OB follow up appointment and her OBGYN instructed her to come to the ER for preeclampsia rule out.  Patient alert and oriented x4, talking in complete sentences. Respirations even and unlabored. Call light in reach, all needs met at this time.    
Report called to L&D.   
Transparent 02/10/25 1329   Dressing Intervention New 02/10/25 1329       Ambulatory Status:  Presents to emergency department  because of falls (Syncope, seizure, or loss of consciousness): No, Age > 70: No, Altered Mental Status, Intoxication with alcohol or substance confusion (Disorientation, impaired judgment, poor safety awaremess, or inability to follow instructions): No, Impaired Mobility: Ambulates or transfers with assistive devices or assistance; Unable to ambulate or transer.: No, Nursing Judgement: No    Diagnosis:  DISPOSITION Admitted 02/10/2025 02:35:54 PM   Final diagnoses:   None        Consults:  IP CONSULT TO OB GYN     Treatment Team:   Treatment Team:   Sameer Vences DO Pina, Sergio E, MD Gurney, Ashley, RN Maloy, Kathryn S, MD    Treatment:  ED Course as of 02/10/25 1437   Mon Feb 10, 2025   1338 Initial BL at 1320 Severely elevated at 156/104 [WK]   1350 Repeat BP now showing 145/105 still elevated pt initially had Mg++ ordered but OB holding at this time.  Pt has a slight HA at this time was taking Ibuprofen and APAP.   [WK]   1356 OB is a where the patient they are placing orders at this time adjusting orders awaiting call to discuss patient at this time as well they are aware of the patient as they sent her in [WK]   1357 OB/GYN consulted, orders being placed by OB/GYN team along with medication adjustment. [SP]      ED Course User Index  [SP] Billy Elliott MD  [WK] Fabian Abdi DO          Skin Assessment:        Pain Score:  Pain Assessment  Pain Assessment: 0-10  Pain Level: 0      SOCIAL HISTORY       Social History     Socioeconomic History    Marital status: Single   Tobacco Use    Smoking status: Former     Current packs/day: 0.00     Average packs/day: 0.1 packs/day for 2.4 years (0.2 ttl pk-yrs)     Types: Cigarettes     Start date: 12/1/2019     Quit date: 3/16/2021     Years since quitting: 3.9    Smokeless tobacco: Never    Tobacco comments:     I do not smoke or

## 2025-02-10 NOTE — ED PROVIDER NOTES
[facility]     Emergency Department     Faculty Note/ Attestation      Pt Name: Celia Dinero                                       MRN: [id number]  Birthdate 2000  Date of evaluation: 2/10/2025  Note Started: 6:41 PM EST    Patients PCP:    Agnes Willams MD    Attestation  I performed a history and physical examination of the patient and discussed management with the resident. I reviewed the resident’s note and agree with the documented findings and plan of care. Any areas of disagreement are noted on the chart. I was personally present for the key portions of any procedures. I have documented in the chart those procedures where I was not present during the key portions. I have reviewed the emergency nurses triage note. I agree with the chief complaint, past medical history, past surgical history, allergies, medications, social and family history as documented unless otherwise noted below.    For Physician Assistant/ Nurse Practitioner cases/documentation I have personally evaluated this patient and have completed at least one if not all key elements of the E/M (history, physical exam, and MDM). Additional findings are as noted.    Initial Screens:        [place] Coma Scale  Eye Opening: Spontaneous  Best Verbal Response: Oriented  Best Motor Response: Obeys commands  [place] Coma Scale Score: 15    Vitals:    Vitals:    02/10/25 1645 02/10/25 1708 02/10/25 1807 02/10/25 1812   BP: 125/76 127/79 (!) 131/90 131/83   Pulse: 67 72 92 85   Resp: 16 16     Temp:       TempSrc:       SpO2: 100% 100% 99% 99%       CHIEF COMPLAINT       Chief Complaint   Patient presents with    Hypertension       The pt is a pleasant 25 YO F  days post partum and having a slight HA not as bad as her post lumbar spinal HA but present the tp was found to be hypertensive for the follow up visit and remains in the pre eclamptic range nearly an hour later with repeat pressurs over 90 and 100s diastolic

## 2025-02-10 NOTE — H&P
SYSTEMS:   A minimum of an eleven point review of systems was completed.    Constitutional: negative fever, negative chills  HEENT: negative visual disturbances, + headaches  Respiratory: negative dyspnea, negative cough  Cardiovascular: negative chest pain,  negative palpitations  Gastrointestinal: negative abdominal pain, negative RUQ pain, negative N/V, negative diarrhea, negative constipation  Genitourinary: negative dysuria, negative vaginal discharge, negative vaginal bleeding  Dermatological: negative rash, negative wounds  Hematologic: negative bleeding/clotting disorder  Immunologic: negative recent illness, negative recent sick contact, negative allergic reactions  Lymphatic: negative lymph nodes  Musculoskeletal: negative back pain, negative myalgias, negative arthralgias  Neurological:  negative dizziness, negative weakness  Behavior/Psych: negative depression, negative anxiety    _______________________________________________________________________        OBSTETRIC HISTORY:   OB History    Para Term  AB Living   2 1 1 0 1 1   SAB IAB Ectopic Molar Multiple Live Births   0 0 0 0 0 1      # Outcome Date GA Lbr Fabio/2nd Weight Sex Type Anes PTL Lv   2 Term 25 39w5d / 01:00 3.1 kg (6 lb 13.4 oz) F Vag-Spont EPI N HECTOR      Name: Sandra Espinoza      Apgar1: 8  Apgar5: 9   1 AB                PAST MEDICAL HISTORY:   has a past medical history of ADHD (attention deficit hyperactivity disorder), Anxiety, Depression, Headache, Psoriasis, Psoriasis, and Psoriatic arthritis (HCC).    PAST SURGICAL HISTORY:   has a past surgical history that includes [place] tooth extraction.    ALLERGIES:  Allergies as of 02/10/2025 - Fully Reviewed 02/10/2025   Allergen Reaction Noted    Penicillins Anaphylaxis and Rash 2017    Amoxicillin  2022    Otezla [apremilast]  2022    Sulfamethoxazole-trimethoprim Rash 10/16/2019       MEDICATIONS:  Current Facility-Administered Medications

## 2025-02-10 NOTE — DISCHARGE SUMMARY
Gyn Discharge Summary  [facility]      Patient Name: Celia Dinero  Patient : 2000  Primary Care Physician: Agnes Willams MD  Admit Date: 2/10/2025    Principal Diagnosis: Postpartum preeclampsia with severe features    Other Diagnosis:   Preeclampsia in postpartum period [O14.95]  Patient Active Problem List   Diagnosis    Anxiety    Plaque psoriasis    PTSD (post-traumatic stress disorder)    Chronic major depressive disorder, recurrent episode (HCC)    Attention deficit hyperactivity disorder (ADHD), combined type    Weight loss    Arrington's neuroma of right foot     25 F Apg 8/ Wt 6#13    Normal labor and delivery    Preeclampsia in postpartum period    Nonintractable headache    Postpartum state    Severe pre-eclampsia, antepartum    Hypertension in pregnancy, preeclampsia, severe, delivered/postpartum       Infection: No  Hospital Acquired: No    Pertinent Findings & Procedures:   Celia Dinero is a 24 y.o. female , admitted for postpartum preeclampsia with severe features; received mag sulfate 4 g bolus followed by 2 g/h, Reglan/Benadryl, Mag-Ox. Patient started on Procardia XL 30 mg daily.  Follow up in 3 days for BP check. Discharge instructions reviewed and questions answered.    Course of patient: normal  HD#1: patient received Mag sulfate x24 hours. Neurology consult placed and recommended patient motrin/tylenol and follow up outpatient     Discharge to: Home    Readmission planned: No    Recommendations on Discharge:     Medications:     Medication List        START taking these medications      NIFEdipine 30 MG extended release tablet  Commonly known as: Procardia XL  Take 1 tablet by mouth daily            CONTINUE taking these medications      acetaminophen 500 MG tablet  Commonly known as: TYLENOL  Take 2 tablets by mouth every 6 hours as needed for Pain     Caffeine 200 MG Tabs  Commonly known as: VIVARIN  Take 1 tablet by mouth every 4 hours as

## 2025-02-10 NOTE — ED PROVIDER NOTES
[facility] POST PARTUM  Emergency Department Encounter  Emergency Medicine Resident     Pt Name:Celia Dinero  MRN: [id number]  Birthdate 2000  Date of evaluation: 2/10/25  PCP:  Agnes Willams MD  Note Started: 1:29 PM EST      CHIEF COMPLAINT       Chief Complaint   Patient presents with    Hypertension       HISTORY OF PRESENT ILLNESS  (Location/Symptom, Timing/Onset, Context/Setting, Quality, Duration, Modifying Factors, Severity.)      Celia Dinero is a 24 y.o. female with PMH of MDD, anxiety, psoriasis, ADHD, with spontaneous vaginal delivery on 2/2/2025 who presents with under recommendations of her OB/GYN for elevated blood pressures and concern for preeclampsia.  Patient states that she called her OB/GYN this morning and discussed her blood pressures from Thursday through yesterday and they recommended further evaluation in the ED.  Patient states her blood pressure on Thursday ranged from systolic 151-157/101-106.  Patient states that yesterday her blood pressure was reading 154/104.  Patient endorses 1/10 headache without any blurry vision.  Patient denies chest pain, shortness of breath.      Per chart check, patient was evaluated on 2/5/2025 for spinal headache and noted to have blood pressure of 114/78.  Patient also evaluated by OB/GYN and recommendations included admission for further workup however patient expressing desire to go home.  Patient was given strict return return precautions in regards to her blood pressure and patient verbalized understanding stating she had blood pressure cuff at home.    PAST MEDICAL / SURGICAL / SOCIAL / FAMILY HISTORY      has a past medical history of ADHD (attention deficit hyperactivity disorder), Anxiety, Depression, Headache, Psoriasis, Psoriasis, and Psoriatic arthritis (HCC).       has a past surgical history that includes [place] tooth extraction.      Social History     Socioeconomic History    Marital status: Single     Spouse name: Not on file

## 2025-02-10 NOTE — PROGRESS NOTES
OB/GYN Consult  [facility]    Patient Name: Celia Dinero     Patient : 2000  Room/Bed:   Admission Date/Time: 2/10/2025  1:15 PM  Primary Care Physician: Agnes Willams MD    Consulting Provider: Dr. Elliott  Reason for Consult: Concern for PP PreE    CC:   Chief Complaint   Patient presents with    Hypertension                HPI: Celia Dinero is a 24 y.o. female  presents to the emergency department after being sent in by her OB provider for elevated blood pressures in the clinic, in the setting of a headache.  She underwent a spontaneous vaginal delivery on 2025.  Patient reports that she has had an ongoing headache since delivery, that was initially attributed to a spinal headache.  She received a blood patch, it was ineffective at treating her symptoms.  During her delivery course, she was diagnosed with gestational hypertension, and discharged on no medication.     She returned to the emergency department on 2025 with similar complaints.  A headache cocktail resolved her symptoms, and she declined further evaluation via imaging, neurology evaluation or admission.  She was subsequently discharged, and encouraged to take blood pressures at home.  Preeclampsia labs at that time were within normal limits.  A P/C was not obtained.    Patient reports that from a postpartum standpoint she is felt well.  She does report her blood pressure has remained consistently elevated.  On presentation, blood pressure was noted to be severe range, with a repeat blood pressure that was elevated.  Emergency department physician ordered labetalol 10 mg IV x 1, and place consult to OB service.  At this time she denies any vision changes, chest pain, shortness of breath, significant lower extremity edema or right upper quadrant pain.  She does report continued headache that is mildly improved from previous presentations.      Past medical history is significant for posttraumatic

## 2025-02-11 PROBLEM — O13.9 GESTATIONAL HYPERTENSION: Status: RESOLVED | Noted: 2025-02-01 | Resolved: 2025-02-11

## 2025-02-11 PROBLEM — O14.10 SEVERE PRE-ECLAMPSIA, ANTEPARTUM: Status: ACTIVE | Noted: 2025-02-11

## 2025-02-11 PROBLEM — R51.9 ACUTE INTRACTABLE HEADACHE: Status: ACTIVE | Noted: 2025-02-11

## 2025-02-11 PROCEDURE — 99024 POSTOP FOLLOW-UP VISIT: CPT | Performed by: OBSTETRICS & GYNECOLOGY

## 2025-02-11 PROCEDURE — 6360000002 HC RX W HCPCS

## 2025-02-11 PROCEDURE — 99222 1ST HOSP IP/OBS MODERATE 55: CPT | Performed by: PSYCHIATRY & NEUROLOGY

## 2025-02-11 PROCEDURE — 1220000000 HC SEMI PRIVATE OB R&B

## 2025-02-11 PROCEDURE — 2580000003 HC RX 258

## 2025-02-11 PROCEDURE — 2500000003 HC RX 250 WO HCPCS

## 2025-02-11 PROCEDURE — 6370000000 HC RX 637 (ALT 250 FOR IP)

## 2025-02-11 RX ORDER — NIFEDIPINE 30 MG/1
30 TABLET, EXTENDED RELEASE ORAL
Status: DISCONTINUED | OUTPATIENT
Start: 2025-02-11 | End: 2025-02-12 | Stop reason: HOSPADM

## 2025-02-11 RX ORDER — METOCLOPRAMIDE HYDROCHLORIDE 5 MG/ML
10 INJECTION INTRAMUSCULAR; INTRAVENOUS ONCE
Status: COMPLETED | OUTPATIENT
Start: 2025-02-11 | End: 2025-02-11

## 2025-02-11 RX ORDER — DIPHENHYDRAMINE HYDROCHLORIDE 50 MG/ML
25 INJECTION INTRAMUSCULAR; INTRAVENOUS EVERY 6 HOURS PRN
Status: DISCONTINUED | OUTPATIENT
Start: 2025-02-11 | End: 2025-02-12 | Stop reason: HOSPADM

## 2025-02-11 RX ADMIN — ACETAMINOPHEN 1000 MG: 500 TABLET, FILM COATED ORAL at 09:34

## 2025-02-11 RX ADMIN — ESCITALOPRAM OXALATE 10 MG: 10 TABLET ORAL at 09:35

## 2025-02-11 RX ADMIN — IBUPROFEN 600 MG: 600 TABLET ORAL at 03:16

## 2025-02-11 RX ADMIN — SODIUM CHLORIDE, PRESERVATIVE FREE 10 ML: 5 INJECTION INTRAVENOUS at 20:38

## 2025-02-11 RX ADMIN — Medication 400 MG: at 09:23

## 2025-02-11 RX ADMIN — MAGNESIUM SULFATE HEPTAHYDRATE 2000 MG/HR: 40 INJECTION, SOLUTION INTRAVENOUS at 02:06

## 2025-02-11 RX ADMIN — SENNOSIDES AND DOCUSATE SODIUM 1 TABLET: 50; 8.6 TABLET ORAL at 09:23

## 2025-02-11 RX ADMIN — ACETAMINOPHEN 1000 MG: 500 TABLET, FILM COATED ORAL at 03:16

## 2025-02-11 RX ADMIN — MAGNESIUM SULFATE HEPTAHYDRATE 2000 MG/HR: 40 INJECTION, SOLUTION INTRAVENOUS at 12:25

## 2025-02-11 RX ADMIN — METOCLOPRAMIDE 10 MG: 5 INJECTION, SOLUTION INTRAMUSCULAR; INTRAVENOUS at 09:36

## 2025-02-11 RX ADMIN — SODIUM CHLORIDE, POTASSIUM CHLORIDE, SODIUM LACTATE AND CALCIUM CHLORIDE: 600; 310; 30; 20 INJECTION, SOLUTION INTRAVENOUS at 04:12

## 2025-02-11 RX ADMIN — FERROUS SULFATE TAB EC 325 MG (65 MG FE EQUIVALENT) 325 MG: 325 (65 FE) TABLET DELAYED RESPONSE at 09:23

## 2025-02-11 RX ADMIN — NIFEDIPINE 30 MG: 30 TABLET, FILM COATED, EXTENDED RELEASE ORAL at 14:37

## 2025-02-11 RX ADMIN — IBUPROFEN 600 MG: 600 TABLET ORAL at 09:35

## 2025-02-11 RX ADMIN — CYCLOBENZAPRINE 10 MG: 10 TABLET, FILM COATED ORAL at 09:36

## 2025-02-11 RX ADMIN — FERROUS SULFATE TAB EC 325 MG (65 MG FE EQUIVALENT) 325 MG: 325 (65 FE) TABLET DELAYED RESPONSE at 20:38

## 2025-02-11 RX ADMIN — IBUPROFEN 600 MG: 600 TABLET ORAL at 15:51

## 2025-02-11 RX ADMIN — ACETAMINOPHEN 1000 MG: 500 TABLET, FILM COATED ORAL at 15:51

## 2025-02-11 ASSESSMENT — PAIN DESCRIPTION - LOCATION
LOCATION: HEAD

## 2025-02-11 ASSESSMENT — PAIN - FUNCTIONAL ASSESSMENT: PAIN_FUNCTIONAL_ASSESSMENT: ACTIVITIES ARE NOT PREVENTED

## 2025-02-11 ASSESSMENT — PAIN DESCRIPTION - DESCRIPTORS
DESCRIPTORS: ACHING

## 2025-02-11 ASSESSMENT — PAIN SCALES - GENERAL
PAINLEVEL_OUTOF10: 2
PAINLEVEL_OUTOF10: 2
PAINLEVEL_OUTOF10: 3

## 2025-02-11 NOTE — CONSULTS
Symmetrical palate  XI    -     Symmetrical shoulder shrug  XII   -     Midline tongue, no atrophy    MOTOR FUNCTION: RUE: Significant for good strength of grade 5/5 in proximal and distal muscle groups   LUE: Significant for good strength of grade 5/5 in proximal and distal muscle groups   RLE: Significant for good strength of grade 5/5 in proximal and distal muscle groups   LLE: Significant for good strength of grade 5/5 in proximal and distal muscle groups      Normal bulk, normal tone and no involuntary movements, no tremor   SENSORY FUNCTION:  Normal touch, normal pinprick, normal vibration, normal proprioception   CEREBELLAR FUNCTION:  Intact fine motor control over upper limbs and lower limbs   REFLEX FUNCTION:  Symmetric in upper and lower extremities, no Babinski sign   STATION and GAIT deferred         Investigations:      Laboratory Testing:  Recent Results (from the past 24 hour(s))   Urinalysis with Reflex to Culture    Collection Time: 02/10/25  2:13 PM    Specimen: Urine   Result Value Ref Range    Color, UA Yellow Yellow    Turbidity UA Clear Clear    Glucose, Ur NEGATIVE NEGATIVE mg/dL    Bilirubin, Urine NEGATIVE NEGATIVE    Ketones, Urine NEGATIVE NEGATIVE mg/dL    Specific Gravity, UA 1.011 1.005 - 1.030    Urine Hgb SMALL (A) NEGATIVE    pH, Urine 6.5 5.0 - 8.0    Protein, UA NEGATIVE NEGATIVE mg/dL    Urobilinogen, Urine Normal 0.0 - 1.0 EU/dL    Nitrite, Urine NEGATIVE NEGATIVE    Leukocyte Esterase, Urine SMALL (A) NEGATIVE   Protein / creatinine ratio, urine    Collection Time: 02/10/25  2:13 PM   Result Value Ref Range    Total Protein, Urine 8 mg/dL    Creatinine, Ur 75.4 28.0 - 217.0 mg/dL    Urine Total Protein Creatinine Ratio 0.11 0.00 - 0.20   Microscopic Urinalysis    Collection Time: 02/10/25  2:13 PM   Result Value Ref Range    WBC, UA 5 TO 10 0 - 5 /HPF    RBC, UA 0 TO 2 0 - 4 /HPF    Casts UA  0 - 8 /LPF     0 TO 2 HYALINE Reference range defined for non-centrifuged specimen.    
Reglan/Benadryl and magnesium oxide given for headache  - Received labetalol 10 mg IV x 1 by ER physician  - Procardia XL 30 mg ordered  - Start magnesium sulfate at 4 g bolus, followed by 2 g/h for the next 24 hours  - Home meds including Lexapro, Senokot and Motrin/Tylenol ordered  - Continue to monitor blood pressures  - Regular diet ordered  - SCDs in place    Patient Active Problem List    Diagnosis Date Noted    Attention deficit hyperactivity disorder (ADHD), combined type 2022     Priority: Medium    Chronic major depressive disorder, recurrent episode (HCC) 2022     Priority: Medium    PTSD (post-traumatic stress disorder) 2022     Priority: Medium    Preeclampsia in postpartum period 02/10/2025     25 F Apg 8/9 Wt 6#13 2025    Normal labor and delivery 2025    gHTN (G2) 2025    Weight loss 2024    Arrington's neuroma of right foot 2024    Anxiety 2022    Plaque psoriasis 2022       Plan discussed with Dr. Vences, who is agreeable.     Attending's Name: Dr. Ru Reyes MD  Ob/Gyn Resident   [facility]  2/10/2025, 2:53 PM

## 2025-02-11 NOTE — PROGRESS NOTES
Resident Interval Magnesium Sulfate Note    Celia Dinero is a 24 y.o. female  PPD# 8 s/p  with concerns of Postpartum Pre E w/ SF   The patient is resting comfortably. She reports a headache she rates a 2/10 but denies visual changes, and abdominal pain in the right upper quadrant. She denies any shortness of breath or chest pain. She denies change in her extremities, regarding swelling.    Continuous Medications:    lactated ringers 75 mL/hr at 25 0412    sodium chloride      magnesium sulfate 2,000 mg/hr (25 0615)       Vitals:    Vitals:    25 0313 25 0413 25 0515 25 0613   BP: (!) 116/90 (!) 101/59 92/70 108/61   Pulse: 86 72 81 74   Resp: 16 16 16 16   Temp:   97.5 °F (36.4 °C)    TempSrc:   Oral    SpO2: 100% 98% 98% 97%       Physical Exam:  Chest: clear to auscultation bilaterally  Heart: RRR no murmur  Abdomen: soft, nontender, nondistended  Extremities: DTR normal Bilateral lower extremities Right: 2/4   Left: 2/4  Clonus: absent    Urine Output: 133mL/hr; Clear urine    Labs:  Last Magnesium Level:   Lab Results   Component Value Date/Time    MG 2.0 2024 10:31 AM       BMP:    Recent Labs     02/10/25  1328      K 3.6*      CO2 22   BUN 10   CREATININE 0.8   GLUCOSE 81       ASSESSMENT/PLAN  Celia Dinero is a 24 y.o. female  PPD# 8 s/p  with concerns of Postpartum Pre E w/ SF   - Continue Magnesium Sulfate Treatment 2g/hr, off @ 1530 on 25   - BPs mostly normotensive with periods of elevated non severe pressures    - Patient reports a 2/10 but denies other s/s PreE   - Patient declining medications for headache at this time    - Will consider neuro consult if headache unresolved after mag infusion   - UOP adequate   - PreE labs wnl, P/C 0.11   - Currently controlled on Procardia 30 mg XL    - Last IV anti-hypertensive Labetalol 10 mg on 2/10/25 @ 1357   - Continue to monitor       Lesvia Berry MD  Ob/Gyn

## 2025-02-11 NOTE — PROGRESS NOTES
Obstetrical Rounds:    POD/PPD #: 8  Hospital Day: 2  Procedure: normal spontaneous vaginal delivery    Date: 2025  Time: 10:11 AM        Patient Name: Celia Dinero  Patient : 2000  Room/Bed: [facility]/0752-01  Admission Date/Time: 2/10/2025  1:15 PM  MRN #: [id number]  [facility] #: [id number]        Attending Physician Statement  I have discussed the care of Celia Dinero, including pertinent history and exam findings,  with the resident. I have reviewed their note in the electronic medical record. I have seen and examined the patient and the key elements of all parts of the encounter have been performed/reviewed by me .  I agree with the assessment, plan and orders as documented by the resident.   Pt seen & examined. Pt states her H/A has returned. Pts BPs improved. Pt describes H/A as more frontal in nature. Will consult neurology for further recommendations.   Vitals:    25 0915   BP: 124/77   Pulse: 80   Resp: 16   Temp:    SpO2: 100%       Admission on 02/10/2025   Component Date Value Ref Range Status    WBC 02/10/2025 10.8  3.5 - 11.3 k/uL Final    RBC 02/10/2025 5.14 (H)  3.95 - 5.11 m/uL Final    Hemoglobin 02/10/2025 12.8  11.9 - 15.1 g/dL Final    Hematocrit 02/10/2025 40.2  36.3 - 47.1 % Final    MCV 02/10/2025 78.2 (L)  82.6 - 102.9 fL Final    MCH 02/10/2025 24.9 (L)  25.2 - 33.5 pg Final    MCHC 02/10/2025 31.8  28.4 - 34.8 g/dL Final    RDW 02/10/2025 15.0 (H)  11.8 - 14.4 % Final    Platelets 02/10/2025 378  138 - 453 k/uL Final    MPV 02/10/2025 9.9  8.1 - 13.5 fL Final    NRBC Automated 02/10/2025 0.0  0.0 per 100 WBC Final    Neutrophils % 02/10/2025 64  36 - 65 % Final    Lymphocytes % 02/10/2025 26  24 - 43 % Final    Monocytes % 02/10/2025 5  3 - 12 % Final    Eosinophils % 02/10/2025 2  1 - 4 % Final    Basophils % 02/10/2025 1  0 - 2 % Final    Immature Granulocytes % 02/10/2025 2 (H)  0 % Final    Neutrophils Absolute 02/10/2025 6.93  1.50 - 8.10 k/uL Final    Lymphocytes

## 2025-02-11 NOTE — PROGRESS NOTES
Resident Interval Magnesium Sulfate Note    Celia Dinero is a 24 y.o. female  PPD# 8 s/p   The patient is resting comfortably. She reports mild headache, improved from prior. Denies vision changes, RUQ abdominal pain, or leg swelling. She denies any shortness of breath or chest pain. She denies change in her extremities, regarding swelling.    Continuous Medications:    lactated ringers 75 mL/hr at 25 0412    sodium chloride      magnesium sulfate 2,000 mg/hr (25 0709)       Vitals:    Vitals:    25 0815 25 0915 25 1015 25 1115   BP: 126/83 124/77 (!) 110/56 111/72   Pulse: 90 80 84 90   Resp: 16 16 16 16   Temp: 97.5 °F (36.4 °C)      TempSrc: Oral      SpO2: 100% 100% 99% 97%         Physical Exam:  Chest: clear to auscultation bilaterally  Heart: RRR no murmur  Abdomen: soft, nontender, nondistended  Extremities: DTR normal Bilateral lower extremities Right: 2+/4   Left: 2+/4  Clonus: absent    Urine Output: 191/hr; Clear urine    Labs:  Last Magnesium Level:   Lab Results   Component Value Date/Time    MG 2.0 2024 10:31 AM       BMP:    Recent Labs     02/10/25  1328      K 3.6*      CO2 22   BUN 10   CREATININE 0.8   GLUCOSE 81       ASSESSMENT/PLAN  Celia Dinero is a 24 y.o. female  PPD# 8 s/p  with concerns of postpartum pre-e w/ SF   - Continue Magnesium Sulfate Treatment 2g/hr, off @ 1530 on 25   - BPs normotensive    - Patient denies mild headache    - UOP adequate   - PreE labs wnl, P/C 0.11   - Currently controlled on procardia xl 30 mg   - Last IV anti-hypertensive labetalol 10 mg on 2/10/25 @1357    - continue to monitor     Christi Eckert MD  Ob/Gyn Resident  2025, 12:20 PM

## 2025-02-11 NOTE — PROGRESS NOTES
Resident Interval Magnesium Sulfate Note    Celia Dinero is a 24 y.o. female  PP D# 8 s/p , with concern for postpartum PreE w/ SF   The patient is resting comfortably. She continues to have a slight headache and some dizziness and denies visual changes, abdominal pain in the left upper quadrant, vaginal bleeding, nausea, vomiting, backache, and dysuria. She denies any shortness of breath or chest pain. She denies change in her extremities, regarding swelling.    Continuous Medications:    lactated ringers 75 mL/hr at 02/10/25 1524    sodium chloride      magnesium sulfate 2,000 mg/hr (02/10/25 1551)       Vitals:    Vitals:    02/10/25 2116   BP: 118/69   Pulse: 80   Resp:    Temp: 97.3 °F (36.3 °C)   SpO2: 99%       Physical Exam:  Chest: clear to auscultation bilaterally  Heart: RRR no murmur  Abdomen: soft, nontender, nondistended  Extremities: DTR normal Bilateral upper and lower extremities Right: 2/4   Left: 2/4  Clonus: absent    Urine Output: 108 ml/hr , clear urine     Labs:  Last Magnesium Level:   Lab Results   Component Value Date/Time    MG 2.0 2024 10:31 AM       BMP:    Recent Labs     02/10/25  1328      K 3.6*      CO2 22   BUN 10   CREATININE 0.8   GLUCOSE 81       ASSESSMENT/PLAN  Celia Dinero is a 24 y.o. female  PP D# 8 s/p , with concern for postpartum PreE w/ SF    - Continue Magnesium Sulfate Treatment 2g/hr, off @ 1530 on    - BPs controlled with systolic 120-130's    - Patient denies any s/s PreE   - UOP adequate   - PreE labs wnl, P/C 0.11   - Currently controlled on Procardia XL 30 mg QD   - Last IV anti-hypertensive (labatelol 10 mg IV, dose given at 1357 on 2/10)  -continue to monitor     Buck Drew MD  Ob/Gyn Resident  2/10/2025, 9:29 PM

## 2025-02-11 NOTE — CARE COORDINATION
CASE MANAGEMENT POST-PARTUM TRANSITIONAL CARE PLAN    Preeclampsia in postpartum period [O14.95]    OB Provider: Dr. Salazar    Writer met w/ Celia, her  and  baby at her bedside to discuss DCP. She is S/P  on 2025 and DC home on 25.     She was instructed to go to ED after having her post partum follow up visit w/ Reina Sheriff CNP at OB office due to elevated BPs and HA.     She was admitted for Post Partum PreE and IV Magnesium therapy     Writer verified address,her phone number and emergency contacts are all correct on facesheet.. She states she lives with her mom and dad, baby and they stay with FOB at times.  She denied barriers with transportation home, to doctor's appointments or with paying for medications upon discharge home.      [employer] insurance correct.      DME: BP cuff   HOME CARE: no    Anticipate DC home after IV magnesium completed and patient is hemodynamically stable.    BS RISK OF UNPLANNED READMISSION 2.0             7.4 Total Score

## 2025-02-11 NOTE — PROGRESS NOTES
Resident Interval Magnesium Sulfate/Rounding Note    Celia Dinero is a 24 y.o. female  PPD# 8 s/p  with concern of postpartum PreE   The patient is resting comfortably. She reports a headache she rates a 2/10 but denies  visual changes, and abdominal pain in the right upper quadrant. She denies any shortness of breath or chest pain. She denies change in her extremities, regarding swelling.    Today she is doing well without any chief complaint. Her lochia is light. She denies chest pain, shortness of breath, headache, lightheadedness and blurred vision. She is breast feeding and she denies any breast tenderness. She is ambulating well. Her voiding pattern is normal. I reviewed signs and symptoms of post partum depression with the patient, she currently denies any of these symptoms. She is tolerating solids.    Continuous Medications:    lactated ringers 75 mL/hr at 02/10/25 2215    sodium chloride      magnesium sulfate 2,000 mg/hr (25 0206)       Vitals:    Vitals:    25 0313 25 0413 25 0515 25 0613   BP: (!) 116/90 (!) 101/59 92/70 108/61   Pulse: 86 72 81 74   Resp: 16 16 16 16   Temp:   97.5 °F (36.4 °C)    TempSrc:   Oral    SpO2: 100% 98% 98% 97%       Physical Exam:  General:  no apparent distress, alert and cooperative  Neurologic:  alert, oriented, normal speech, no focal findings or movement disorder noted  Lungs:  No increased work of breathing, good air exchange, clear to auscultation bilaterally, no crackles or wheezing  Heart:  Normal apical impulse, regular rate and rhythm, normal S1 and S2, no S3 or S4, and no murmur noted    Abdomen: abdomen soft, non-distended, non-tender  Fundus: non-tender, firm, below umbilicus  Extremities: DTR normal Bilateral lower extremities Right: 2/4   Left: 2/4  Clonus: absent    Urine Output: 133mL/hr; Clear urine    Labs:  Last Magnesium Level:   Lab Results   Component Value Date/Time    MG 2.0 2024 10:31 AM       BMP:

## 2025-02-11 NOTE — CARE COORDINATION
02/11/25 1037   Readmission Assessment   Number of Days since last admission? 1-7 days   Previous Disposition Home with Family   Who is being Interviewed Patient   What was the patient's/caregiver's perception as to why they think they needed to return back to the hospital? Other (Comment)  (Post Partum PreE)   Did you visit your Primary Care Physician after you left the hospital, before you returned this time? No   Why weren't you able to visit your PCP? Other (Comment)  (N/A)   Did you see a specialist, such as Cardiac, Pulmonary, Orthopedic Physician, etc. after you left the hospital? Yes   Who advised the patient to return to the hospital? Physician   Does the patient report anything that got in the way of taking their medications? No   In our efforts to provide the best possible care to you and others like you, can you think of anything that we could have done to help you after you left the hospital the first time, so that you might not have needed to return so soon? Other (Comment)  (Unavoidable post partum complication)

## 2025-02-11 NOTE — PROGRESS NOTES
Resident Interval Magnesium Sulfate Note    Celia Dinero is a 24 y.o. female  PPD#8  s/p   The patient is resting comfortably. She denies headache, visual changes, and abdominal pain in the right upper quadrant. She denies any shortness of breath or chest pain. She denies change in her extremities, regarding swelling.    Continuous Medications:    sodium chloride         Vitals:    Vitals:    25 1115 25 1215 25 1315 25 1415   BP: 111/72 107/72 (!) 98/55 114/77   Pulse: 90 64 71 89   Resp: 16 16 16 16   Temp:  97.5 °F (36.4 °C)     TempSrc:  Oral     SpO2: 97% 98% 98% 98%        Physical Exam:  Chest: clear to auscultation bilaterally  Heart: RRR no murmur  Abdomen: soft, nontender, nondistended  Extremities: DTR bilateral lower extremities Right: 2/4   Left: 2/4  Clonus: absent    Urine Output: 175/hr in 6 hrs; Clear urine    Labs:  Last Magnesium Level:   Lab Results   Component Value Date/Time    MG 2.0 2024 10:31 AM       BMP:    Recent Labs     02/10/25  1328      K 3.6*      CO2 22   BUN 10   CREATININE 0.8   GLUCOSE 81       ASSESSMENT/PLAN  Celia Dinero is a 24 y.o. female  PPD# 8 s/p    - Continue Magnesium Sulfate Treatment 2g/hr, off @ 1530 on 25   - No Mag levels q6hrs per provider   - BPs normotensive   - Patient denies any s/s PreE   - UOP adequate   - PreE labs wnl, P/C 0.11   - Currently controlled on Procardia XL 30mg QD    - Last IV anti-hypertensive Labetalol 10mg IV x1 on 2/10/25 @ 1357   - Continue to monitor    Luci Narayanan DO  Ob/Gyn Resident  2025, 3:43 PM

## 2025-02-12 VITALS
OXYGEN SATURATION: 99 % | HEART RATE: 69 BPM | SYSTOLIC BLOOD PRESSURE: 123 MMHG | DIASTOLIC BLOOD PRESSURE: 73 MMHG | RESPIRATION RATE: 16 BRPM | TEMPERATURE: 98.2 F

## 2025-02-12 PROCEDURE — 6370000000 HC RX 637 (ALT 250 FOR IP)

## 2025-02-12 PROCEDURE — 2500000003 HC RX 250 WO HCPCS

## 2025-02-12 RX ORDER — NIFEDIPINE 30 MG/1
30 TABLET, EXTENDED RELEASE ORAL DAILY
Qty: 90 TABLET | Refills: 1 | Status: SHIPPED | OUTPATIENT
Start: 2025-02-12

## 2025-02-12 RX ORDER — NIFEDIPINE 30 MG/1
30 TABLET, EXTENDED RELEASE ORAL DAILY
Qty: 30 TABLET | Refills: 0 | Status: SHIPPED | OUTPATIENT
Start: 2025-02-12 | End: 2025-02-12 | Stop reason: HOSPADM

## 2025-02-12 RX ADMIN — NIFEDIPINE 30 MG: 30 TABLET, FILM COATED, EXTENDED RELEASE ORAL at 08:53

## 2025-02-12 RX ADMIN — SODIUM CHLORIDE, PRESERVATIVE FREE 10 ML: 5 INJECTION INTRAVENOUS at 08:54

## 2025-02-12 RX ADMIN — FERROUS SULFATE TAB EC 325 MG (65 MG FE EQUIVALENT) 325 MG: 325 (65 FE) TABLET DELAYED RESPONSE at 08:53

## 2025-02-12 RX ADMIN — Medication 400 MG: at 08:53

## 2025-02-12 RX ADMIN — SENNOSIDES AND DOCUSATE SODIUM 1 TABLET: 50; 8.6 TABLET ORAL at 08:53

## 2025-02-12 RX ADMIN — ESCITALOPRAM OXALATE 10 MG: 10 TABLET ORAL at 08:53

## 2025-02-12 RX ADMIN — ACETAMINOPHEN 1000 MG: 500 TABLET, FILM COATED ORAL at 06:36

## 2025-02-12 ASSESSMENT — PAIN DESCRIPTION - DESCRIPTORS: DESCRIPTORS: CRAMPING

## 2025-02-12 ASSESSMENT — PAIN SCALES - GENERAL
PAINLEVEL_OUTOF10: 1
PAINLEVEL_OUTOF10: 0

## 2025-02-12 ASSESSMENT — PAIN DESCRIPTION - ORIENTATION: ORIENTATION: MID;LOWER

## 2025-02-12 ASSESSMENT — PAIN - FUNCTIONAL ASSESSMENT: PAIN_FUNCTIONAL_ASSESSMENT: ACTIVITIES ARE NOT PREVENTED

## 2025-02-12 ASSESSMENT — PAIN DESCRIPTION - LOCATION: LOCATION: ABDOMEN

## 2025-02-12 NOTE — PLAN OF CARE
Problem: Pain  Goal: Verbalizes/displays adequate comfort level or baseline comfort level  2/11/2025 0417 by Yenny Obando RN  Outcome: Progressing  2/10/2025 1719 by Courtney Allred RN  Outcome: Progressing     Problem: Safety - Adult  Goal: Free from fall injury  2/11/2025 0417 by Yenny Obando RN  Outcome: Progressing  2/10/2025 1719 by Courtney Allred RN  Outcome: Progressing     Problem: Risk for Maternal Injury  Goal: Remains free from seizures and injury related to seizure activity  Description: INTERVENTIONS:.  -Maintain airway, patient safety and administer oxygen as ordered  -Monitor patient for seizure activity, document and report duration and descrition  -If Seizure occurs, turn patient to dide and suction secretions as needed  -Reorient patient post seizure  -Seizure Pads  -Instruct patient/family to notify RN of any seizure activity  -Instruct patient/family to call for assistance with activity based on assessment  2/11/2025 0417 by Yenny Obando RN  Outcome: Progressing  2/10/2025 1719 by Courtney Allred RN  Outcome: Progressing     Problem: Risk for Decreased Cardiac Output  Goal: Maintains optimal cardiac output and hemodynamic stability  Description: INTERVENTIONS:.  -Monitor blood pressure and heart rate  -Monitor urine output and notify licensed practitioner for values outside of   -Assess for signes of decreased cardiac output  -Administer fluid and /or volume expanders as ordered    2/11/2025 0417 by Yenny Obando RN  Outcome: Progressing  2/10/2025 1719 by Courtney Allred RN  Outcome: Progressing  Goal: Achieves optimal ventilation and oxygenation  Description: INTERVENTIONS:.  -Assess for changes in respiratory status   -Assess for changes in mentation and behavior  -Position to facilitate oxygenation and minimize respiratory effort  -Oxygen supplementation based on oxygen saturation or arterial blood gases  -Initiate smoking cessation protocol as indicated  -Encourage broncho-pulmonary 
  Problem: Pain  Goal: Verbalizes/displays adequate comfort level or baseline comfort level  2/12/2025 1042 by Jolene Kirk RN  Outcome: Completed  2/12/2025 0457 by Elma Daniels RN  Outcome: Progressing     Problem: Safety - Adult  Goal: Free from fall injury  2/12/2025 1042 by Jolene Kirk RN  Outcome: Completed  2/12/2025 0457 by Elma Daniels RN  Outcome: Progressing     Problem: Risk for Maternal Injury  Goal: Remains free from seizures and injury related to seizure activity  Description: INTERVENTIONS:.  -Maintain airway, patient safety and administer oxygen as ordered  -Monitor patient for seizure activity, document and report duration and descrition  -If Seizure occurs, turn patient to dide and suction secretions as needed  -Reorient patient post seizure  -Seizure Pads  -Instruct patient/family to notify RN of any seizure activity  -Instruct patient/family to call for assistance with activity based on assessment  2/12/2025 1042 by Jolene Kirk RN  Outcome: Completed  2/12/2025 0457 by Elma Daniels RN  Outcome: Progressing     Problem: Risk for Decreased Cardiac Output  Goal: Maintains optimal cardiac output and hemodynamic stability  Description: INTERVENTIONS:.  -Monitor blood pressure and heart rate  -Monitor urine output and notify licensed practitioner for values outside of   -Assess for signes of decreased cardiac output  -Administer fluid and /or volume expanders as ordered    2/12/2025 1042 by Jolene Kirk RN  Outcome: Completed  2/12/2025 0457 by Elma Daniels RN  Outcome: Progressing  Goal: Achieves optimal ventilation and oxygenation  Description: INTERVENTIONS:.  -Assess for changes in respiratory status   -Assess for changes in mentation and behavior  -Position to facilitate oxygenation and minimize respiratory effort  -Oxygen supplementation based on oxygen saturation or arterial blood gases  -Initiate smoking cessation protocol as 
  Problem: Risk for Deficient Fluid Volume  Goal: Hemodynamic stability and optimal renal function maintained  Description: Interventions:.  -Monitor labs and assess for signs and symptoms of volume excess or deficit    -Monitor intake, output and patient weight  -Monitor response to interventions for patient's volume status, including labs, urine output, blood pressure (other measures as available)  -Fluid restriction as ordered  -Instruct patient on fluid and nutrition restrictions as appropriate      Outcome: Progressing     Problem: Risk for Decreased Cardiac Output  Goal: Maintains optimal cardiac output and hemodynamic stability  Description: INTERVENTIONS:.  -Monitor blood pressure and heart rate  -Monitor urine output and notify licensed practitioner for values outside of   -Assess for signes of decreased cardiac output  -Administer fluid and /or volume expanders as ordered    Outcome: Progressing  Goal: Achieves optimal ventilation and oxygenation  Description: INTERVENTIONS:.  -Assess for changes in respiratory status   -Assess for changes in mentation and behavior  -Position to facilitate oxygenation and minimize respiratory effort  -Oxygen supplementation based on oxygen saturation or arterial blood gases  -Initiate smoking cessation protocol as indicated  -Encourage broncho-pulmonary hygiene including cough, deep breathe, incentive spirometry  -Assess the need for suctioning and aspirate as needed  -Assess and instruct to report shortness of breath or any respiratory difficulty  -Respiratory therapy support as indicated      Outcome: Progressing     Problem: Risk for Maternal Injury  Goal: Remains free from seizures and injury related to seizure activity  Description: INTERVENTIONS:.  -Maintain airway, patient safety and administer oxygen as ordered  -Monitor patient for seizure activity, document and report duration and descrition  -If Seizure occurs, turn patient to dide and suction secretions as 
maintained  Description: Interventions:.  -Monitor labs and assess for signs and symptoms of volume excess or deficit    -Monitor intake, output and patient weight  -Monitor response to interventions for patient's volume status, including labs, urine output, blood pressure (other measures as available)  -Fluid restriction as ordered  -Instruct patient on fluid and nutrition restrictions as appropriate      Outcome: Progressing

## 2025-02-12 NOTE — LACTATION NOTE
Pt reports breastfeeding is going well and has improved. Baby has good output of urine and stool. Mother denies nipple damage, reports some tenderness. Pt states she can tell when the latch is shallow versus deep. Encouraged her to call out with questions or needs. Pt denies any at this time.

## 2025-02-12 NOTE — DISCHARGE INSTRUCTIONS
Please call 911 or go to the nearest Emergency department in the event of:  Pulmonary embolism-a blood clot in your lungs which could cause  Shortness of breath at rest  Chest pain that worsens when coughing  Change in level of consciousness  Cardiac Disease which could cause  Shortness of breath or difficulty breathing  Heart palpitations  Chest pain or pressure  Hypertension which could cause  Severe constant headache that does not respond to over-the-counter medications, rest or hydration  Pre-eclampsia which could cause  Severe constant headache that does not respond to over-the-counter medications, rest or hydration  Pain in upper right abdomen  Changes in vision, seeing spots, flashes of light  Swelling in face/hands/legs more than you would expect  Changes in level of consciousness  Eclampsia which could cause  Seizures  Obstetric hemorrhage which could cause  Bleeding through more than 1 pad/hour  Passing clots larger than a golf ball  Venous thrombosis-a blood clot usually in the leg (calf) which could cause  Leg pain, tenderness to the touch, burning, redness-particularly in the calf area  Swelling in one leg more than the other  Infection which could cause  Temperature more than 100.4F  Bad smelling blood or discharge from the vagina  Increase in redness or discharge from  site  Postpartum Depression which could cause  Feeling out of control, unable to care for yourself or the baby  Feeling sad or depressed most of the day   Having trouble sleeping or sleeping to much  Having trouble bonding with your baby

## 2025-02-12 NOTE — CARE COORDINATION
Discharge Report    [facility]  Clinical Case Management Department  Written by: Maricarmen Wynn RN    Patient Name: Celia Dinero  Attending Provider: Sameer Vences DO  Admit Date: 2/10/2025  1:15 PM  MRN: [id number]  Account: [id number]                     : 2000  Discharge Date: 2025      Disposition: home    Maricarmen Wynn RN

## 2025-02-12 NOTE — PROGRESS NOTES
POST PARTUM DAY # 10    Celia Dinero is a 24 y.o. female  This patient was seen & examined today.  on 25 admitted for post partum preeclampsia with Severe features for resistant headache     Her pregnancy was complicated by:   Patient Active Problem List   Diagnosis    Anxiety    Plaque psoriasis    PTSD (post-traumatic stress disorder)    Chronic major depressive disorder, recurrent episode (HCC)    Attention deficit hyperactivity disorder (ADHD), combined type    Weight loss    Arrington's neuroma of right foot     25 F Apg 8/9 Wt 6#13    Normal labor and delivery    Preeclampsia in postpartum period    Nonintractable headache    Postpartum state    Severe pre-eclampsia, antepartum    Hypertension in pregnancy, preeclampsia, severe, delivered/postpartum       Today she is doing well without any chief complaint. Her lochia is light. She denies chest pain, shortness of breath, headache, lightheadedness and blurred vision. She is ambulating well. Her voiding pattern is normal. I reviewed signs and symptoms of post partum depression with the patient, she currently denies any of these symptoms. She is tolerating solids.     Her headache is improving. She will follow up     Vital Signs:  Vitals:    25 2037 25 0030 25 0355 25 0857   BP: 123/86 131/86 129/79 123/73   Pulse: 82 86 77 69   Resp: 16 16 16 16   Temp: 97.9 °F (36.6 °C) 98.2 °F (36.8 °C) 98.1 °F (36.7 °C) 98.2 °F (36.8 °C)   TempSrc: Oral Oral Oral Oral   SpO2: 99% 98% 99% 99%         Physical Exam:  General:  no apparent distress, alert and cooperative  Neurologic:  alert, oriented, normal speech, no focal findings or movement disorder noted  Lungs:  No increased work of breathing, good air exchange, no conversational dyspnea  Heart:  Normal apical impulse, regular rate and rhythm  Abdomen: abdomen soft, non-distended, non-tender  Fundus: non-tender, firm, below umbilicus  Extremities:  no calf tenderness, non

## 2025-02-14 ENCOUNTER — TELEPHONE (OUTPATIENT)
Dept: FAMILY MEDICINE CLINIC | Age: 25
End: 2025-02-14

## 2025-02-14 NOTE — TELEPHONE ENCOUNTER
Care Transitions Initial Follow Up Call    Outreach made within 2 business days of discharge: Yes    Patient: Celia Dineor Patient : 2000   MRN: [id number]  Reason for Admission: Preeclampsia in postpartum period   Discharge Date: 25       Spoke with: CELIA    Discharge department/facility: [facility] Interactive Patient Contact:  Was patient able to fill all prescriptions: Yes  Was patient instructed to bring all medications to the follow-up visit: Yes  Is patient taking all medications as directed in the discharge summary? Yes  Does patient understand their discharge instructions: Yes  Does patient have questions or concerns that need addressed prior to 7-14 day follow up office visit: yes -     Additional needs identified to be addressed with provider               Scheduled appointment with PCP within 7-14 days    Follow Up  Future Appointments   Date Time Provider Department Center   2025 12:15 PM Reina Sheriff APRN - CNP [facility]   3/6/2025  9:15 AM [facility]       Yazmin Felix MA

## 2025-02-24 ENCOUNTER — OFFICE VISIT (OUTPATIENT)
Dept: OBGYN CLINIC | Age: 25
End: 2025-02-24

## 2025-02-24 VITALS
SYSTOLIC BLOOD PRESSURE: 118 MMHG | HEIGHT: 64 IN | WEIGHT: 158 LBS | RESPIRATION RATE: 18 BRPM | BODY MASS INDEX: 26.98 KG/M2 | DIASTOLIC BLOOD PRESSURE: 76 MMHG

## 2025-02-24 PROCEDURE — 0503F POSTPARTUM CARE VISIT: CPT | Performed by: NURSE PRACTITIONER

## 2025-02-24 RX ORDER — ESCITALOPRAM OXALATE 10 MG/1
10 TABLET ORAL DAILY
Qty: 30 TABLET | Refills: 1 | Status: SHIPPED | OUTPATIENT
Start: 2025-02-24 | End: 2025-03-26

## 2025-02-24 NOTE — PROGRESS NOTES
Celia Dinero  2025  3:18 PM        Celia Dinero is a 24 y.o. female       The patient was seen. She has no chief complaints today. She delivered vaginally on 2025. She is  breast feeding and there is not any signs or symptoms of mastitis.  The patient completed the E.P.D.S. Evaluation form and scored 15.  She does have any signs or symptoms of post partum depression. She denies any suicidal thoughts with a plan, intent to harm others, and delusional ideas. Patient currently on lexapro 10mg daily. Denies wanting an increase to dosage. Reports feels a little better than 2 weeks ago. Desires to continue on lexapro. Denies suicidal ideation or harmful thoughts to herself or baby. Continue on procardia XL 30mg daily.   Today her lochia is light she denies any dizziness or shortness of breath.      Her pregnancy was complicated by:  Patient Active Problem List    Diagnosis Date Noted    Attention deficit hyperactivity disorder (ADHD), combined type 2022     Priority: Medium    Chronic major depressive disorder, recurrent episode 2022     Priority: Medium    PTSD (post-traumatic stress disorder) 2022     Priority: Medium    Nonintractable headache 2025    Postpartum state 2025    Severe pre-eclampsia, antepartum 2025    Hypertension in pregnancy, preeclampsia, severe, delivered/postpartum 2025    Preeclampsia in postpartum period 02/10/2025     25 F Apg 8/9 Wt 6#13 2025    Normal labor and delivery 2025    Weight loss 2024    Arrington's neuroma of right foot 2024    Anxiety 2022    Plaque psoriasis 2022         She does admit to having good home support.      OB History    Para Term  AB Living   2 1 1 0 1 1   SAB IAB Ectopic Molar Multiple Live Births   0 0 0 0 0 1      # Outcome Date GA Lbr Fabio/2nd Weight Sex Type Anes PTL Lv   2 Term 25 39w5d / 01:00 3.1 kg (6 lb 13.4 oz) F Vag-Spont EPI N HECTOR

## 2025-04-23 ENCOUNTER — OFFICE VISIT (OUTPATIENT)
Dept: FAMILY MEDICINE CLINIC | Age: 25
End: 2025-04-23
Payer: COMMERCIAL

## 2025-04-23 VITALS
HEIGHT: 64 IN | HEART RATE: 91 BPM | TEMPERATURE: 98.2 F | SYSTOLIC BLOOD PRESSURE: 130 MMHG | BODY MASS INDEX: 27.31 KG/M2 | OXYGEN SATURATION: 97 % | WEIGHT: 160 LBS | DIASTOLIC BLOOD PRESSURE: 84 MMHG

## 2025-04-23 DIAGNOSIS — Z76.89 RETURN TO WORK EVALUATION: ICD-10-CM

## 2025-04-23 DIAGNOSIS — F33.9 CHRONIC MAJOR DEPRESSIVE DISORDER, RECURRENT EPISODE: Primary | ICD-10-CM

## 2025-04-23 PROBLEM — R63.4 WEIGHT LOSS: Status: RESOLVED | Noted: 2024-06-06 | Resolved: 2025-04-23

## 2025-04-23 PROBLEM — O14.10 SEVERE PRE-ECLAMPSIA, ANTEPARTUM: Status: RESOLVED | Noted: 2025-02-11 | Resolved: 2025-04-23

## 2025-04-23 PROCEDURE — 99214 OFFICE O/P EST MOD 30 MIN: CPT | Performed by: FAMILY MEDICINE

## 2025-04-23 RX ORDER — ESCITALOPRAM OXALATE 10 MG/1
10 TABLET ORAL DAILY
Qty: 30 TABLET | Refills: 1 | Status: SHIPPED | OUTPATIENT
Start: 2025-04-23 | End: 2025-05-23

## 2025-04-23 SDOH — ECONOMIC STABILITY: FOOD INSECURITY: WITHIN THE PAST 12 MONTHS, THE FOOD YOU BOUGHT JUST DIDN'T LAST AND YOU DIDN'T HAVE MONEY TO GET MORE.: NEVER TRUE

## 2025-04-23 SDOH — ECONOMIC STABILITY: FOOD INSECURITY: WITHIN THE PAST 12 MONTHS, YOU WORRIED THAT YOUR FOOD WOULD RUN OUT BEFORE YOU GOT MONEY TO BUY MORE.: NEVER TRUE

## 2025-04-23 ASSESSMENT — PATIENT HEALTH QUESTIONNAIRE - PHQ9
3. TROUBLE FALLING OR STAYING ASLEEP: NOT AT ALL
4. FEELING TIRED OR HAVING LITTLE ENERGY: NOT AT ALL
6. FEELING BAD ABOUT YOURSELF - OR THAT YOU ARE A FAILURE OR HAVE LET YOURSELF OR YOUR FAMILY DOWN: NOT AT ALL
9. THOUGHTS THAT YOU WOULD BE BETTER OFF DEAD, OR OF HURTING YOURSELF: NOT AT ALL
2. FEELING DOWN, DEPRESSED OR HOPELESS: NOT AT ALL
7. TROUBLE CONCENTRATING ON THINGS, SUCH AS READING THE NEWSPAPER OR WATCHING TELEVISION: NOT AT ALL
1. LITTLE INTEREST OR PLEASURE IN DOING THINGS: NOT AT ALL
8. MOVING OR SPEAKING SO SLOWLY THAT OTHER PEOPLE COULD HAVE NOTICED. OR THE OPPOSITE, BEING SO FIGETY OR RESTLESS THAT YOU HAVE BEEN MOVING AROUND A LOT MORE THAN USUAL: NOT AT ALL
10. IF YOU CHECKED OFF ANY PROBLEMS, HOW DIFFICULT HAVE THESE PROBLEMS MADE IT FOR YOU TO DO YOUR WORK, TAKE CARE OF THINGS AT HOME, OR GET ALONG WITH OTHER PEOPLE: NOT DIFFICULT AT ALL
SUM OF ALL RESPONSES TO PHQ QUESTIONS 1-9: 0
5. POOR APPETITE OR OVEREATING: NOT AT ALL
SUM OF ALL RESPONSES TO PHQ QUESTIONS 1-9: 0

## 2025-04-23 ASSESSMENT — ENCOUNTER SYMPTOMS
DIARRHEA: 0
RECTAL PAIN: 0
COLOR CHANGE: 0
STRIDOR: 0
BACK PAIN: 0
RHINORRHEA: 0
WHEEZING: 0
CHEST TIGHTNESS: 0
BLOOD IN STOOL: 0
EYE REDNESS: 0
NAUSEA: 0
SINUS PRESSURE: 0
SHORTNESS OF BREATH: 0
COUGH: 0
TROUBLE SWALLOWING: 0
SORE THROAT: 0
ABDOMINAL DISTENTION: 0
CONSTIPATION: 0
ABDOMINAL PAIN: 0
VOMITING: 0

## 2025-04-23 NOTE — PROGRESS NOTES
Chief Complaint   Patient presents with    Forms     Return to work Fmla      The patient (or guardian, if applicable) and other individuals in attendance with the patient were advised that Artificial Intelligence will be utilized during this visit to record, process the conversation to generate a clinical note, and support improvement of the AI technology. The patient (or guardian, if applicable) and other individuals in attendance at the appointment consented to the use of AI, including the recording.                    Forms (Return to work Fmla )      History of Present Illness  The patient presents for evaluation of postpartum depression, blood pressure management, and FMLA paperwork.    She is seeking to have her FMLA forms signed and returned to her employer 3 days prior to her scheduled return to work on 05/11/2025. Maternity leave has been taken for the past 14 weeks.    Postpartum depression was experienced following the birth of her child, necessitating frequent hospital visits. Improvement in her condition is noted, with a decrease in depressive symptoms. Lexapro was previously used but discontinued approximately 2 weeks ago. Initial suicidal ideation was discussed with her OB, leading to the initiation of Lexapro. No current suicidal ideation is endorsed. A history of PTSD and chronic depression is present. Counseling services were previously engaged but found unhelpful, and she is currently seeking a new counselor.    Her pregnancy was complicated by eclampsia, resulting in elevated blood pressure. No seizure activity is reported. A magnesium drip was received for 48 hours, followed by discharge. Blood pressure remains slightly elevated, but no antihypertensive medication is currently taken. Nifedipine was prescribed by her OB post-pregnancy but has since been discontinued.    Complications with a failed epidural were experienced, requiring multiple blood patches. A slight twist in her lumbar spine,

## 2025-04-23 NOTE — PROGRESS NOTES
Visit Information    Have you changed or started any medications since your last visit including any over-the-counter medicines, vitamins, or herbal medicines? no   Are you having any side effects from any of your medications? -  no  Have you stopped taking any of your medications? Is so, why? -  no    Have you seen any other physician or provider since your last visit? Yes - Records Obtained  Have you had any other diagnostic tests since your last visit? Yes - Records Obtained  Have you been seen in the emergency room and/or had an admission to a hospital since we last saw you? Yes - Records Obtained  Have you had your routine dental cleaning in the past 6 months?     Have you activated your [employer] account? If not, what are your barriers? Yes     Patient Care Team:  Agnes Willams MD as PCP - General (Family Medicine)  Agnes Willams MD as PCP - Empaneled Provider    Medical History Review  Past Medical, Family, and Social History reviewed and does contribute to the patient presenting condition    Health Maintenance   Topic Date Due    HPV vaccine (1 - 3-dose series) Never done    Hepatitis B vaccine (1 of 3 - 19+ 3-dose series) Never done    COVID-19 Vaccine (3 - 2024-25 season) 09/01/2024    Flu vaccine (Season Ended) 08/01/2025    Depression Monitoring  01/07/2026    Pap smear  07/22/2027    DTaP/Tdap/Td vaccine (2 - Td or Tdap) 05/09/2032    Meningococcal (ACWY) vaccine  Completed    Hepatitis C screen  Completed    HIV screen  Completed    Hepatitis A vaccine  Aged Out    Hib vaccine  Aged Out    Polio vaccine  Aged Out    Meningococcal B vaccine  Aged Out    Pneumococcal 0-49 years Vaccine  Aged Out    Varicella vaccine  Discontinued    Depression Screen  Discontinued    Chlamydia/GC screen  Discontinued

## 2025-08-04 ENCOUNTER — ENROLLMENT (OUTPATIENT)
Age: 25
End: 2025-08-04

## 2025-08-05 ENCOUNTER — TELEPHONE (OUTPATIENT)
Age: 25
End: 2025-08-05

## 2025-08-05 ENCOUNTER — TELEPHONE (OUTPATIENT)
Facility: HOSPITAL | Age: 25
End: 2025-08-05

## 2025-08-05 RX ORDER — GUSELKUMAB 100 MG/ML
100 INJECTION SUBCUTANEOUS
COMMUNITY

## 2025-08-05 RX ORDER — CLOBETASOL PROPIONATE 0.5 MG/ML
SOLUTION TOPICAL 2 TIMES DAILY PRN
COMMUNITY
Start: 2025-08-01

## 2025-08-05 ASSESSMENT — PROMIS GLOBAL HEALTH SCALE
WHO IS THE PERSON COMPLETING THE PROMIS V1.1 SURVEY?: SELF
IN GENERAL, HOW WOULD YOU RATE YOUR SATISFACTION WITH YOUR SOCIAL ACTIVITIES AND RELATIONSHIPS [ON A SCALE OF 1 (POOR) TO 5 (EXCELLENT)]?: GOOD
TO WHAT EXTENT ARE YOU ABLE TO CARRY OUT YOUR EVERYDAY PHYSICAL ACTIVITIES SUCH AS WALKING, CLIMBING STAIRS, CARRYING GROCERIES, OR MOVING A CHAIR [ON A SCALE OF 1 (NOT AT ALL) TO 5 (COMPLETELY)]?: MODERATELY
SUM OF RESPONSES TO QUESTIONS 3, 6, 7, & 8: 12
IN GENERAL, WOULD YOU SAY YOUR QUALITY OF LIFE IS...[ON A SCALE OF 1 (POOR) TO 5 (EXCELLENT)]: GOOD
IN THE PAST 7 DAYS, HOW OFTEN HAVE YOU BEEN BOTHERED BY EMOTIONAL PROBLEMS, SUCH AS FEELING ANXIOUS, DEPRESSED, OR IRRITABLE [ON A SCALE FROM 1 (NEVER) TO 5 (ALWAYS)]?: OFTEN
IN THE PAST 7 DAYS, HOW WOULD YOU RATE YOUR PAIN ON AVERAGE [ON A SCALE FROM 0 (NO PAIN) TO 10 (WORST IMAGINABLE PAIN)]?: 3
IN GENERAL, HOW WOULD YOU RATE YOUR PHYSICAL HEALTH [ON A SCALE OF 1 (POOR) TO 5 (EXCELLENT)]?: GOOD
IN THE PAST 7 DAYS, HOW OFTEN HAVE YOU BEEN BOTHERED BY EMOTIONAL PROBLEMS, SUCH AS FEELING ANXIOUS, DEPRESSED, OR IRRITABLE [ON A SCALE FROM 1 (NEVER) TO 5 (ALWAYS)]?: OFTEN
IN THE PAST 7 DAYS, HOW WOULD YOU RATE YOUR PAIN ON AVERAGE [ON A SCALE FROM 0 (NO PAIN) TO 10 (WORST IMAGINABLE PAIN)]?: 3
IN GENERAL, WOULD YOU SAY YOUR HEALTH IS...[ON A SCALE OF 1 (POOR) TO 5 (EXCELLENT)]: GOOD
HOW IS THE PROMIS V1.1 BEING ADMINISTERED?: ELECTRONIC
IN THE PAST 7 DAYS, HOW WOULD YOU RATE YOUR FATIGUE ON AVERAGE [ON A SCALE FROM 1 (NONE) TO 5 (VERY SEVERE)]?: MODERATE
HOW IS THE PROMIS V1.1 BEING ADMINISTERED?: ELECTRONIC
IN GENERAL, HOW WOULD YOU RATE YOUR MENTAL HEALTH, INCLUDING YOUR MOOD AND YOUR ABILITY TO THINK [ON A SCALE OF 1 (POOR) TO 5 (EXCELLENT)]?: GOOD
IN GENERAL, HOW WOULD YOU RATE YOUR SATISFACTION WITH YOUR SOCIAL ACTIVITIES AND RELATIONSHIPS [ON A SCALE OF 1 (POOR) TO 5 (EXCELLENT)]?: GOOD
TO WHAT EXTENT ARE YOU ABLE TO CARRY OUT YOUR EVERYDAY PHYSICAL ACTIVITIES SUCH AS WALKING, CLIMBING STAIRS, CARRYING GROCERIES, OR MOVING A CHAIR [ON A SCALE OF 1 (NOT AT ALL) TO 5 (COMPLETELY)]?: MODERATELY
IN THE PAST 7 DAYS, HOW WOULD YOU RATE YOUR FATIGUE ON AVERAGE [ON A SCALE FROM 1 (NONE) TO 5 (VERY SEVERE)]?: MODERATE
IN GENERAL, PLEASE RATE HOW WELL YOU CARRY OUT YOUR USUAL SOCIAL ACTIVITIES (INCLUDES ACTIVITIES AT HOME, AT WORK, AND IN YOUR COMMUNITY, AND RESPONSIBILITIES AS A PARENT, CHILD, SPOUSE, EMPLOYEE, FRIEND, ETC) [ON A SCALE OF 1 (POOR) TO 5 (EXCELLENT)]?: GOOD
WHO IS THE PERSON COMPLETING THE PROMIS V1.1 SURVEY?: SELF
SUM OF RESPONSES TO QUESTIONS 2, 4, 5, & 10: 11
IN GENERAL, PLEASE RATE HOW WELL YOU CARRY OUT YOUR USUAL SOCIAL ACTIVITIES (INCLUDES ACTIVITIES AT HOME, AT WORK, AND IN YOUR COMMUNITY, AND RESPONSIBILITIES AS A PARENT, CHILD, SPOUSE, EMPLOYEE, FRIEND, ETC) [ON A SCALE OF 1 (POOR) TO 5 (EXCELLENT)]?: GOOD

## 2025-08-06 ENCOUNTER — PHARMACY VISIT (OUTPATIENT)
Age: 25
End: 2025-08-06

## 2025-08-06 DIAGNOSIS — L40.50 PSORIATIC ARTHRITIS (HCC): Primary | ICD-10-CM

## 2025-08-06 RX ORDER — ACETAMINOPHEN 500 MG
1000 TABLET ORAL EVERY 6 HOURS PRN
COMMUNITY

## 2025-08-06 RX ORDER — IBUPROFEN 200 MG
200-800 TABLET ORAL EVERY 6 HOURS PRN
COMMUNITY

## 2025-08-06 ASSESSMENT — ROUTINE ASSESSMENT OF PATIENT INDEX DATA (RAPID3)
TOTAL RAPID3 SCORE: 14
ON A SCALE OF ONE TO TEN, HOW MUCH PAIN HAVE YOU HAD BECAUSE OF YOUR CONDITION OVER THE PAST WEEK?: 6
ON A SCALE OF ONE TO TEN, CONSIDERING ALL THE WAYS IN WHICH ILLNESS AND HEALTH CONDITIONS MAY AFFECT YOU AT THIS TIME, PLEASE INDICATE BELOW HOW YOU ARE DOING:: 5
TOTAL RAPID3 SCORE: 11
ON A SCALE OF ONE TO TEN, HOW DIFFICULT WAS IT FOR YOU TO COMPLETE THE LISTED DAILY PHYSICAL TASKS OVER THE LAST WEEK: 4.67

## 2025-08-12 ENCOUNTER — PHARMACY VISIT (OUTPATIENT)
Age: 25
End: 2025-08-12

## 2025-08-12 DIAGNOSIS — L40.50 PSORIATIC ARTHRITIS (HCC): Primary | ICD-10-CM

## 2025-08-13 RX ORDER — GUSELKUMAB 100 MG/ML
100 INJECTION SUBCUTANEOUS
Qty: 1 ML | Refills: 3 | Status: SHIPPED | OUTPATIENT
Start: 2025-08-13

## 2025-08-13 RX ORDER — GUSELKUMAB 100 MG/ML
100 INJECTION SUBCUTANEOUS ONCE
Qty: 1 ML | Refills: 0 | Status: SHIPPED | OUTPATIENT
Start: 2025-08-13 | End: 2025-08-13